# Patient Record
Sex: FEMALE | Race: WHITE | Employment: OTHER | URBAN - METROPOLITAN AREA
[De-identification: names, ages, dates, MRNs, and addresses within clinical notes are randomized per-mention and may not be internally consistent; named-entity substitution may affect disease eponyms.]

---

## 2017-03-06 RX ORDER — BUPROPION HYDROCHLORIDE 300 MG/1
TABLET ORAL
Qty: 30 TAB | Refills: 2 | Status: SHIPPED | OUTPATIENT
Start: 2017-03-06 | End: 2017-06-20 | Stop reason: SDUPTHER

## 2017-03-23 ENCOUNTER — TELEPHONE (OUTPATIENT)
Dept: INTERNAL MEDICINE CLINIC | Age: 67
End: 2017-03-23

## 2017-03-23 DIAGNOSIS — Z85.820 HISTORY OF MELANOMA: Primary | ICD-10-CM

## 2017-03-23 DIAGNOSIS — M54.2 NECK PAIN: ICD-10-CM

## 2017-03-24 NOTE — TELEPHONE ENCOUNTER
Returned call to patient. She is requesting referrals for dermatology for changing mole. Has increased in size above and below the surface of the skin. Has family history of of melanoma. 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 Related to cervical pain per patient. Would like a trigger point injection.

## 2017-04-26 ENCOUNTER — OFFICE VISIT (OUTPATIENT)
Dept: DERMATOLOGY | Facility: AMBULATORY SURGERY CENTER | Age: 67
End: 2017-04-26

## 2017-04-26 VITALS
SYSTOLIC BLOOD PRESSURE: 156 MMHG | HEART RATE: 79 BPM | DIASTOLIC BLOOD PRESSURE: 78 MMHG | HEIGHT: 60 IN | WEIGHT: 132 LBS | TEMPERATURE: 98.1 F | BODY MASS INDEX: 25.91 KG/M2 | OXYGEN SATURATION: 98 % | RESPIRATION RATE: 16 BRPM

## 2017-04-26 DIAGNOSIS — D48.5 NEOPLASM OF UNCERTAIN BEHAVIOR OF SKIN OF EAR: Primary | ICD-10-CM

## 2017-04-26 DIAGNOSIS — D48.5 NEOPLASM OF UNCERTAIN BEHAVIOR OF SKIN: ICD-10-CM

## 2017-04-26 DIAGNOSIS — D22.9 MULTIPLE BENIGN NEVI: ICD-10-CM

## 2017-04-26 DIAGNOSIS — L80 VITILIGO: ICD-10-CM

## 2017-04-26 DIAGNOSIS — Z80.8 FAMILY HISTORY OF MELANOMA: ICD-10-CM

## 2017-04-26 DIAGNOSIS — D18.01 CHERRY ANGIOMA: ICD-10-CM

## 2017-04-26 DIAGNOSIS — L82.1 SEBORRHEIC KERATOSES: ICD-10-CM

## 2017-04-26 NOTE — PROGRESS NOTES
Name: Kwesi Gillis       Age: 61 y.o. Date: 2017    Chief Complaint:   Chief Complaint   Patient presents with    Skin Exam       Subjective:    HPI  Ms. Kwesi Gillis is a 61 y.o. female who presents as a new patient to [place] [address] for a skin exam.  The patient was referred for this evaluation by Dr. Ernie Flores. The patient has had a skin exam in the past and the patient does have current complaints related to her skin. The patient reports a changing lesion on the right frontal scalp. She states this is been present for at least 2 years and had started as a tan flat freckle. She states over the last 2 years this is thickened and become more wrinkled. She states she had sent with the comb due to the raised nature. She also reports a lesion in the right ear that occasionally gets scratched and can bleed. She states this is of long duration. She is recently relocated to [facility] from [place] about 1.5 years ago. She is a retired nurse of critical care. Ms. Kwesi Gillis is feeling well and in her usual state of health today. The patient's pertinent skin history includes: No personal history of skin cancer but does have a history of having lesions removed she reports these were benign. She reports a family history of melanoma in her mother who is  from this. ROS: Constitutional: Negative. Dermatological : positive for - skin lesion changes      Social History     Social History    Marital status:      Spouse name: N/A    Number of children: N/A    Years of education: N/A     Occupational History    Not on file.      Social History Main Topics    Smoking status: Former Smoker     Quit date: 2010    Smokeless tobacco: Never Used    Alcohol use 8.4 oz/week     14 Shots of liquor per week    Drug use: No    Sexual activity: Yes     Partners: Male     Birth control/ protection: None     Other Topics Concern    Not on file Social History Narrative       Family History   Problem Relation Age of Onset    Cancer Mother        Past Medical History:   Diagnosis Date    Anemia     Anxiety     Breast cancer ([address] 2005    right lumpectomy    Chronic pain     neck pain    Cough     Depression     Hemorrhoid     Menopause 2005    Muscle pain     Neck problem     Pneumonia     sepis    S/P radiation therapy 2005    Sleep trouble     Thyroid disease     Urine troubles     unable to controll       Past Surgical History:   Procedure Laterality Date    HX BREAST LUMPECTOMY Right 2005    HX  SECTION      HX THYROIDECTOMY      IMPLANT BREAST SILICONE/EQ Bilateral 3335       Current Outpatient Prescriptions   Medication Sig Dispense Refill    LORazepam (ATIVAN) 1 mg tablet TAKE 1 TABLET BY MOUTH TWICE A DAY AS NEEDED FOR ANXIETY AND INSOMNIA 60 Tab 0    buPROPion XL (WELLBUTRIN XL) 300 mg XL tablet TAKE 1 TABLET BY MOUTH EVERY MORNING. 30 Tab 2    escitalopram oxalate (LEXAPRO) 20 mg tablet TAKE 1 TAB BY MOUTH DAILY. 90 Tab 3    levothyroxine (SYNTHROID) 112 mcg tablet Take 1 Tab by mouth daily. 90 Tab 3    melatonin tab tablet Take 5 mg by mouth nightly. No Known Allergies      Objective:    Visit Vitals    /78 (BP 1 Location: Left arm, BP Patient Position: Sitting)    Pulse 79    Temp 98.1 °F (36.7 °C) (Oral)    Resp 16    Ht 5' (1.524 m)    Wt 59.9 kg (132 lb)    SpO2 98%    BMI 25.78 kg/m2       Davey Pham is a 61 y.o. female who appears well and in no distress. She is awake, alert, and oriented. There is no preauricular, submandibular, or cervical lymphadenopathy. A skin examination was performed including her scalp, face (including eyelid), ears, neck, chest, back, abdomen, upper extremities (including digits/nails), lower extremities, breasts, buttocks; genital skin was not examined. She has lentigines on sun exposed areas.   There waxy macules and keratotic papules consistent with seborrheic keratoses. The lesion on the right frontal scalp appears to be a tan seborrheic keratosis that is raised and inflamed. This is 11 x 9 mm in size. There are scattered cherry angiomas. She has medium brown junctional nevi and a few intradermal nevi without concerning features for severe atypia. There is an intradermal nevus on the right anti-helix that is 5 x 5 mm in size. This is a lesion of concern in her ear. There is a traumatic scar on the left forehead. She has patches of hypopigmentation consistent with vitiligo. Assessment/Plan:  1. Normal nevi. The diagnosis of normal nevi was reviewed. I discussed sun protection, sunscreen use, the warning signs of skin cancer, the need for self-skin examinations, and the need for regular practitioner exams every 1 year. The patient should follow up sooner as needed if new, changing, or symptomatic skin lesions arise. 2. Neoplasm of Uncertain Behavior, right frontal scalp, favor SK. The differential diagnoses were discussed. A shave removal was advised to address this lesion. The procedure was reviewed and verbal and written consent were obtained. The risks of pain, bleeding, infection, recurrence and scar were discussed. I performed the procedure. The site was cleansed and anesthetized with 1% Lidocaine with Epinephrine 1:100,000. A shave removal was performed to remove the lesion in its clinical entirety. Drysol was used for hemostasis. The wound was bandaged and care reviewed. The specimen was sent to pathology. I will contact the patient with the results and any further treatment that may be necessary. 3. Neoplasm of Uncertain Behavior, right ear, favor benign nevus. The differential diagnoses were discussed. A shave removal was advised to address this lesion. The procedure was reviewed and verbal and written consent were obtained. The risks of pain, bleeding, infection, recurrence and scar were discussed.   I performed the procedure. The site was cleansed and anesthetized with 1% Lidocaine with Epinephrine 1:100,000. A shave removal was performed to remove the lesion in its clinical entirety. Drysol was used for hemostasis. The wound was bandaged and care reviewed. The specimen was sent to pathology. I will contact the patient with the results and any further treatment that may be necessary. 4. Family history of skin cancer. I discussed sun protection, sunscreen use, the warning signs of skin cancer, the need for self-skin examinations, and the need for regular practitioner exams every 1 year. The patient should follow up sooner as needed if new, changing, or symptomatic skin lesions arise. 5. Seborrheic keratoses. The diagnosis was reviewed and the patient was reassured that no treatment is needed for these benign lesions. 6. Cherry angiomas. The diagnosis was reviewed and the patient was reassured that no treatment is needed for these benign lesions. [facility]   OFFICE PROCEDURE PROGRESS NOTE   Chart reviewed for the following:   Jeremiah FLORENCE, have reviewed the History, Physical and updated the Allergic reactions for [employer]. TIME OUT performed immediately prior to start of procedure:   Lotus FLORENCE, have performed the following reviews on [employer]   prior to the start of the procedure:     * Patient was identified by name and date of birth   * Agreement on procedure being performed was verified   * Risks and Benefits explained to the patient   * Procedure site verified and marked as necessary   * Patient was positioned for comfort   * Consent was signed and verified     Time: 1150  Date of procedure: 4/26/2017  Procedure performed by: Nam Escobar.  Feng Moreno  Provider assisted by: lpn   Patient assisted by: self   How tolerated by patient: tolerated the procedure well with no complications   Comments: none

## 2017-04-26 NOTE — MR AVS SNAPSHOT
Visit Information Date & Time Provider Department Dept. Phone Encounter #  
 4/26/2017 11:30 AM JOSE ARMANDO Siddiqui 8057 [phone] [id number] Your Appointments 4/26/2017 11:30 AM  
Office Visit with JOSE ARMANDO Siddiqui [phone] [facility]) Appt Note: est pt; Skin Exam-Growth on scalp. [facility] [id number] [place] [id number] Upcoming Health Maintenance Date Due Hepatitis C Screening 1950 DTaP/Tdap/Td series (1 - Tdap) 9/24/1973 FOBT Q 1 YEAR AGE 50-75 9/24/2002 ZOSTER VACCINE AGE 60> 9/24/2012 INFLUENZA AGE 9 TO ADULT 8/1/2016 BREAST CANCER SCRN MAMMOGRAM 10/17/2018 PAP AKA CERVICAL CYTOLOGY 8/31/2019 Allergies as of 4/26/2017  Review Complete On: 4/26/2017 By: Breanna Avila LPN No Known Allergies Current Immunizations  Reviewed on 10/14/2015 Name Date Influenza Vaccine 10/7/2015 Not reviewed this visit Vitals BP Pulse Temp Resp Height(growth percentile) Weight(growth percentile) 156/78 (BP 1 Location: Left arm, BP Patient Position: Sitting) 79 98.1 °F (36.7 °C) (Oral) 16 5' (1.524 m) 132 lb (59.9 kg) SpO2 BMI OB Status Smoking Status 98% 25.78 kg/m2 Postmenopausal Former Smoker Vitals History BMI and BSA Data Body Mass Index Body Surface Area 25.78 kg/m 2 1.59 m 2 Preferred Pharmacy Pharmacy Name Phone [phone] Your Updated Medication List  
  
   
This list is accurate as of: 4/26/17 11:28 AM.  Always use your most recent med list.  
  
  
  
  
 buPROPion  mg XL tablet Commonly known as:  WELLBUTRIN XL  
TAKE 1 TABLET BY MOUTH EVERY MORNING. escitalopram oxalate 20 mg tablet Commonly known as:  Maudie Phoenix TAKE 1 TAB BY MOUTH DAILY. levothyroxine 112 mcg tablet Commonly known as:  SYNTHROID Take 1 Tab by mouth daily. LORazepam 1 mg tablet Commonly known as:  ATIVAN  
TAKE 1 TABLET BY MOUTH TWICE A DAY AS NEEDED FOR ANXIETY AND INSOMNIA  
  
 melatonin Tab tablet Take 5 mg by mouth nightly. Patient Instructions Self Skin Exam and Sunscreens Early detection and treatment is essential in the treatment of all forms of skin cancer. If caught early, all forms of skin cancer are curable. In addition to your regular visits, you should perform a monthly skin examination. Over time, you become familiar with what is normally found on your skin and can identify new or suspicious spots. One of the screening tools you can use to assess your skin is to follow the ABCDEs: 
 
A= Asymmetry (One half is unlike the other half) B= Border (An irregular, scalloped or poorly defined edge) C= Color (Is varied from one area to another, has shades of tan, brown/ black,       white, red or blue) D= Diameter (Spots larger than 6mm or a pencil eraser) E= Evolving (New spots or one that is changing in size, shape, or color) A follow- up interval will be customized based on your history of skin cancer or level of skin damage and risk factors. In any case, if you notice a suspicious or new spot, an appointment should be arranged between regular visits. Everyone should use sunscreen and sun-safe practices, which is especially important for those with a personal or family history of skin cancer. Suggestions for this include: 1. Use daily moisturizers containing SPF 30 or higher. 2. Wear long sleeve clothing with UPF ratings and a broad-brimmed hat. 3. Apply sunscreen with SPF 30 or higher to all sun exposed areas if you are going to be in the sun. A broad spectrum UVA/ UVB sunscreen is best.  Dont forget to REAPPLY every two hours or more often if swimming or sweating! 4. Avoid outside activities during peak sun hours, especially in the summer (10am- 2pm). 5. DO NOT use tanning beds. Using sunscreen and sun-safe practices can help reduce the likelihood of developing skin cancer or additional skin cancers in those previously diagnosed. Introducing [facility] & HEALTH SERVICES! Dear Keesha Constantino: 
Thank you for requesting a Aegis account. Our records indicate that you already have an active Aegis account. You can access your account anytime at [email or web address] Did you know that you can access your hospital and ER discharge instructions at any time in Aegis? You can also review all of your test results from your hospital stay or ER visit. Additional Information If you have questions, please visit the Frequently Asked Questions section of the Aegis website at [email or web address] Remember, Aegis is NOT to be used for urgent needs. For medical emergencies, dial 911. Now available from your iPhone and Android! Please provide this summary of care documentation to your next provider. Your primary care clinician is listed as Barrington Rossi. If you have any questions after today's visit, please call [phone].

## 2017-05-02 LAB
DX ICD CODE: NORMAL
DX ICD CODE: NORMAL
PATH REPORT.FINAL DX SPEC: NORMAL
PATH REPORT.GROSS SPEC: NORMAL
PATH REPORT.MICROSCOPIC SPEC OTHER STN: NORMAL
PATH REPORT.RELEVANT HX SPEC: NORMAL
PATH REPORT.SITE OF ORIGIN SPEC: NORMAL
PATHOLOGIST NAME: NORMAL
PAYMENT PROCEDURE: NORMAL

## 2017-05-02 NOTE — PROGRESS NOTES
Letter sent about benign path report and that no further tx is needed. I asked the patient to call with questions/ concerns.

## 2017-05-26 ENCOUNTER — OFFICE VISIT (OUTPATIENT)
Dept: INTERNAL MEDICINE CLINIC | Age: 67
End: 2017-05-26

## 2017-05-26 VITALS
OXYGEN SATURATION: 96 % | DIASTOLIC BLOOD PRESSURE: 77 MMHG | BODY MASS INDEX: 24.46 KG/M2 | TEMPERATURE: 98.2 F | RESPIRATION RATE: 16 BRPM | HEIGHT: 60 IN | WEIGHT: 124.6 LBS | SYSTOLIC BLOOD PRESSURE: 147 MMHG | HEART RATE: 82 BPM

## 2017-05-26 DIAGNOSIS — E03.9 ACQUIRED HYPOTHYROIDISM: ICD-10-CM

## 2017-05-26 DIAGNOSIS — F51.01 PRIMARY INSOMNIA: Primary | ICD-10-CM

## 2017-05-26 RX ORDER — TRAZODONE HYDROCHLORIDE 50 MG/1
50 TABLET ORAL
Qty: 30 TAB | Refills: 11 | Status: SHIPPED | OUTPATIENT
Start: 2017-05-26 | End: 2017-05-31 | Stop reason: SDUPTHER

## 2017-05-26 NOTE — PROGRESS NOTES
1. Have you been to the ER, urgent care clinic since your last visit? Hospitalized since your last visit?no    2. Have you seen or consulted any other health care providers outside of the [address] [place] [address] since your last visit? Include any pap smears or colon screening.  no

## 2017-05-26 NOTE — PATIENT INSTRUCTIONS

## 2017-05-26 NOTE — PROGRESS NOTES
SUBJECTIVE  Ms. Marlon Jones presents today acutely for     Chief Complaint   Patient presents with    Insomnia     pt states she only get 2 hours of sleep at night; pt has been getting headaches        Insomnia for the past 10 years. Dr. Hugo Cole has tried \"something which sometimes works and sometimes doesn't. \"   \"She didn't want to give me both a sleeping medicine and ativan, I had to choose so I chose ativan. But that's just not cutting it. \"  \"It's wearing me down, I can't take it anymore. \"     Current Outpatient Prescriptions on File Prior to Visit   Medication Sig Dispense Refill    LORazepam (ATIVAN) 1 mg tablet TAKE 1 TABLET TWICE A DAY AS NEEDED 60 Tab 0    buPROPion XL (WELLBUTRIN XL) 300 mg XL tablet TAKE 1 TABLET BY MOUTH EVERY MORNING. 30 Tab 2    levothyroxine (SYNTHROID) 112 mcg tablet Take 1 Tab by mouth daily. 90 Tab 3    melatonin tab tablet Take 5 mg by mouth nightly.  escitalopram oxalate (LEXAPRO) 20 mg tablet TAKE 1 TAB BY MOUTH DAILY. 90 Tab 3     No current facility-administered medications on file prior to visit. OBJECTIVE  Visit Vitals    /77 (BP 1 Location: Left arm, BP Patient Position: Sitting)    Pulse 82    Temp 98.2 °F (36.8 °C) (Oral)    Resp 16    Ht 5' (1.524 m)    Wt 124 lb 9.6 oz (56.5 kg)    SpO2 96%    BMI 24.33 kg/m2     Gen: Pleasant 61 y.o.  female in NAD.   HEENT: PERRLA. EOMI. OP moist and pink.  Neck: Supple.  No LAD.  HEART: RRR, No M/G/R.   LUNGS: CTAB No W/R.   ABDOMEN: S, NT, ND, BS+.   EXTREMITIES: Warm. No C/C/E. MUSCULOSKELETAL: Normal ROM, muscle strength 5/5 all groups. NEURO: Alert and oriented x 3.  Cranial nerves grossly intact.  No focal sensory or motor deficits noted. SKIN: Warm. Dry. No rashes or other lesions noted. ASSESSMENT / PLAN    ICD-10-CM ICD-9-CM    1.  Primary insomnia F51.01 307.42 traZODone (DESYREL) 50 mg tablet       I have reviewed with the patient details of the assessment and plan and all questions were answered. Relevant patient education was performed. Follow-up Disposition:  Return if symptoms worsen or fail to improve.

## 2017-05-26 NOTE — MR AVS SNAPSHOT
Visit Information Date & Time Provider Department Dept. Phone Encounter #  
 5/26/2017  2:45 PM Roxy Tsai, [address] [place] [address],[address] [phone] [id number] Follow-up Instructions Return in about 6 weeks (around 7/7/2017) for Insomnia; Dr. Mercy Longo. Upcoming Health Maintenance Date Due Hepatitis C Screening 1950 DTaP/Tdap/Td series (1 - Tdap) 9/24/1973 FOBT Q 1 YEAR AGE 50-75 9/24/2002 ZOSTER VACCINE AGE 60> 9/24/2012 INFLUENZA AGE 9 TO ADULT 8/1/2017 BREAST CANCER SCRN MAMMOGRAM 10/17/2018 PAP AKA CERVICAL CYTOLOGY 8/31/2019 Allergies as of 5/26/2017  Review Complete On: 5/26/2017 By: Roxy Tsai MD  
 No Known Allergies Current Immunizations  Reviewed on 10/14/2015 Name Date Influenza Vaccine 10/7/2015 Not reviewed this visit You Were Diagnosed With   
  
 Codes Comments Primary insomnia    -  Primary ICD-10-CM: F51.01 
ICD-9-CM: 307.42 Acquired hypothyroidism     ICD-10-CM: E03.9 ICD-9-CM: 644. 9 Vitals BP Pulse Temp Resp Height(growth percentile) Weight(growth percentile) 147/77 (BP 1 Location: Left arm, BP Patient Position: Sitting) 82 98.2 °F (36.8 °C) (Oral) 16 5' (1.524 m) 124 lb 9.6 oz (56.5 kg) SpO2 BMI OB Status Smoking Status 96% 24.33 kg/m2 Postmenopausal Former Smoker Vitals History BMI and BSA Data Body Mass Index Body Surface Area  
 24.33 kg/m 2 1.55 m 2 Preferred Pharmacy Pharmacy Name Phone [id number] [phone] Your Updated Medication List  
  
   
This list is accurate as of: 5/26/17  3:36 PM.  Always use your most recent med list.  
  
  
  
  
 buPROPion  mg XL tablet Commonly known as:  WELLBUTRIN XL  
TAKE 1 TABLET BY MOUTH EVERY MORNING. levothyroxine 112 mcg tablet Commonly known as:  SYNTHROID Take 1 Tab by mouth daily. LORazepam 1 mg tablet Commonly known as:  ATIVAN  
TAKE 1 TABLET TWICE A DAY AS NEEDED  
  
 melatonin Tab tablet Take 5 mg by mouth nightly. traZODone 50 mg tablet Commonly known as:  Arline Ricketts Take 1 Tab by mouth nightly. Prescriptions Sent to Pharmacy Refills  
 traZODone (DESYREL) 50 mg tablet 11 Sig: Take 1 Tab by mouth nightly. Class: Normal  
 [facility], [address] [place] [address] #: [phone] Route: Oral  
  
We Performed the Following METABOLIC PANEL, COMPREHENSIVE [72760 CPT(R)] T4, FREE [id number] CPT(R)] TSH 3RD GENERATION [77973 CPT(R)] Follow-up Instructions Return in about 6 weeks (around 7/7/2017) for Insomnia; Dr. Michael Wick. Patient Instructions Insomnia: Care Instructions Your Care Instructions Insomnia is the inability to sleep well. It is a common problem for most people at some time. Insomnia may make it hard for you to get to sleep, stay asleep, or sleep as long as you need to. This can make you tired and grouchy during the day. It can also make you forgetful, less effective at work, and unhappy. Insomnia can be caused by conditions such as depression or anxiety. Pain can also affect your ability to sleep. When these problems are solved, the insomnia usually clears up. But sometimes bad sleep habits can cause insomnia. If insomnia is affecting your work or your enjoyment of life, you can take steps to improve your sleep. Follow-up care is a key part of your treatment and safety. Be sure to make and go to all appointments, and call your doctor if you are having problems. It's also a good idea to know your test results and keep a list of the medicines you take. How can you care for yourself at home? What to avoid · Do not have drinks with caffeine, such as coffee or black tea, for 8 hours before bed. · Do not smoke or use other types of tobacco near bedtime.  Nicotine is a stimulant and can keep you awake. · Avoid drinking alcohol late in the evening, because it can cause you to wake in the middle of the night. · Do not eat a big meal close to bedtime. If you are hungry, eat a light snack. · Do not drink a lot of water close to bedtime, because the need to urinate may wake you up during the night. · Do not read or watch TV in bed. Use the bed only for sleeping and sexual activity. What to try · Go to bed at the same time every night, and wake up at the same time every morning. Do not take naps during the day. · Keep your bedroom quiet, dark, and cool. · Sleep on a comfortable pillow and mattress. · If watching the clock makes you anxious, turn it facing away from you so you cannot see the time. · If you worry when you lie down, start a worry book. Well before bedtime, write down your worries, and then set the book and your concerns aside. · Try meditation or other relaxation techniques before you go to bed. · If you cannot fall asleep, get up and go to another room until you feel sleepy. Do something relaxing. Repeat your bedtime routine before you go to bed again. · Make your house quiet and calm about an hour before bedtime. Turn down the lights, turn off the TV, log off the computer, and turn down the volume on music. This can help you relax after a busy day. When should you call for help? Watch closely for changes in your health, and be sure to contact your doctor if: 
· Your efforts to improve your sleep do not work. · Your insomnia gets worse. · You have been feeling down, depressed, or hopeless or have lost interest in things that you usually enjoy. Where can you learn more? Go to http://theresa-cathleen.info/. Enter P513 in the search box to learn more about \"Insomnia: Care Instructions. \" Current as of: July 26, 2016 Content Version: 11.2 © 2569-0553 [employer].  Care instructions adapted under license by [address] (which disclaims liability or warranty for this information). If you have questions about a medical condition or this instruction, always ask your healthcare professional. [address] any warranty or liability for your use of this information. Introducing [facility] & HEALTH SERVICES! Dear Keesha Constantino: 
Thank you for requesting a CampEasy account. Our records indicate that you already have an active CampEasy account. You can access your account anytime at [email or web address]. "[email or web address]/[email or web address] Studio Did you know that you can access your hospital and ER discharge instructions at any time in CampEasy? You can also review all of your test results from your hospital stay or ER visit. Additional Information If you have questions, please visit the Frequently Asked Questions section of the CampEasy website at [email or web address] Remember, CampEasy is NOT to be used for urgent needs. For medical emergencies, dial 911. Now available from your iPhone and Android! Please provide this summary of care documentation to your next provider. Your primary care clinician is listed as Barrington Rossi. If you have any questions after today's visit, please call [phone].

## 2017-05-27 LAB
ALBUMIN SERPL-MCNC: 4.2 G/DL (ref 3.6–4.8)
ALBUMIN/GLOB SERPL: 2.1 {RATIO} (ref 1.2–2.2)
ALP SERPL-CCNC: 41 IU/L (ref 39–117)
ALT SERPL-CCNC: 9 IU/L (ref 0–32)
AST SERPL-CCNC: 12 IU/L (ref 0–40)
BILIRUB SERPL-MCNC: 0.3 MG/DL (ref 0–1.2)
BUN SERPL-MCNC: 21 MG/DL (ref 8–27)
BUN/CREAT SERPL: 25 (ref 12–28)
CALCIUM SERPL-MCNC: 9.6 MG/DL (ref 8.7–10.3)
CHLORIDE SERPL-SCNC: 104 MMOL/L (ref 96–106)
CO2 SERPL-SCNC: 27 MMOL/L (ref 18–29)
CREAT SERPL-MCNC: 0.85 MG/DL (ref 0.57–1)
GLOBULIN SER CALC-MCNC: 2 G/DL (ref 1.5–4.5)
GLUCOSE SERPL-MCNC: 105 MG/DL (ref 65–99)
POTASSIUM SERPL-SCNC: 4.7 MMOL/L (ref 3.5–5.2)
PROT SERPL-MCNC: 6.2 G/DL (ref 6–8.5)
SODIUM SERPL-SCNC: 145 MMOL/L (ref 134–144)
T4 FREE SERPL-MCNC: 1.38 NG/DL (ref 0.82–1.77)
TSH SERPL DL<=0.005 MIU/L-ACNC: 0.32 UIU/ML (ref 0.45–4.5)

## 2017-05-30 ENCOUNTER — TELEPHONE (OUTPATIENT)
Dept: INTERNAL MEDICINE CLINIC | Age: 67
End: 2017-05-30

## 2017-05-30 DIAGNOSIS — F51.01 PRIMARY INSOMNIA: ICD-10-CM

## 2017-05-30 DIAGNOSIS — F41.9 ANXIETY AND DEPRESSION: Primary | ICD-10-CM

## 2017-05-30 DIAGNOSIS — F32.A ANXIETY AND DEPRESSION: Primary | ICD-10-CM

## 2017-05-30 RX ORDER — TRAZODONE HYDROCHLORIDE 50 MG/1
50 TABLET ORAL
Qty: 30 TAB | Refills: 11 | Status: CANCELLED | OUTPATIENT
Start: 2017-05-30

## 2017-05-30 NOTE — PROGRESS NOTES
She is a tiny bit hyperthyroid. If okay with Dr. Jesica Bain, would recommend we cut back the levothyroxine to a new dose of 100 mcg.

## 2017-05-31 RX ORDER — TRAZODONE HYDROCHLORIDE 50 MG/1
50 TABLET ORAL
Qty: 30 TAB | Refills: 11 | Status: SHIPPED | OUTPATIENT
Start: 2017-05-31 | End: 2017-09-28

## 2017-05-31 RX ORDER — LORAZEPAM 1 MG/1
1 TABLET ORAL
Qty: 60 TAB | Refills: 0 | OUTPATIENT
Start: 2017-05-31 | End: 2017-08-08 | Stop reason: SDUPTHER

## 2017-05-31 NOTE — TELEPHONE ENCOUNTER
Message was left for patient regarding lab and recommend dose change for Levothyroxine.  Will forwarde to Dr. Stephon Jj for review

## 2017-05-31 NOTE — TELEPHONE ENCOUNTER
From: Davina Ku  To: Tobias Cornejo MD  Sent: 5/30/2017 2:29 PM EDT  Subject: Medication Renewal Request    Original authorizing provider: MD Davina Goldman would like a refill of the following medications:  LORazepam (ATIVAN) 1 mg tablet Tobias Cornejo MD]    [facility] pharmacy: [id number] - [place] [address] [facility]    Comment:  Saw Dr. Merced López on 5/26 your schedule was filled. Insomnia over the past two weeks left me debilitated, averaging 2-3 hours/24. Wrote RX for Trazodone, start with 50mg, move up to max dose 200mg. It worked somewhat at the 150 mg level past 2 nights, said to tell you and write new RX for 150. Leaving tomorrow (Wednesday) for [employer], engagement party for my son. Return Sunday, leave on Fri.,10 day [facility]. Referral to sleep clinic when I return. Ativan gone, used for sleep.     Medication renewals requested in this message routed to other providers:  traZODone (DESYREL) 50 mg tablet Dawit Rodriguez MD]

## 2017-05-31 NOTE — TELEPHONE ENCOUNTER
----- Message from Rere Nolasco MD sent at 5/30/2017  4:28 PM EDT -----  She is a tiny bit hyperthyroid. If okay with Dr. Chan Feeling, would recommend we cut back the levothyroxine to a new dose of 100 mcg.

## 2017-05-31 NOTE — TELEPHONE ENCOUNTER
Addon[employer] message sent to patient per Dr. Demetrio Schaffer advising continue levothyroxine 112mcg and recheck in 3 months.

## 2017-06-05 ENCOUNTER — TELEPHONE (OUTPATIENT)
Dept: INTERNAL MEDICINE CLINIC | Age: 67
End: 2017-06-05

## 2017-06-05 NOTE — TELEPHONE ENCOUNTER
Patient states she needs a call back in reference to requesting a Dosage increase on her Trazadone. Patient states she needs this done prior to this Friday when she states she leaves to go out of the country. Please call to discuss.  Thank you

## 2017-06-21 RX ORDER — BUPROPION HYDROCHLORIDE 300 MG/1
TABLET ORAL
Qty: 30 TAB | Refills: 2 | Status: SHIPPED | OUTPATIENT
Start: 2017-06-21 | End: 2017-09-19 | Stop reason: SDUPTHER

## 2017-08-08 DIAGNOSIS — F41.9 ANXIETY AND DEPRESSION: ICD-10-CM

## 2017-08-08 DIAGNOSIS — F32.A ANXIETY AND DEPRESSION: ICD-10-CM

## 2017-08-08 RX ORDER — LORAZEPAM 1 MG/1
TABLET ORAL
Qty: 60 TAB | Refills: 0 | OUTPATIENT
Start: 2017-08-08 | End: 2017-10-10 | Stop reason: SDUPTHER

## 2017-08-09 NOTE — TELEPHONE ENCOUNTER
Approved Medications  LORazepam (ATIVAN) 1 mg tablet  TAKE 1 TABLET TWICE A DAY AS NEEDED       Disp: 60 Tab Refills: 0    Class: Phone In Start: 8/8/2017   For: Anxiety and depression  Approved by: Saran Colon MD  To pharmacy: Not to exceed 5 additional fills before 11/11/2017  Phoned in Ativan 1 mg to [facility] per  request. Marily Velasquez with Giovanni Jeter who gave Saulo Jones.

## 2017-08-24 DIAGNOSIS — E03.9 ACQUIRED HYPOTHYROIDISM: ICD-10-CM

## 2017-08-24 RX ORDER — LEVOTHYROXINE SODIUM 112 UG/1
TABLET ORAL
Qty: 90 TAB | Refills: 3 | Status: SHIPPED | OUTPATIENT
Start: 2017-08-24 | End: 2017-09-22 | Stop reason: SDUPTHER

## 2017-09-19 RX ORDER — BUPROPION HYDROCHLORIDE 300 MG/1
TABLET ORAL
Qty: 30 TAB | Refills: 2 | Status: SHIPPED | OUTPATIENT
Start: 2017-09-19 | End: 2017-10-06 | Stop reason: SDUPTHER

## 2017-09-22 DIAGNOSIS — E03.9 ACQUIRED HYPOTHYROIDISM: ICD-10-CM

## 2017-09-22 DIAGNOSIS — F41.9 ANXIETY AND DEPRESSION: ICD-10-CM

## 2017-09-22 DIAGNOSIS — F32.A ANXIETY AND DEPRESSION: ICD-10-CM

## 2017-09-22 RX ORDER — LORAZEPAM 1 MG/1
TABLET ORAL
Qty: 60 TAB | Refills: 0 | OUTPATIENT
Start: 2017-09-22

## 2017-09-22 RX ORDER — LEVOTHYROXINE SODIUM 112 UG/1
TABLET ORAL
Qty: 90 TAB | Refills: 3 | Status: SHIPPED | OUTPATIENT
Start: 2017-09-22 | End: 2018-05-09 | Stop reason: DRUGHIGH

## 2017-09-22 RX ORDER — TRAZODONE HYDROCHLORIDE 150 MG/1
150 TABLET ORAL
Qty: 90 TAB | Refills: 3 | Status: SHIPPED | OUTPATIENT
Start: 2017-09-22 | End: 2017-09-28

## 2017-09-22 NOTE — TELEPHONE ENCOUNTER
Advise patient that lorazepam is needs to be filled locally, which pharmacy? Others sent to [facility] mail order.

## 2017-09-28 ENCOUNTER — HOSPITAL ENCOUNTER (OUTPATIENT)
Dept: LAB | Age: 67
Discharge: HOME | End: 2017-09-28
Payer: MEDICARE

## 2017-09-28 ENCOUNTER — OFFICE VISIT (OUTPATIENT)
Dept: INTERNAL MEDICINE CLINIC | Age: 67
End: 2017-09-28

## 2017-09-28 VITALS
HEIGHT: 60 IN | DIASTOLIC BLOOD PRESSURE: 69 MMHG | OXYGEN SATURATION: 98 % | WEIGHT: 121.8 LBS | TEMPERATURE: 98 F | RESPIRATION RATE: 16 BRPM | HEART RATE: 82 BPM | BODY MASS INDEX: 23.91 KG/M2 | SYSTOLIC BLOOD PRESSURE: 129 MMHG

## 2017-09-28 DIAGNOSIS — R05.9 COUGH: ICD-10-CM

## 2017-09-28 DIAGNOSIS — R19.4 CHANGE IN BOWEL HABITS: ICD-10-CM

## 2017-09-28 DIAGNOSIS — R31.9 HEMATURIA: ICD-10-CM

## 2017-09-28 DIAGNOSIS — Z12.31 ENCOUNTER FOR SCREENING MAMMOGRAM FOR BREAST CANCER: ICD-10-CM

## 2017-09-28 DIAGNOSIS — E03.9 ACQUIRED HYPOTHYROIDISM: Primary | ICD-10-CM

## 2017-09-28 DIAGNOSIS — F41.9 ANXIETY AND DEPRESSION: ICD-10-CM

## 2017-09-28 DIAGNOSIS — F32.A ANXIETY AND DEPRESSION: ICD-10-CM

## 2017-09-28 DIAGNOSIS — Z86.010 HISTORY OF COLON POLYPS: ICD-10-CM

## 2017-09-28 DIAGNOSIS — F51.01 PRIMARY INSOMNIA: ICD-10-CM

## 2017-09-28 PROCEDURE — 81001 URINALYSIS AUTO W/SCOPE: CPT

## 2017-09-28 PROCEDURE — 87086 URINE CULTURE/COLONY COUNT: CPT

## 2017-09-28 RX ORDER — TRAZODONE HYDROCHLORIDE 150 MG/1
TABLET ORAL
Qty: 30 TAB | Refills: 3 | Status: SHIPPED | OUTPATIENT
Start: 2017-09-28 | End: 2019-07-01 | Stop reason: SDUPTHER

## 2017-09-28 NOTE — MR AVS SNAPSHOT
Visit Information Date & Time Provider Department Dept. Phone Encounter #  
 9/28/2017 11:30 AM Raya Richmond, [address] [id number] Follow-up Instructions Return for follow up pending tests and as scheduled. .  
  
Your Appointments 12/15/2017  9:30 AM  
PHYSICAL PRE OP with Raya Richmond MD  
[facility] [address]) Appt Note: CPE//Yearly Physical w/fasting labs [address] [id number]  
[address] [facility] [address] Upcoming Health Maintenance Date Due Hepatitis C Screening 1950 DTaP/Tdap/Td series (1 - Tdap) 9/24/1973 FOBT Q 1 YEAR AGE 50-75 9/24/2002 ZOSTER VACCINE AGE 60> 7/24/2012 INFLUENZA AGE 9 TO ADULT 8/1/2017 GLAUCOMA SCREENING Q2Y 9/24/2017 Pneumococcal 65+ Low/Medium Risk (1 of 2 - PCV13) 9/24/2017 MEDICARE YEARLY EXAM 9/24/2017 BREAST CANCER SCRN MAMMOGRAM 10/17/2018 PAP AKA CERVICAL CYTOLOGY 8/31/2019 Allergies as of 9/28/2017  Review Complete On: 9/28/2017 By: Harrison Calderon LPN No Known Allergies Current Immunizations  Reviewed on 10/14/2015 Name Date Influenza Vaccine 10/7/2015 Not reviewed this visit You Were Diagnosed With   
  
 Codes Comments Acquired hypothyroidism    -  Primary ICD-10-CM: E03.9 ICD-9-CM: 244.9 Primary insomnia     ICD-10-CM: F51.01 
ICD-9-CM: 307.42 Anxiety and depression     ICD-10-CM: F41.9, F32.9 ICD-9-CM: 300.00, 311 Hematuria     ICD-10-CM: R31.9 ICD-9-CM: 599.70 Change in bowel habits     ICD-10-CM: R19.4 ICD-9-CM: 787.99 History of colon polyps     ICD-10-CM: Z86.010 
ICD-9-CM: V12.72 Encounter for screening mammogram for breast cancer     ICD-10-CM: Z12.31 
ICD-9-CM: V76.12 Vitals BP Pulse Temp Resp Height(growth percentile) Weight(growth percentile) 129/69 (BP 1 Location: Left arm, BP Patient Position: Sitting) 82 98 °F (36.7 °C) (Oral) 16 5' (1.524 m) 121 lb 12.8 oz (55.2 kg) SpO2 BMI OB Status Smoking Status 98% 23.79 kg/m2 Postmenopausal Former Smoker Vitals History BMI and BSA Data Body Mass Index Body Surface Area  
 23.79 kg/m 2 1.53 m 2 Preferred Pharmacy Pharmacy Name Phone Dolores Tai [address] [place] [address] 2[address] [facility] [address] [place] [address] [phone] Your Updated Medication List  
  
   
This list is accurate as of: 9/28/17 12:36 PM.  Always use your most recent med list.  
  
  
  
  
 buPROPion  mg XL tablet Commonly known as:  WELLBUTRIN XL  
TAKE 1 TABLET BY MOUTH EVERY MORNING. levothyroxine 112 mcg tablet Commonly known as:  SYNTHROID  
TAKE 1 TAB BY MOUTH DAILY. LORazepam 1 mg tablet Commonly known as:  ATIVAN  
TAKE 1 TABLET TWICE A DAY AS NEEDED  
  
 melatonin Tab tablet Take 5 mg by mouth nightly. traZODone 150 mg tablet Commonly known as:  Louie Buck Take 1 Tab by mouth nightly. We Performed the Following CULTURE, URINE [id number] CPT(R)] REFERRAL TO GASTROENTEROLOGY [IRA25 Custom] Comments:  
 Colon screening, history of polyp and change in bowel habits. THYROID PANEL W/TSH [78468 CPT(R)] URINALYSIS W/MICROSCOPIC [19172 CPT(R)] Follow-up Instructions Return for follow up pending tests and as scheduled. Dony Dixon To-Do List   
 09/28/2017 Imaging:  [facility] MAMMO BI SCREENING INCL CAD Referral Information Referral ID Referred By Referred To  
  
 [id number] MARKY, [address] [place] [address]   
   [facility] [address] [place], [address] [facility] Visits Status Start Date End Date 1 New Request 9/28/17 9/28/18 If your referral has a status of pending review or denied, additional information will be sent to support the outcome of this decision. Patient Instructions Cough: Try claritin, allegra or zyrtec to treat possible postnasal drip. If not response, try to treat reflux with omeprazole 20mg once a day. Introducing [facility] SERVICES! Dear Juanpablo Parrish: 
Thank you for requesting a [employer] account. Our records indicate that you already have an active [employer] account. You can access your account anytime at [email or web address] Did you know that you can access your hospital and ER discharge instructions at any time in [employer]? You can also review all of your test results from your hospital stay or ER visit. Additional Information If you have questions, please visit the Frequently Asked Questions section of the [employer] website at [email or web address] Remember, [employer] is NOT to be used for urgent needs. For medical emergencies, dial 911. Now available from your iPhone and Android! Please provide this summary of care documentation to your next provider. Your primary care clinician is listed as Stanislaw Ayala. If you have any questions after today's visit, please call [phone].

## 2017-09-28 NOTE — PATIENT INSTRUCTIONS
Cough: Try claritin, allegra or zyrtec to treat possible postnasal drip. If not response, try to treat reflux with omeprazole 20mg once a day.

## 2017-09-28 NOTE — PROGRESS NOTES
Reviewed record in preparation for visit and have obtained necessary documentation. Identified pt with two pt identifiers(name and ). Chief Complaint   Patient presents with    Cough     x 2 months    Diarrhea     Dark urine and diarrhea x 2 month       Vitals:    17 1151   BP: 129/69   Pulse: 82   Resp: 16   Temp: 98 °F (36.7 °C)   TempSrc: Oral   SpO2: 98%   Weight: 121 lb 12.8 oz (55.2 kg)   Height: 5' (1.524 m)   PainSc:   0 - No pain       Coordination of Care Questionnaire:  :     1) Have you been to an emergency room, urgent care clinic since your last visit? no   Hospitalized since your last visit? no             2) Have you seen or consulted any other health care providers outside of [address] [place] [address] since your last visit? no  (Include any pap smears or colon screenings in this section.)    3) In the event something were to happen to you and you were unable to speak on your behalf, do you have an Advance Directive/ Living Will in place stating your wishes? NO    Do you have an Advance Directive on file? no    4) Are you interested in receiving information on Advance Directives?  NO

## 2017-09-28 NOTE — PROGRESS NOTES
Tayo Gunn is a 74 y.o. female who presents with concerns of dark urine. She states that the color of urine will vary. Reports the episodes of dark urine started 3 weeks ago. Prior urologist evaluation about 10 years ago for blood in urine. Today, no dysuria, no frequency, no urgency. No abdominal pain. No DUB. No BRBPR or melena. Reports BM vary. Usually very soft stool in morning for 2 1/2 months. Reports that she is feeling more anxiety lately. Colonoscopy age 63. No family history of colon cancer. She had one polyp. No diet changes. Follows low fat diet. Taking wellbutrin XL 300mg once a day, still depressed. Taking lorazepam at bedtime at times. Back on trazodone 150mg once a day. Wants to see psych. Was on lexapro 20mg in combination with wellbutrin, not effective. Prior effexor, prior prozac. Reports an ongoing cough, mostly at night. Denies PND. The cough is intermittent. Some runny nose. Due for mammogram.          Past Medical History:   Diagnosis Date    Anemia     Anxiety     Breast cancer ([facility] [address] 2005    right lumpectomy    Chronic pain     neck pain    Cough     Depression     Hemorrhoid     Menopause 2005    Muscle pain     Neck problem     Pneumonia     sepis    S/P radiation therapy 2005    Sleep trouble     Thyroid disease     Urine troubles     unable to controll       Family History   Problem Relation Age of Onset    Cancer Mother        Social History     Social History    Marital status:      Spouse name: N/A    Number of children: N/A    Years of education: N/A     Occupational History    Not on file.      Social History Main Topics    Smoking status: Former Smoker     Quit date: 1/1/2010    Smokeless tobacco: Never Used    Alcohol use 8.4 oz/week     14 Shots of liquor per week    Drug use: No    Sexual activity: Yes     Partners: Male     Birth control/ protection: None     Other Topics Concern    Not on file     Social History Narrative       Current Outpatient Prescriptions on File Prior to Visit   Medication Sig Dispense Refill    levothyroxine (SYNTHROID) 112 mcg tablet TAKE 1 TAB BY MOUTH DAILY. 90 Tab 3    buPROPion XL (WELLBUTRIN XL) 300 mg XL tablet TAKE 1 TABLET BY MOUTH EVERY MORNING. 30 Tab 2    LORazepam (ATIVAN) 1 mg tablet TAKE 1 TABLET TWICE A DAY AS NEEDED 60 Tab 0    melatonin tab tablet Take 5 mg by mouth nightly. No current facility-administered medications on file prior to visit. Review of Systems  Pertinent items are noted in HPI. Objective:     Visit Vitals    /69 (BP 1 Location: Left arm, BP Patient Position: Sitting)    Pulse 82    Temp 98 °F (36.7 °C) (Oral)    Resp 16    Ht 5' (1.524 m)    Wt 121 lb 12.8 oz (55.2 kg)    SpO2 98%    BMI 23.79 kg/m2     Gen: well appearing female  HEENT:   PERRL,normal conjunctiva. External ear and canals normal, TMs no opacification or erythema,  OP no erythema, no exudates, MMM  Neck:  Supple. Thyroid normal size, nontender, without nodules. No masses or LAD  Resp:  No wheezing, no rhonchi, no rales. CV:  RRR, normal S1S2, no murmur. GI: soft, nontender, without masses. No hepatosplenomegaly. Extrem:  +2 pulses, no edema, warm distally      Assessment/Plan:     1. Acquired hypothyroidism- Recommend taking thyroid medication daily on empty stomach and regular follow up. -THYROID PANEL W/TSH    2. Primary insomnia- Discussed sleep hygiene. Avoid caffeine. Use relaxation techniques. Medication as directed. 3. Anxiety and depression- continue wellbutrin. 4. Hematuria    - URINALYSIS W/MICROSCOPIC  - CULTURE, URINE    5. Change in bowel habits    - REFERRAL TO GASTROENTEROLOGY    6. History of colon polyps    - REFERRAL TO GASTROENTEROLOGY    7. Encounter for screening mammogram for breast cancer    - [facility] MAMMO BI SCREENING INCL CAD; Future    8.  Cough- recommend antihistamine to treat postnasal drip, if not effective, recommend PPI.     Follow-up Disposition:  Return for follow up pending tests and as scheduled.  Gilbert Goss MD

## 2017-09-29 LAB
APPEARANCE UR: ABNORMAL
BACTERIA #/AREA URNS HPF: ABNORMAL /[HPF]
BACTERIA UR CULT: NORMAL
BILIRUB UR QL STRIP: NEGATIVE
CASTS URNS MICRO: ABNORMAL
CASTS URNS QL MICRO: PRESENT /LPF
COLOR UR: YELLOW
EPI CELLS #/AREA URNS HPF: ABNORMAL /HPF
GLUCOSE UR QL: NEGATIVE
HGB UR QL STRIP: ABNORMAL
KETONES UR QL STRIP: NEGATIVE
LEUKOCYTE ESTERASE UR QL STRIP: NEGATIVE
MICRO URNS: ABNORMAL
MUCOUS THREADS URNS QL MICRO: PRESENT
NITRITE UR QL STRIP: NEGATIVE
PH UR STRIP: 6.5 [PH] (ref 5–7.5)
PROT UR QL STRIP: ABNORMAL
RBC #/AREA URNS HPF: ABNORMAL /HPF
SP GR UR: 1.02 (ref 1–1.03)
UROBILINOGEN UR STRIP-MCNC: 0.2 MG/DL (ref 0.2–1)
WBC #/AREA URNS HPF: ABNORMAL /HPF

## 2017-10-01 NOTE — PROGRESS NOTES
Urine shows red blood cells. No infection, culture negative. Needs to see urologist to evaluate blood in urine. Refer to Dr. Scott Hansen, referral in system.

## 2017-10-04 NOTE — PROGRESS NOTES
Called and spoke to patient , Let patient know that Urine shows red blood cells. No infection, culture negative. Needs to see urologist to evaluate blood in urine. Refer to Dr. Minerva Kehr, referral in system. Gave phone number to patient to call and make appt.

## 2017-10-06 RX ORDER — BUPROPION HYDROCHLORIDE 300 MG/1
TABLET ORAL
Qty: 30 TAB | Refills: 2 | Status: SHIPPED | OUTPATIENT
Start: 2017-10-06 | End: 2017-10-23 | Stop reason: SDUPTHER

## 2017-10-10 DIAGNOSIS — F41.9 ANXIETY AND DEPRESSION: ICD-10-CM

## 2017-10-10 DIAGNOSIS — F32.A ANXIETY AND DEPRESSION: ICD-10-CM

## 2017-10-11 RX ORDER — LORAZEPAM 1 MG/1
TABLET ORAL
Qty: 60 TAB | Refills: 0 | OUTPATIENT
Start: 2017-10-11 | End: 2017-10-13 | Stop reason: SDUPTHER

## 2017-10-13 RX ORDER — LORAZEPAM 1 MG/1
TABLET ORAL
Qty: 60 TAB | Refills: 0 | OUTPATIENT
Start: 2017-10-13 | End: 2017-12-29 | Stop reason: SDUPTHER

## 2017-10-16 NOTE — TELEPHONE ENCOUNTER
Approved Medications  LORazepam (ATIVAN) 1 mg tablet  TAKE 1 TABLET TWICE A DAY AS NEEDED       Disp: 60 Tab Refills: 0    Class: Phone In Start: 10/11/2017 - 10/13/2017   For: Anxiety and depression  Approved by: Conor Escobar MD  To pharmacy: Not to exceed 5 additional fills before 11/11/2017  Phoned in Ativan to [facility] pharmacy per  request. [facility]

## 2017-10-18 ENCOUNTER — HOSPITAL ENCOUNTER (OUTPATIENT)
Dept: MAMMOGRAPHY | Age: 67
Discharge: HOME | End: 2017-10-18
Payer: MEDICARE

## 2017-10-18 DIAGNOSIS — Z12.31 ENCOUNTER FOR SCREENING MAMMOGRAM FOR BREAST CANCER: ICD-10-CM

## 2017-10-18 PROCEDURE — G0202 SCR MAMMO BI INCL CAD: HCPCS

## 2017-10-23 RX ORDER — BUPROPION HYDROCHLORIDE 300 MG/1
TABLET ORAL
Qty: 30 TAB | Refills: 2 | Status: SHIPPED | OUTPATIENT
Start: 2017-10-23 | End: 2018-02-19 | Stop reason: SDUPTHER

## 2017-11-08 ENCOUNTER — HOSPITAL ENCOUNTER (OUTPATIENT)
Dept: CT IMAGING | Age: 67
Discharge: HOME | End: 2017-11-08
Attending: UROLOGY
Payer: MEDICARE

## 2017-11-08 DIAGNOSIS — R31.29 MICROHEMATURIA: ICD-10-CM

## 2017-11-08 LAB — CREAT BLD-MCNC: 0.9 MG/DL (ref 0.6–1.3)

## 2017-11-08 PROCEDURE — 74011250636 HC RX REV CODE- 250/636: Performed by: UROLOGY

## 2017-11-08 PROCEDURE — 74011636320 HC RX REV CODE- 636/320: Performed by: UROLOGY

## 2017-11-08 PROCEDURE — 74178 CT ABD&PLV WO CNTR FLWD CNTR: CPT

## 2017-11-08 PROCEDURE — 82565 ASSAY OF CREATININE: CPT

## 2017-11-08 RX ORDER — SODIUM CHLORIDE 9 MG/ML
50 INJECTION, SOLUTION INTRAVENOUS
Status: COMPLETED | OUTPATIENT
Start: 2017-11-08 | End: 2017-11-08

## 2017-11-08 RX ORDER — SODIUM CHLORIDE 0.9 % (FLUSH) 0.9 %
10 SYRINGE (ML) INJECTION
Status: COMPLETED | OUTPATIENT
Start: 2017-11-08 | End: 2017-11-08

## 2017-11-08 RX ADMIN — SODIUM CHLORIDE 50 ML/HR: 900 INJECTION, SOLUTION INTRAVENOUS at 10:02

## 2017-11-08 RX ADMIN — IOPAMIDOL 100 ML: 612 INJECTION, SOLUTION INTRAVENOUS at 10:02

## 2017-11-08 RX ADMIN — Medication 10 ML: at 10:02

## 2017-12-14 ENCOUNTER — TELEPHONE (OUTPATIENT)
Dept: INTERNAL MEDICINE CLINIC | Age: 67
End: 2017-12-14

## 2017-12-14 NOTE — TELEPHONE ENCOUNTER
Patient is requesting a call back because she needs to have her physical done this year. We are having to r/s her appt due to Dr Basilio Fall being out of the office. Thanks.  Ariaen Parnell

## 2017-12-18 NOTE — TELEPHONE ENCOUNTER
Patient states she needs a call back in reference to getting an appt to re-schedule her CPE appt that was Provider Cancelled. Patient states she can't wait another couple of month to get this appt. Please call to discuss.  Thank you

## 2017-12-20 ENCOUNTER — TELEPHONE (OUTPATIENT)
Dept: INTERNAL MEDICINE CLINIC | Age: 67
End: 2017-12-20

## 2017-12-20 NOTE — TELEPHONE ENCOUNTER
Pt returning call.  Needs appt, hers was cancelled last week and she cannot wait till March.  She is in tears.  Best number [phone].        Message received & copied from [facility]

## 2017-12-20 NOTE — TELEPHONE ENCOUNTER
Two patient identifiers confirmed. Name and    Spoke with Wilmar Gaona  Pt offered and accepted appointment 2018 at 9:00am with Dr. Evelyn Lorenzana. Pt verbalized understanding of information discussed w/ no further questions at this time.

## 2017-12-29 DIAGNOSIS — F32.A ANXIETY AND DEPRESSION: ICD-10-CM

## 2017-12-29 DIAGNOSIS — F41.9 ANXIETY AND DEPRESSION: ICD-10-CM

## 2017-12-29 RX ORDER — LORAZEPAM 1 MG/1
TABLET ORAL
Qty: 60 TAB | Refills: 0 | OUTPATIENT
Start: 2017-12-29 | End: 2018-02-02 | Stop reason: SDUPTHER

## 2017-12-29 NOTE — TELEPHONE ENCOUNTER
Approved Medications  LORazepam (ATIVAN) 1 mg tablet  TAKE 1 TABLET TWICE A DAY AS NEEDED       Disp: 60 Tab Refills: 0    Class: Phone In Start: 12/29/2017   For: Anxiety and depression  Approved by: Ayden Mazariegos MD  To pharmacy: Not to exceed 5 additional fills before 04/14/2018  Phoned in Ativan to [address] per  request. [facility]

## 2018-01-31 ENCOUNTER — OFFICE VISIT (OUTPATIENT)
Dept: INTERNAL MEDICINE CLINIC | Age: 68
End: 2018-01-31

## 2018-01-31 VITALS
WEIGHT: 129 LBS | TEMPERATURE: 98.7 F | OXYGEN SATURATION: 96 % | HEART RATE: 76 BPM | DIASTOLIC BLOOD PRESSURE: 56 MMHG | SYSTOLIC BLOOD PRESSURE: 126 MMHG | BODY MASS INDEX: 25.32 KG/M2 | RESPIRATION RATE: 18 BRPM | HEIGHT: 60 IN

## 2018-01-31 DIAGNOSIS — R05.9 COUGH: Primary | ICD-10-CM

## 2018-01-31 DIAGNOSIS — J01.00 SUBACUTE MAXILLARY SINUSITIS: ICD-10-CM

## 2018-01-31 RX ORDER — LEVOFLOXACIN 500 MG/1
500 TABLET, FILM COATED ORAL DAILY
Qty: 10 TAB | Refills: 0 | Status: SHIPPED | OUTPATIENT
Start: 2018-01-31 | End: 2018-05-01

## 2018-01-31 RX ORDER — PNEUMOCOCCAL 13-VALENT CONJUGATE VACCINE 2.2; 2.2; 2.2; 2.2; 2.2; 4.4; 2.2; 2.2; 2.2; 2.2; 2.2; 2.2; 2.2 UG/.5ML; UG/.5ML; UG/.5ML; UG/.5ML; UG/.5ML; UG/.5ML; UG/.5ML; UG/.5ML; UG/.5ML; UG/.5ML; UG/.5ML; UG/.5ML; UG/.5ML
INJECTION, SUSPENSION INTRAMUSCULAR
Refills: 0 | COMMUNITY
Start: 2017-11-16 | End: 2019-10-22 | Stop reason: ALTCHOICE

## 2018-01-31 RX ORDER — CHOLECALCIFEROL (VITAMIN D3) 125 MCG
220 CAPSULE ORAL
COMMUNITY
End: 2021-02-03 | Stop reason: ALTCHOICE

## 2018-01-31 NOTE — PROGRESS NOTES
Chief Complaint   Patient presents with    Chest Congestion     coughing up yellowish mucus x 1 month     Visit Vitals    /56 (BP 1 Location: Left arm, BP Patient Position: Sitting)    Pulse 76    Temp 98.7 °F (37.1 °C) (Oral)    Resp 18    Ht 5' (1.524 m)    Wt 129 lb (58.5 kg)    SpO2 96%    BMI 25.19 kg/m2     Reviewed record In preparation for visit and have obtained necessary documentation    1. Have you been to the ER, urgent care clinic since your last visit? Hospitalized since your last visit? NO    2. Have you seen or consulted any other health care providers outside of the [address] [place] [address] since your last visit? Include any pap smears or colon screening. NO    Patient does not have advance directive on file.

## 2018-01-31 NOTE — MR AVS SNAPSHOT
[address] 
[phone] Patient: Otonile Jennings MRN: [id number] FLT:[id number]/46/[id number] Visit Information Date & Time Provider Department Dept. Phone Encounter #  
 1/31/2018  3:15 PM Fuentes Novak, [address] [place] [address],[address] [phone] [id number] Follow-up Instructions Return if symptoms worsen or fail to improve. Upcoming Health Maintenance Date Due Hepatitis C Screening 1950 DTaP/Tdap/Td series (1 - Tdap) 9/24/1973 FOBT Q 1 YEAR AGE 50-75 9/24/2002 ZOSTER VACCINE AGE 60> 7/24/2012 Influenza Age 5 to Adult 8/1/2017 GLAUCOMA SCREENING Q2Y 9/24/2017 Pneumococcal 65+ Low/Medium Risk (1 of 2 - PCV13) 9/24/2017 MEDICARE YEARLY EXAM 9/24/2017 BREAST CANCER SCRN MAMMOGRAM 10/18/2019 Allergies as of 1/31/2018  Review Complete On: 1/31/2018 By: Fuentes Novak MD  
 No Known Allergies Current Immunizations  Reviewed on 1/31/2018 Name Date Influenza Vaccine 10/7/2015 Pneumococcal Conjugate (PCV-13) 10/1/2017 Reviewed by Fuentes Novak MD on 1/31/2018 at  3:55 PM  
You Were Diagnosed With   
  
 Codes Comments Cough    -  Primary ICD-10-CM: Z78 ICD-9-CM: 786.2 Subacute maxillary sinusitis     ICD-10-CM: J01.00 ICD-9-CM: 461.0 Vitals BP Pulse Temp Resp Height(growth percentile) Weight(growth percentile) 126/56 (BP 1 Location: Left arm, BP Patient Position: Sitting) 76 98.7 °F (37.1 °C) (Oral) 18 5' (1.524 m) 129 lb (58.5 kg) SpO2 BMI OB Status Smoking Status 96% 25.19 kg/m2 Postmenopausal Former Smoker BMI and BSA Data Body Mass Index Body Surface Area  
 25.19 kg/m 2 1.57 m 2 Preferred Pharmacy Pharmacy Name Phone [id number] [phone] Your Updated Medication List  
  
   
 This list is accurate as of: 1/31/18  4:00 PM.  Always use your most recent med list.  
  
  
  
  
 ALEVE 220 mg Cap Generic drug:  naproxen sodium Take  by mouth. buPROPion  mg XL tablet Commonly known as:  WELLBUTRIN XL  
TAKE 1 TABLET BY MOUTH EVERY MORNING. FLUZONE HIGH-DOSE 2017-18 (PF) Syrg injection Generic drug:  influenza vaccine 2017-18 (65 yrs+)(PF)  
FLU  
  
 levoFLOXacin 500 mg tablet Commonly known as:  Getachew Crater Take 1 Tab by mouth daily. levothyroxine 112 mcg tablet Commonly known as:  SYNTHROID  
TAKE 1 TAB BY MOUTH DAILY. LORazepam 1 mg tablet Commonly known as:  ATIVAN  
TAKE 1 TABLET TWICE A DAY AS NEEDED  
  
 melatonin Tab tablet Take 5 mg by mouth nightly. PREVNAR 13 (PF) 0.5 mL Syrg injection Generic drug:  pneumococcal 13 fco conj dip TO BE ADMINISTERED BY THE PHARMACIST  
  
 traZODone 150 mg tablet Commonly known as:  DESYREL  
TAKE 1 TABLET BY MOUTH NIGHTLY. Prescriptions Sent to Pharmacy Refills  
 levoFLOXacin (LEVAQUIN) 500 mg tablet 0 Sig: Take 1 Tab by mouth daily. Class: Normal  
 [facility] [address] Ph #: [phone] Route: Oral  
  
Follow-up Instructions Return if symptoms worsen or fail to improve. Introducing [facility] HE[facility] SERVICES! Dear Clary Speak: 
Thank you for requesting a [employer] account. Our records indicate that you already have an active [employer] account. You can access your account anytime at [email or web address] Did you know that you can access your hospital and ER discharge instructions at any time in [employer]? You can also review all of your test results from your hospital stay or ER visit. Additional Information If you have questions, please visit the Frequently Asked Questions section of the [employer] website at [email or web address] Remember, MyChart is NOT to be used for urgent needs. For medical emergencies, dial 911. Now available from your iPhone and Android! Please provide this summary of care documentation to your next provider. Your primary care clinician is listed as Jeremy Mcmanus. If you have any questions after today's visit, please call [phone].

## 2018-01-31 NOTE — PROGRESS NOTES
Ashlie Deng is a 74 y.o. female who complains of URI symptoms for one month. Started like a \"typical cold\". Still has a cough, yellow mucous, fatigue. Feeling chest congestion. No fever. No sinus or ear pain. Prior pneumonia. Prior prenvar 13. Past Medical History:   Diagnosis Date    Anemia     Anxiety     Breast cancer ([address] 2005    right lumpectomy    Chronic pain     neck pain    Cough     Depression     Hemorrhoid     Menopause 2005    Muscle pain     Neck problem     Pneumonia     sepis    S/P radiation therapy 2005    Sleep trouble     Thyroid disease     Urine troubles     unable to controll           Review of Systems  Pertinent items are noted in HPI. Objective:     Visit Vitals    /56 (BP 1 Location: Left arm, BP Patient Position: Sitting)    Pulse 76    Temp 98.7 °F (37.1 °C) (Oral)    Resp 18    Ht 5' (1.524 m)    Wt 129 lb (58.5 kg)    SpO2 96%    BMI 25.19 kg/m2     Gen: Mildly ill appearing female  HEENT:   PERRL,normal conjunctiva. External ear and canals normal, TMs no opacification or erythema,  Swollen nasal turbinates, no sinus pain on palpation,  OP no erythema, no exudates, MMM  Neck:   No masses or LAD  Resp:  No wheezing, no rhonchi, no rales. CV:  RRR, normal S1S2, no murmur. Assessment/Plan:       ICD-10-CM ICD-9-CM    1. Cough R05 786.2    2. Subacute maxillary sinusitis J01.00 461.0 levoFLOXacin (LEVAQUIN) 500 mg tablet       Follow-up Disposition:  Return if symptoms worsen or fail to improve.     Beatriz Corbin MD

## 2018-02-02 DIAGNOSIS — F41.9 ANXIETY AND DEPRESSION: ICD-10-CM

## 2018-02-02 DIAGNOSIS — F32.A ANXIETY AND DEPRESSION: ICD-10-CM

## 2018-02-02 RX ORDER — LORAZEPAM 1 MG/1
TABLET ORAL
Qty: 60 TAB | Refills: 0 | OUTPATIENT
Start: 2018-02-02 | End: 2018-03-05 | Stop reason: SDUPTHER

## 2018-02-02 NOTE — TELEPHONE ENCOUNTER
Approved Medications  LORazepam (ATIVAN) 1 mg tablet  TAKE 1 TABLET BY MOUTH TWICE A DAY AS NEEDED FOR ANXIETY       Disp: 60 Tab Refills: 0    Class: Phone In Start: 2/2/2018   For: Anxiety and depression  Approved by: Zander Tobin MD  To pharmacy: Not to exceed 5 additional fills before 06/27/2018  Phoned in Ativan to [phone] per  request. [facility]

## 2018-02-19 RX ORDER — BUPROPION HYDROCHLORIDE 300 MG/1
TABLET ORAL
Qty: 90 TAB | Refills: 2 | Status: SHIPPED | OUTPATIENT
Start: 2018-02-19 | End: 2018-06-14

## 2018-03-05 DIAGNOSIS — F41.9 ANXIETY AND DEPRESSION: ICD-10-CM

## 2018-03-05 DIAGNOSIS — F32.A ANXIETY AND DEPRESSION: ICD-10-CM

## 2018-03-05 RX ORDER — LORAZEPAM 1 MG/1
TABLET ORAL
Qty: 60 TAB | Refills: 0 | OUTPATIENT
Start: 2018-03-05 | End: 2018-04-03 | Stop reason: SDUPTHER

## 2018-03-06 NOTE — TELEPHONE ENCOUNTER
Approved Medications  LORazepam (ATIVAN) 1 mg tablet  TAKE 1 TABLET BY MOUTH TWICE A DAY AS NEEDED ANXIETY       Disp: 60 Tab Refills: 0    Class: Phone In Start: 3/5/2018   For: Anxiety and depression  Approved by: Melecio Salas MD  To pharmacy: Not to exceed 5 additional fills before 08/01/2018  Phoned in Ativan to [facility] per  request. [facility]

## 2018-04-03 DIAGNOSIS — F41.9 ANXIETY AND DEPRESSION: ICD-10-CM

## 2018-04-03 DIAGNOSIS — F32.A ANXIETY AND DEPRESSION: ICD-10-CM

## 2018-04-03 RX ORDER — LORAZEPAM 1 MG/1
TABLET ORAL
Qty: 60 TAB | Refills: 0 | OUTPATIENT
Start: 2018-04-03 | End: 2018-05-05 | Stop reason: SDUPTHER

## 2018-04-27 ENCOUNTER — TELEPHONE (OUTPATIENT)
Dept: INTERNAL MEDICINE CLINIC | Age: 68
End: 2018-04-27

## 2018-04-27 NOTE — TELEPHONE ENCOUNTER
We received a note from Dr. Savanna Mackey, he did labs and her thyroid was off. Please call Dr. Savanna Mackey for labs,  Recent TSH. Also, confirm with patient that she is taking 112 mcg levothyroxine daily. We may need to change dose.

## 2018-04-30 NOTE — TELEPHONE ENCOUNTER
Faxed request for most recent labs to [address] [place] [address] at [phone]. Fax confirmation received. Called patient. Two patient identifiers verified. Advised of request for labs sent to Dr. Nikko Parekh office. Patient confirms she is taking levothyroxine 112 mcg daily in the morning. Patient also states she is not feeling well. States her nausea has increased. Also having diarrhea. This has been going on for 8 months but getting worse. Offered appointment to see Dr. Marlon Simmons at 0900 AM tomorrow. Patient accepted appointment and scheduled in [place].

## 2018-05-01 ENCOUNTER — OFFICE VISIT (OUTPATIENT)
Dept: INTERNAL MEDICINE CLINIC | Age: 68
End: 2018-05-01

## 2018-05-01 VITALS
RESPIRATION RATE: 18 BRPM | BODY MASS INDEX: 24.03 KG/M2 | DIASTOLIC BLOOD PRESSURE: 77 MMHG | OXYGEN SATURATION: 99 % | SYSTOLIC BLOOD PRESSURE: 131 MMHG | HEIGHT: 60 IN | HEART RATE: 79 BPM | TEMPERATURE: 98.2 F | WEIGHT: 122.4 LBS

## 2018-05-01 DIAGNOSIS — F51.01 PRIMARY INSOMNIA: ICD-10-CM

## 2018-05-01 DIAGNOSIS — F32.A ANXIETY AND DEPRESSION: ICD-10-CM

## 2018-05-01 DIAGNOSIS — E03.9 ACQUIRED HYPOTHYROIDISM: ICD-10-CM

## 2018-05-01 DIAGNOSIS — F41.9 ANXIETY AND DEPRESSION: ICD-10-CM

## 2018-05-01 DIAGNOSIS — R11.0 NAUSEA: Primary | ICD-10-CM

## 2018-05-01 DIAGNOSIS — Z12.31 ENCOUNTER FOR SCREENING MAMMOGRAM FOR BREAST CANCER: ICD-10-CM

## 2018-05-01 DIAGNOSIS — R19.7 DIARRHEA, UNSPECIFIED TYPE: ICD-10-CM

## 2018-05-01 RX ORDER — ONDANSETRON 4 MG/1
4 TABLET, ORALLY DISINTEGRATING ORAL
Qty: 30 TAB | Refills: 0 | Status: SHIPPED | OUTPATIENT
Start: 2018-05-01 | End: 2019-03-13 | Stop reason: SDUPTHER

## 2018-05-01 NOTE — MR AVS SNAPSHOT
[address] [facility] 
[phone] Patient: Shukri Lopez MRN: [id number] SBX:3/29/3762 Visit Information Date & Time Provider Department Dept. Phone Encounter #  
 5/1/2018  9:00 AM Artur Vivas, [address] [facility] [id number] Follow-up Instructions Return for FOLLOW UP AS SCHEDULED IN JUNE FOR PHYSICAL/PAP/FASTING LABS. Your Appointments 6/14/2018  1:30 PM  
PHYSICAL PRE OP with Artur Vivas MD  
[facility]) Appt Note: WELCOME TO MEDICARE CPE  PLEASE CODE THIS WAY  
 [phone] [id number]  
[address] [facility] Upcoming Health Maintenance Date Due Hepatitis C Screening 1950 DTaP/Tdap/Td series (1 - Tdap) 9/24/1973 FOBT Q 1 YEAR AGE 50-75 9/24/2002 ZOSTER VACCINE AGE 60> 7/24/2012 GLAUCOMA SCREENING Q2Y 9/24/2017 MEDICARE YEARLY EXAM 3/14/2018 Influenza Age 5 to Adult 8/1/2018 Pneumococcal 65+ Low/Medium Risk (2 of 2 - PPSV23) 10/1/2018 BREAST CANCER SCRN MAMMOGRAM 10/18/2019 Allergies as of 5/1/2018  Review Complete On: 5/1/2018 By: Rony Pfeiffer LPN No Known Allergies Current Immunizations  Reviewed on 1/31/2018 Name Date Influenza Vaccine 10/7/2015 Pneumococcal Conjugate (PCV-13) 10/1/2017 Not reviewed this visit You Were Diagnosed With   
  
 Codes Comments Nausea    -  Primary ICD-10-CM: R11.0 ICD-9-CM: 787.02 Diarrhea, unspecified type     ICD-10-CM: R19.7 ICD-9-CM: 787.91 Acquired hypothyroidism     ICD-10-CM: E03.9 ICD-9-CM: 244.9 Primary insomnia     ICD-10-CM: F51.01 
ICD-9-CM: 307.42 Anxiety and depression     ICD-10-CM: F41.9, F32.9 ICD-9-CM: 300.00, 311  Encounter for screening mammogram for breast cancer     ICD-10-CM: Z12.31 
 ICD-9-CM: V33.17 Vitals BP Pulse Temp Resp Height(growth percentile) Weight(growth percentile) 131/77 (BP 1 Location: Left arm, BP Patient Position: Sitting) 79 98.2 °F (36.8 °C) (Oral) 18 5' (1.524 m) 122 lb 6.4 oz (55.5 kg) SpO2 BMI OB Status Smoking Status 99% 23.9 kg/m2 Postmenopausal Former Smoker BMI and BSA Data Body Mass Index Body Surface Area  
 23.9 kg/m 2 1.53 m 2 Preferred Pharmacy Pharmacy Name Phone [id number] [phone] Your Updated Medication List  
  
   
This list is accurate as of 5/1/18  9:47 AM.  Always use your most recent med list.  
  
  
  
  
 ALEVE 220 mg Cap Generic drug:  naproxen sodium Take  by mouth. buPROPion  mg XL tablet Commonly known as:  WELLBUTRIN XL  
TAKE 1 TABLET EVERY MORNING  
  
 FLUZONE HIGH-DOSE 2017-18 (PF) Syrg injection Generic drug:  influenza vaccine 2017-18 (65 yrs+)(PF)  
FLU  
  
 levothyroxine 112 mcg tablet Commonly known as:  SYNTHROID  
TAKE 1 TAB BY MOUTH DAILY. LORazepam 1 mg tablet Commonly known as:  ATIVAN  
TAKE 1 TABLET BY MOUTH TWICE A DAY  
  
 melatonin Tab tablet Take 5 mg by mouth nightly. ondansetron 4 mg disintegrating tablet Commonly known as:  ZOFRAN ODT Take 1 Tab by mouth every eight (8) hours as needed for Nausea. PREVNAR 13 (PF) 0.5 mL Syrg injection Generic drug:  pneumococcal 13 fco conj dip TO BE ADMINISTERED BY THE PHARMACIST  
  
 PROBIOTIC (B. COAGULANS) PO Take  by mouth. traZODone 150 mg tablet Commonly known as:  DESYREL  
TAKE 1 TABLET BY MOUTH NIGHTLY. Prescriptions Sent to Pharmacy Refills  
 ondansetron (ZOFRAN ODT) 4 mg disintegrating tablet 0 Sig: Take 1 Tab by mouth every eight (8) hours as needed for Nausea. Class: Normal  
 Pharmacy: [facility], [address] [place] [address] #: [phone]  Route: Oral  
 Follow-up Instructions Return for FOLLOW UP AS SCHEDULED IN JUNE FOR PHYSICAL/PAP/FASTING LABS. To-Do List   
 05/01/2018 Imaging:  [facility] MAMMO BI SCREENING INCL CAD Patient Instructions FODMAPS DIET. AVOID FATS, DAIRY, UNCOOKED VEGGIES. KEEP A FOOD DIARY. Introducing [facility] & HEALTH SERVICES! Dear Mario Night: 
Thank you for requesting a Drivewyze account. Our records indicate that you already have an active Drivewyze account. You can access your account anytime at [email or web address] Did you know that you can access your hospital and ER discharge instructions at any time in Drivewyze? You can also review all of your test results from your hospital stay or ER visit. Additional Information If you have questions, please visit the Frequently Asked Questions section of the Drivewyze website at [email or web address] Remember, Drivewyze is NOT to be used for urgent needs. For medical emergencies, dial 911. Now available from your iPhone and Android! Please provide this summary of care documentation to your next provider. Your primary care clinician is listed as Rashid Nair. If you have any questions after today's visit, please call [phone].

## 2018-05-01 NOTE — PROGRESS NOTES
Mario Aviles is a 74 y.o. female who presents with 8 months of diarrhea and 2-3 weeks of nausea, no emesis. No GERD. Taking peptobismol, imodium, and zofran. Saw Dr. Karoline Estrellaly, GI, and was not happy with visit. Had labs. Is a little hyperthryoid, on 0.112mg daily. No further testing done. He recommended dietary changes. She drinks one cup of coffee a day. Started probiotics 6 weeks ago, no change in symptoms. She does notice fatty foods cause symptoms. Will take imodium prn. Daughter in law is NP for GI in [place]. Is not sleeping well 3-4 days a week. Having to nap in afternoon. Is on wellbutrin XL 300mg once in am.  Not helpful for depression. To see psych NP. Using ativan 1mg for panic attacks and for sleep at times. 1-2 times a week. On trazodone 150mg nightly. Having headaches 2-3 per week. Was letting horse in a barn and was hit by gate in left eye one week ago. Getting better. Taking tylenol for headaches. Past Medical History:   Diagnosis Date    Anemia     Anxiety     Breast cancer ([facility] [address] 2005    right lumpectomy    Chronic pain     neck pain    Cough     Depression     Hemorrhoid     Menopause 2005    Muscle pain     Neck problem     Pneumonia     sepis    S/P radiation therapy 2005    Sleep trouble     Thyroid disease     Urine troubles     unable to controll       Family History   Problem Relation Age of Onset    Cancer Mother        Social History     Social History    Marital status:      Spouse name: N/A    Number of children: N/A    Years of education: N/A     Occupational History    Not on file.      Social History Main Topics    Smoking status: Former Smoker     Quit date: 1/1/2010    Smokeless tobacco: Never Used    Alcohol use 8.4 oz/week     14 Shots of liquor per week    Drug use: No    Sexual activity: Yes     Partners: Male     Birth control/ protection: None     Other Topics Concern    Not on file     Social History Narrative Current Outpatient Prescriptions on File Prior to Visit   Medication Sig Dispense Refill    LORazepam (ATIVAN) 1 mg tablet TAKE 1 TABLET BY MOUTH TWICE A DAY 60 Tab 0    buPROPion XL (WELLBUTRIN XL) 300 mg XL tablet TAKE 1 TABLET EVERY MORNING 90 Tab 2    FLUZONE HIGH-DOSE 2017-18, PF, syrg injection FLU  0    PREVNAR 13, PF, 0.5 mL syrg injection TO BE ADMINISTERED BY THE PHARMACIST  0    naproxen sodium (ALEVE) 220 mg cap Take  by mouth.  traZODone (DESYREL) 150 mg tablet TAKE 1 TABLET BY MOUTH NIGHTLY. 30 Tab 3    levothyroxine (SYNTHROID) 112 mcg tablet TAKE 1 TAB BY MOUTH DAILY. 90 Tab 3    melatonin tab tablet Take 5 mg by mouth nightly. No current facility-administered medications on file prior to visit. Review of Systems  Pertinent items are noted in HPI. Objective:     Visit Vitals    /77 (BP 1 Location: Left arm, BP Patient Position: Sitting)    Pulse 79    Temp 98.2 °F (36.8 °C) (Oral)    Resp 18    Ht 5' (1.524 m)    Wt 122 lb 6.4 oz (55.5 kg)    SpO2 99%    BMI 23.9 kg/m2     Gen: well appearing female  HEENT:   PERRL,normal conjunctiva. External ear and canals normal, TMs no opacification or erythema,  OP no erythema, no exudates, MMM  Neck:  Supple. Thyroid normal size, nontender, without nodules. No masses or LAD  Resp:  No wheezing, no rhonchi, no rales. CV:  RRR, normal S1S2, no murmur. GI: soft, nontender, without masses. No hepatosplenomegaly. Extrem:  +2 pulses, no edema, warm distally      Assessment/Plan:       ICD-10-CM ICD-9-CM    1. Nausea R11.0 787.02 ondansetron (ZOFRAN ODT) 4 mg disintegrating tablet   2. Diarrhea, unspecified type R19.7 787.91    3. Acquired hypothyroidism E03.9 244.9    4. Primary insomnia F51.01 307.42    5. Anxiety and depression F41.9 300.00     F32.9 311    6. Encounter for screening mammogram for breast cancer Z12.31 V76.12 [facility] MAMMO BI SCREENING INCL CAD   FODMAPS DIET. AVOID FATS, DAIRY, UNCOOKED VEGGIES. KEEP A FOOD DIARY. Follow-up Disposition:  Return for FOLLOW UP AS SCHEDULED IN JUNE FOR PHYSICAL/PAP/FASTING LABS.     Bernabe Gerber MD

## 2018-05-05 DIAGNOSIS — F41.9 ANXIETY AND DEPRESSION: ICD-10-CM

## 2018-05-05 DIAGNOSIS — F32.A ANXIETY AND DEPRESSION: ICD-10-CM

## 2018-05-07 RX ORDER — LORAZEPAM 1 MG/1
TABLET ORAL
Qty: 60 TAB | Refills: 0 | OUTPATIENT
Start: 2018-05-07 | End: 2018-07-27 | Stop reason: SDUPTHER

## 2018-05-07 NOTE — TELEPHONE ENCOUNTER
Approved Medications  LORazepam (ATIVAN) 1 mg tablet  TAKE 1 TABLET BY MOUTH TWICE A DAY AS NEEDED       Disp: 60 Tab Refills: 0    Class: Phone In Start: 5/7/2018   For: Anxiety and depression  Approved by: Royal Yanely MD  To pharmacy: Not to exceed 5 additional fills before 10/01/2018   Phoned in Ativan to [phone] per  request. Lvm    Fax confirmation received from Dr. Vincent Ormond office.

## 2018-05-07 NOTE — TELEPHONE ENCOUNTER
Please locate thyroid labs from DR. Whitmore's office. They were supposed to send them over. Maybe they can refax them.

## 2018-05-09 ENCOUNTER — TELEPHONE (OUTPATIENT)
Dept: INTERNAL MEDICINE CLINIC | Age: 68
End: 2018-05-09

## 2018-05-09 DIAGNOSIS — E03.9 ACQUIRED HYPOTHYROIDISM: Primary | ICD-10-CM

## 2018-05-09 RX ORDER — LEVOTHYROXINE SODIUM 100 UG/1
100 TABLET ORAL
Qty: 90 TAB | Refills: 3 | Status: SHIPPED | OUTPATIENT
Start: 2018-05-09 | End: 2018-08-09 | Stop reason: SDUPTHER

## 2018-05-09 NOTE — TELEPHONE ENCOUNTER
Advise patient we received labs from Dr. Marilou Pope. Her TSH is  slightly suppressed. We can reduce levothyroxine to 100 mcg once a day. Follow up as scheduled June 14. We will need to defer the thyroid labs until on new dose at least 8 weeks.

## 2018-05-09 NOTE — TELEPHONE ENCOUNTER
Called patient. Two patient identifiers verified. Advised of lab results and recommendations per Dr. Carlita Zepeda. Patient verbalized understanding and states no questions at this time.

## 2018-06-08 ENCOUNTER — TELEPHONE (OUTPATIENT)
Dept: INTERNAL MEDICINE CLINIC | Age: 68
End: 2018-06-08

## 2018-06-08 DIAGNOSIS — E03.9 ACQUIRED HYPOTHYROIDISM: Primary | ICD-10-CM

## 2018-06-08 DIAGNOSIS — Z11.59 NEED FOR HEPATITIS C SCREENING TEST: ICD-10-CM

## 2018-06-08 DIAGNOSIS — Z13.220 SCREENING FOR HYPERLIPIDEMIA: ICD-10-CM

## 2018-06-08 NOTE — TELEPHONE ENCOUNTER
Pt is requesting an order for labs prior to her appt on 06/14/2018 at 1:30pm . P [phone]       Message received & copied from [facility]

## 2018-06-11 ENCOUNTER — APPOINTMENT (OUTPATIENT)
Dept: INTERNAL MEDICINE CLINIC | Age: 68
End: 2018-06-11

## 2018-06-11 ENCOUNTER — HOSPITAL ENCOUNTER (OUTPATIENT)
Dept: LAB | Age: 68
Discharge: HOME | End: 2018-06-11
Payer: MEDICARE

## 2018-06-11 DIAGNOSIS — Z11.59 NEED FOR HEPATITIS C SCREENING TEST: ICD-10-CM

## 2018-06-11 DIAGNOSIS — Z13.220 SCREENING FOR HYPERLIPIDEMIA: ICD-10-CM

## 2018-06-11 DIAGNOSIS — E03.9 ACQUIRED HYPOTHYROIDISM: ICD-10-CM

## 2018-06-11 PROCEDURE — 85027 COMPLETE CBC AUTOMATED: CPT

## 2018-06-11 PROCEDURE — 80053 COMPREHEN METABOLIC PANEL: CPT

## 2018-06-11 PROCEDURE — 36415 COLL VENOUS BLD VENIPUNCTURE: CPT

## 2018-06-11 PROCEDURE — 80061 LIPID PANEL: CPT

## 2018-06-11 PROCEDURE — 86803 HEPATITIS C AB TEST: CPT

## 2018-06-11 PROCEDURE — 84443 ASSAY THYROID STIM HORMONE: CPT

## 2018-06-11 NOTE — TELEPHONE ENCOUNTER
MD Jackson Brown LPN        Caller: Unspecified (3 days ago,  1:00 PM)                     Labs in system. Left message for patient that labs have been ordered and she may stop by at her convenience to have them drawn.

## 2018-06-13 LAB
ALBUMIN SERPL-MCNC: 4.2 G/DL (ref 3.6–4.8)
ALBUMIN/GLOB SERPL: 1.6 {RATIO} (ref 1.2–2.2)
ALP SERPL-CCNC: 46 IU/L (ref 39–117)
ALT SERPL-CCNC: 13 IU/L (ref 0–32)
AST SERPL-CCNC: 19 IU/L (ref 0–40)
BILIRUB SERPL-MCNC: 0.4 MG/DL (ref 0–1.2)
BUN SERPL-MCNC: 12 MG/DL (ref 8–27)
BUN/CREAT SERPL: 14 (ref 12–28)
CALCIUM SERPL-MCNC: 9 MG/DL (ref 8.7–10.3)
CHLORIDE SERPL-SCNC: 100 MMOL/L (ref 96–106)
CHOLEST SERPL-MCNC: 209 MG/DL (ref 100–199)
CO2 SERPL-SCNC: 27 MMOL/L (ref 20–29)
CREAT SERPL-MCNC: 0.83 MG/DL (ref 0.57–1)
ERYTHROCYTE [DISTWIDTH] IN BLOOD BY AUTOMATED COUNT: 12.9 % (ref 12.3–15.4)
GFR SERPLBLD CREATININE-BSD FMLA CKD-EPI: 74 ML/MIN/1.73
GFR SERPLBLD CREATININE-BSD FMLA CKD-EPI: 86 ML/MIN/1.73
GLOBULIN SER CALC-MCNC: 2.6 G/DL (ref 1.5–4.5)
GLUCOSE SERPL-MCNC: 90 MG/DL (ref 65–99)
HCT VFR BLD AUTO: 36.8 % (ref 34–46.6)
HCV AB S/CO SERPL IA: 0.1 S/CO RATIO (ref 0–0.9)
HCV AB SERPL QL IA: NORMAL
HDLC SERPL-MCNC: 80 MG/DL
HGB BLD-MCNC: 12.2 G/DL (ref 11.1–15.9)
LDLC SERPL CALC-MCNC: 112 MG/DL (ref 0–99)
MCH RBC QN AUTO: 33.4 PG (ref 26.6–33)
MCHC RBC AUTO-ENTMCNC: 33.2 G/DL (ref 31.5–35.7)
MCV RBC AUTO: 101 FL (ref 79–97)
PLATELET # BLD AUTO: 373 X10E3/UL (ref 150–379)
POTASSIUM SERPL-SCNC: 4.2 MMOL/L (ref 3.5–5.2)
PROT SERPL-MCNC: 6.8 G/DL (ref 6–8.5)
RBC # BLD AUTO: 3.65 X10E6/UL (ref 3.77–5.28)
SODIUM SERPL-SCNC: 142 MMOL/L (ref 134–144)
TRIGL SERPL-MCNC: 84 MG/DL (ref 0–149)
TSH SERPL DL<=0.005 MIU/L-ACNC: 38.76 UIU/ML (ref 0.45–4.5)
VLDLC SERPL CALC-MCNC: 17 MG/DL (ref 5–40)
WBC # BLD AUTO: 7.7 X10E3/UL (ref 3.4–10.8)

## 2018-06-14 ENCOUNTER — OFFICE VISIT (OUTPATIENT)
Dept: INTERNAL MEDICINE CLINIC | Age: 68
End: 2018-06-14

## 2018-06-14 VITALS
RESPIRATION RATE: 16 BRPM | OXYGEN SATURATION: 99 % | BODY MASS INDEX: 24.03 KG/M2 | DIASTOLIC BLOOD PRESSURE: 68 MMHG | HEART RATE: 80 BPM | HEIGHT: 60 IN | WEIGHT: 122.4 LBS | SYSTOLIC BLOOD PRESSURE: 147 MMHG | TEMPERATURE: 99.1 F

## 2018-06-14 DIAGNOSIS — Z00.00 WELCOME TO MEDICARE PREVENTIVE VISIT: Primary | ICD-10-CM

## 2018-06-14 DIAGNOSIS — F32.A ANXIETY AND DEPRESSION: ICD-10-CM

## 2018-06-14 DIAGNOSIS — E03.9 ACQUIRED HYPOTHYROIDISM: ICD-10-CM

## 2018-06-14 DIAGNOSIS — F51.01 PRIMARY INSOMNIA: ICD-10-CM

## 2018-06-14 DIAGNOSIS — F41.9 ANXIETY AND DEPRESSION: ICD-10-CM

## 2018-06-14 DIAGNOSIS — Z23 ENCOUNTER FOR IMMUNIZATION: ICD-10-CM

## 2018-06-14 RX ORDER — MIRTAZAPINE 15 MG/1
TABLET, FILM COATED ORAL
Refills: 0 | COMMUNITY
Start: 2018-05-12 | End: 2019-03-13

## 2018-06-14 NOTE — MR AVS SNAPSHOT
[address] [facility] 
[phone] Patient: Nisha Timmons MRN: [id number] PRACHI:[birth year] Visit Information Date & Time Provider Department Dept. Phone Encounter #  
 6/14/2018  1:30 PM Dilip Cohn, [address] [place] [address],[address] [phone] [id number] Follow-up Instructions Return for follow up annual and as needed. [employer] Upcoming Health Maintenance Date Due DTaP/Tdap/Td series (1 - Tdap) 9/24/1973 ZOSTER VACCINE AGE 60> 7/24/2012 GLAUCOMA SCREENING Q2Y 9/24/2017 Influenza Age 5 to Adult 8/1/2018 Pneumococcal 65+ Low/Medium Risk (2 of 2 - PPSV23) 10/1/2018 MEDICARE YEARLY EXAM 6/15/2019 BREAST CANCER SCRN MAMMOGRAM 10/18/2019 COLONOSCOPY 6/5/2023 Allergies as of 6/14/2018  Review Complete On: 6/14/2018 By: Sp Dumont LPN No Known Allergies Current Immunizations  Reviewed on 6/14/2018 Name Date Influenza Vaccine 10/7/2015 Pneumococcal Conjugate (PCV-13) 10/1/2017 Reviewed by Dilip Cohn MD on 6/14/2018 at  2:00 PM  
You Were Diagnosed With   
  
 Codes Comments Welcome to Medicare preventive visit    -  Primary ICD-10-CM: Z00.00 ICD-9-CM: V70.0 Acquired hypothyroidism     ICD-10-CM: E03.9 ICD-9-CM: 244.9 Anxiety and depression     ICD-10-CM: F41.9, F32.9 ICD-9-CM: 300.00, 311 Primary insomnia     ICD-10-CM: F51.01 
ICD-9-CM: 307.42 Encounter for immunization     ICD-10-CM: Q41 ICD-9-CM: V03.89 Vitals BP Pulse Temp Resp Height(growth percentile) Weight(growth percentile) 147/68 (BP 1 Location: Left arm, BP Patient Position: Sitting) 80 99.1 °F (37.3 °C) (Oral) 16 5' (1.524 m) 122 lb 6.4 oz (55.5 kg) SpO2 BMI OB Status Smoking Status 99% 23.9 kg/m2 Postmenopausal Former Smoker BMI and BSA Data Body Mass Index Body Surface Area  
 23.9 kg/m 2 1.53 m 2 Preferred Pharmacy Pharmacy Name Phone [id number] [phone] Your Updated Medication List  
  
   
This list is accurate as of 18  2:13 PM.  Always use your most recent med list.  
  
  
  
  
 ALEVE 220 mg Cap Generic drug:  naproxen sodium Take  by mouth. FLUZONE HIGH-DOSE  (PF) Syrg injection Generic drug:  influenza vaccine  (65 yrs+)(PF)  
FLU  
  
 levothyroxine 100 mcg tablet Commonly known as:  SYNTHROID Take 1 Tab by mouth Daily (before breakfast). LORazepam 1 mg tablet Commonly known as:  ATIVAN  
TAKE 1 TABLET BY MOUTH TWICE A DAY AS NEEDED  
  
 mirtazapine 15 mg tablet Commonly known as:  REMERON  
TAKE 1 TABLET BY MOUTH AT BEDTIME  
  
 ondansetron 4 mg disintegrating tablet Commonly known as:  ZOFRAN ODT Take 1 Tab by mouth every eight (8) hours as needed for Nausea. PREVNAR 13 (PF) 0.5 mL Syrg injection Generic drug:  pneumococcal 13 fco conj dip TO BE ADMINISTERED BY THE PHARMACIST  
  
 PROBIOTIC (B. COAGULANS) PO Take  by mouth. traZODone 150 mg tablet Commonly known as:  DESYREL  
TAKE 1 TABLET BY MOUTH NIGHTLY.  
  
 varicella-zoster recombinant (PF) 50 mcg/0.5 mL Susr injection Commonly known as:  SHINGRIX  
0.5 mL by IntraMUSCular route once for 1 dose. Prescriptions Printed Refills  
 varicella-zoster recombinant, PF, (SHINGRIX) 50 mcg/0.5 mL susr injection 1 Si.5 mL by IntraMUSCular route once for 1 dose. Class: Print Route: IntraMUSCular Follow-up Instructions Return for follow up annual and as needed. [place] To-Do List   
 2018 Lab:  TSH 3RD GENERATION Patient Instructions Medicare Wellness Visit, Female The best way to live healthy is to have a lifestyle where you eat a well-balanced diet, exercise regularly, limit alcohol use, and quit all forms of tobacco/nicotine, if applicable. Regular preventive services are another way to keep healthy. Preventive services (vaccines, screening tests, monitoring & exams) can help personalize your care plan, which helps you manage your own care. Screening tests can find health problems at the earliest stages, when they are easiest to treat. [facility] follows the current, evidence-based guidelines published by the [facility] Bebeto Hobbs (USPSTF) when recommending preventive services for our patients. Because we follow these guidelines, sometimes recommendations change over time as research supports it. (For example, mammograms used to be recommended annually. Even though Medicare will still pay for an annual mammogram, the newer guidelines recommend a mammogram every two years for women of average risk.) Of course, you and your provider may decide to screen more often for some diseases, based on your risk and co-morbidities (chronic disease you are already diagnosed with). Preventive services for you include: - Medicare offers their members a free annual wellness visit, which is time for you and your primary care provider to discuss and plan for your preventive service needs. Take advantage of this benefit every year! 
 
-All people over age 72 should receive the recommended pneumonia vaccines. Current USPSTF guidelines recommend a series of two vaccines for the best pneumonia protection.  
 
-All adults should have a yearly flu vaccine and a tetanus vaccine every 10 years. All adults age 61 years should receive a shingles vaccine once in their lifetime.   
 
-A bone mass density test is recommended when a woman turns 65 to screen for osteoporosis. This test is only recommended once as a screening. Some providers will use this same test as a disease monitoring tool if you already have osteoporosis.  
 
-All adults age 38-68 years who are overweight should have a diabetes screening test once every three years.  
 
-Other screening tests & preventive services for persons with diabetes include: an eye exam to screen for diabetic retinopathy, a kidney function test, a foot exam, and stricter control over your cholesterol.  
 
-Cardiovascular screening for adults with routine risk involves an electrocardiogram (ECG) at intervals determined by the provider.  
 
-Colorectal cancer screenings should be done for adults age 54-65 years with normal risk. There are a number of acceptable methods of screening for this type of cancer. Each test has its own benefits and drawbacks. Discuss with your provider what is most appropriate for you during your annual wellness visit. The different tests include: colonoscopy (considered the best screening method), a fecal occult blood test, a fecal DNA test, and sigmoidoscopy. -Breast cancer screenings are recommended every other year for women of normal risk age 54-69 years.  
 
-Cervical cancer screenings for women over age 72 are only recommended with certain risk factors.  
 
-All adults born between [facility] should be screened once for Hepatitis C. Here is a list of your current Health Maintenance items (your personalized list of preventive services) with a due date: 
Health Maintenance Due Topic Date Due  
 DTaP/Tdap/Td  (1 - Tdap) 09/24/1973  Shingles Vaccine  07/24/2012  Glaucoma Screening   09/24/2017 Blood pressure upper number always under 150. Recheck thyroid 8 weeks. Introducing [facility] & HEALTH SERVICES! Dear Bronson Mccray: 
Thank you for requesting a [employer] account. Our records indicate that you already have an active [employer] account. You can access your account anytime at [email or web address] Did you know that you can access your hospital and ER discharge instructions at any time in [employer]? You can also review all of your test results from your hospital stay or ER visit. Additional Information If you have questions, please visit the Frequently Asked Questions section of the [employer] website at [email or web address] Remember, MobileSuites is NOT to be used for urgent needs. For medical emergencies, dial 911. Now available from your iPhone and Android! Please provide this summary of care documentation to your next provider. Your primary care clinician is listed as Patricia Perez. If you have any questions after today's visit, please call [phone].

## 2018-06-14 NOTE — PATIENT INSTRUCTIONS
Medicare Wellness Visit, Female    The best way to live healthy is to have a lifestyle where you eat a well-balanced diet, exercise regularly, limit alcohol use, and quit all forms of tobacco/nicotine, if applicable. Regular preventive services are another way to keep healthy. Preventive services (vaccines, screening tests, monitoring & exams) can help personalize your care plan, which helps you manage your own care. Screening tests can find health problems at the earliest stages, when they are easiest to treat. [address] follows the current, evidence-based guidelines published by the [facility] Bebeto Faby ([facility]) when recommending preventive services for our patients. Because we follow these guidelines, sometimes recommendations change over time as research supports it. (For example, mammograms used to be recommended annually. Even though Medicare will still pay for an annual mammogram, the newer guidelines recommend a mammogram every two years for women of average risk.)    Of course, you and your provider may decide to screen more often for some diseases, based on your risk and co-morbidities (chronic disease you are already diagnosed with). Preventive services for you include:    - Medicare offers their members a free annual wellness visit, which is time for you and your primary care provider to discuss and plan for your preventive service needs. Take advantage of this benefit every year!    -All people over age 72 should receive the recommended pneumonia vaccines. Current USPSTF guidelines recommend a series of two vaccines for the best pneumonia protection.     -All adults should have a yearly flu vaccine and a tetanus vaccine every 10 years. All adults age 61 years should receive a shingles vaccine once in their lifetime.      -A bone mass density test is recommended when a woman turns 65 to screen for osteoporosis.  This test is only recommended once as a screening. Some providers will use this same test as a disease monitoring tool if you already have osteoporosis. -All adults age 38-68 years who are overweight should have a diabetes screening test once every three years.     -Other screening tests & preventive services for persons with diabetes include: an eye exam to screen for diabetic retinopathy, a kidney function test, a foot exam, and stricter control over your cholesterol.     -Cardiovascular screening for adults with routine risk involves an electrocardiogram (ECG) at intervals determined by the provider.     -Colorectal cancer screenings should be done for adults age 54-65 years with normal risk. There are a number of acceptable methods of screening for this type of cancer. Each test has its own benefits and drawbacks. Discuss with your provider what is most appropriate for you during your annual wellness visit. The different tests include: colonoscopy (considered the best screening method), a fecal occult blood test, a fecal DNA test, and sigmoidoscopy. -Breast cancer screenings are recommended every other year for women of normal risk age 54-69 years.     -Cervical cancer screenings for women over age 72 are only recommended with certain risk factors.     -All adults born between [facility] should be screened once for Hepatitis C. Here is a list of your current Health Maintenance items (your personalized list of preventive services) with a due date:  Health Maintenance Due   Topic Date Due    DTaP/Tdap/Td  (1 - Tdap) 09/24/1973    Shingles Vaccine  07/24/2012    Glaucoma Screening   09/24/2017         Blood pressure upper number always under 150. Recheck thyroid 8 weeks.

## 2018-06-14 NOTE — PROGRESS NOTES
This is a \"Welcome to [employer]"  Initial Preventive Physical Examination (IPPE) providing Personalized Prevention Plan Services (Performed in the first 12 months of enrollment)    I have reviewed the patient's medical history in detail and updated the computerized patient record. History     Past Medical History:   Diagnosis Date    Anemia     Anxiety     Breast cancer ([address] 2005    right lumpectomy    Chronic pain     neck pain    Cough     Depression     Hemorrhoid     Menopause 2005    Muscle pain     Neck problem     Pneumonia     sepis    S/P radiation therapy 2005    Sleep trouble     Thyroid disease     Urine troubles     unable to controll      Past Surgical History:   Procedure Laterality Date    HX BREAST LUMPECTOMY Right 2005    HX  SECTION      HX THYROIDECTOMY      IMPLANT BREAST SILICONE/EQ Bilateral 4901     Current Outpatient Prescriptions   Medication Sig Dispense Refill    levothyroxine (SYNTHROID) 100 mcg tablet Take 1 Tab by mouth Daily (before breakfast). 90 Tab 3    LORazepam (ATIVAN) 1 mg tablet TAKE 1 TABLET BY MOUTH TWICE A DAY AS NEEDED 60 Tab 0    Bacillus coagulans (PROBIOTIC, B. COAGULANS, PO) Take  by mouth.  FLUZONE HIGH-DOSE , PF, syrg injection FLU  0    PREVNAR 13, PF, 0.5 mL syrg injection TO BE ADMINISTERED BY THE PHARMACIST  0    mirtazapine (REMERON) 15 mg tablet TAKE 1 TABLET BY MOUTH AT BEDTIME  0    ondansetron (ZOFRAN ODT) 4 mg disintegrating tablet Take 1 Tab by mouth every eight (8) hours as needed for Nausea. 30 Tab 0    buPROPion XL (WELLBUTRIN XL) 300 mg XL tablet TAKE 1 TABLET EVERY MORNING 90 Tab 2    naproxen sodium (ALEVE) 220 mg cap Take  by mouth.  traZODone (DESYREL) 150 mg tablet TAKE 1 TABLET BY MOUTH NIGHTLY. 30 Tab 3    melatonin tab tablet Take 5 mg by mouth nightly.        No Known Allergies  Family History   Problem Relation Age of Onset    Cancer Mother      Social History   Substance Use Topics  Smoking status: Former Smoker     Quit date: 1/1/2010    Smokeless tobacco: Never Used    Alcohol use 8.4 oz/week     14 Shots of liquor per week     Diet, Lifestyle: No special diet    Exercise level: moderately active    Depression Risk Screen     PHQ over the last two weeks 9/28/2017   PHQ Not Done Active Diagnosis of Depression or Bipolar Disorder     Alcohol Risk Screen   One drink at night. Functional Ability and Level of Safety   Hearing Loss  Hearing is good. Vision Screening  Vision is good. No exam data present      Activities of Daily Living  The home contains: no safety equipment. Patient does total self care    Fall Risk Screen  Fall Risk Assessment, last 12 mths 6/14/2018   Able to walk? Yes   Fall in past 12 months? No   Fall with injury? -   Number of falls in past 12 months -   Fall Risk Score -       Abuse Screen  Patient is not abused    Screening EKG   EKG order placed: Yes    Patient Care Team   Patient Care Team:  Radu Maldonado MD as PCP - General (Internal Medicine)  Tanja Chang MD as Consulting Provider (Gastroenterology)     End of Life Planning   Advanced care planning directives were discussed with the patient and /or family/caregiver. Assessment/Plan   Education and counseling provided:  Are appropriate based on today's review and evaluation    Diagnoses and all orders for this visit:    1.  Welcome to Medicare preventive visit      Health Maintenance Due   Topic Date Due    DTaP/Tdap/Td series (1 - Tdap) 09/24/1973    FOBT Q 1 YEAR AGE 50-75  09/24/2002    ZOSTER VACCINE AGE 60>  07/24/2012    GLAUCOMA SCREENING Q2Y  09/24/2017

## 2018-06-14 NOTE — PROGRESS NOTES
Problem Focused Progress Note    Name: Andrew Chilel Date: 2018  Ethnicity: NON-  Race: WHITE OR   MRN: [id number]  Age: 74 y.o.  : 1950  Sex: Female       HPI:   Letty KAREN Pineda is a 74y.o. year old female who presents today for follow up. TSH had been suppressed and levothyroxine reduced from 112mcg to 110mcg. Reports that she is not consistent with her thyroid medication. TSH elevated. Reports ongoing nausea and diarrhea. Saw Dr. Ольга Antonio and then changed to Dr. Argentina Nunes. BM are still soft, but better. Nausea is better with diet changes. Used zofran for nausea, but peptobismol worked fine. Started on Remeron, appetite is better. Sleep is okay on Remeron, off trazodone, not taking lorazepam regularly, but will use it for panic or insomnia on occasion. Under some stress with elections. Prior zostavax, due to shringrix. May be due for pneumovax 23. Near normal DEXA . Mammogram due in 2018. Colonoscopy one week ago, Dr. Argentina Nunes, polyps. BP at home is normal.       Chief Complaint   Patient presents with   Ruthann Olea Annual Wellness Visit     Welcome to Medicare    Medication Evaluation   . Review of Systems   A comprehensive review of systems was negative except for that written in the HPI. Physical Examination     Visit Vitals    /68 (BP 1 Location: Left arm, BP Patient Position: Sitting)    Pulse 80    Temp 99.1 °F (37.3 °C) (Oral)    Resp 16    Ht 5' (1.524 m)    Wt 122 lb 6.4 oz (55.5 kg)    SpO2 99%    BMI 23.9 kg/m2     Gen: well appearing   HEENT:   PERRL,normal conjunctiva. External ear and canals normal, TMs no opacification or erythema,  OP no erythema, no exudates, MMM  Neck:  Supple. No masses or LAD  Resp:  No wheezing, no rhonchi, no rales. CV:  RRR, normal S1S2, no murmur. GI: soft, nontender, without masses. No hepatosplenomegaly.   Extrem:  +2 pulses, no edema, warm distally, bilateral bunions      Assessment/Plan       ICD-10-CM ICD-9-CM    1. Welcome to Medicare preventive visit Z00.00 V70.0    2. Acquired hypothyroidism E03.9 244.9 TSH 3RD GENERATION   3. Anxiety and depression F41.9 300.00     F32.9 311    4. Primary insomnia F51.01 307.42    5. Encounter for immunization Z23 V03.89 varicella-zoster recombinant, PF, (SHINGRIX) 50 mcg/0.5 mL susr injection     Blood pressure upper number always under 150. Recheck thyroid 8 weeks.         Daniel Arora MD  6/17/2018  1:51 PM

## 2018-06-14 NOTE — PROGRESS NOTES
Reviewed record in preparation for visit and have obtained necessary documentation. Identified pt with two pt identifiers(name and ). Chief Complaint   Patient presents with   Kenney Vasquez Annual Wellness Visit     [facility]    Medication Evaluation       Health Maintenance Due   Topic Date Due    DTaP/Tdap/Td  (1 - Tdap) 1973    Shingles Vaccine  2012    Glaucoma Screening   2017       Ms. Jerrica Butler has a reminder for a \"due or due soon\" health maintenance. I have asked that she discuss this further with her primary care provider for follow-up on this health maintenance. Coordination of Care Questionnaire:  :     1) Have you been to an emergency room, urgent care clinic since your last visit? no   Hospitalized since your last visit? no             2) Have you seen or consulted any other health care providers outside of [address] [place] [address] since your last visit? no  (Include any pap smears or colon screenings in this section.)    3) In the event something were to happen to you and you were unable to speak on your behalf, do you have an Advance Directive/ Living Will in place stating your wishes? NO    Do you have an Advance Directive on file? no    4) Are you interested in receiving information on Advance Directives? NO    Patient is accompanied by self I have received verbal consent from Madelyn Santiago to discuss any/all medical information while they are present in the room.

## 2018-06-19 ENCOUNTER — TELEPHONE (OUTPATIENT)
Dept: INTERNAL MEDICINE CLINIC | Age: 68
End: 2018-06-19

## 2018-07-23 ENCOUNTER — TELEPHONE (OUTPATIENT)
Dept: INTERNAL MEDICINE CLINIC | Age: 68
End: 2018-07-23

## 2018-07-23 NOTE — TELEPHONE ENCOUNTER
Identified patient 2 identifiers verified.  Patient requesting a call back  About diarhea and abdominal pain, she wants to know if she should have an ultrasound done

## 2018-07-24 NOTE — TELEPHONE ENCOUNTER
Call completed to patient, two identifiers verified. Patient advised to follow-up with Dr. Herbie Field, GI, about her ongoing diarrhea. Advised that  an ultrasound will not tell us anything new since she had a CT abdomen/pelvis and a colonoscopy. Understanding verbalized.

## 2018-07-27 DIAGNOSIS — F32.A ANXIETY AND DEPRESSION: ICD-10-CM

## 2018-07-27 DIAGNOSIS — F41.9 ANXIETY AND DEPRESSION: ICD-10-CM

## 2018-07-29 RX ORDER — LORAZEPAM 1 MG/1
TABLET ORAL
Qty: 60 TAB | Refills: 0 | OUTPATIENT
Start: 2018-07-29 | End: 2018-11-19 | Stop reason: SDUPTHER

## 2018-07-31 NOTE — TELEPHONE ENCOUNTER
Approved Medications  LORazepam (ATIVAN) 1 mg tablet  TAKE 1 TABLET BY MOUTH TWICE A DAY AS NEEDED       Disp: 60 Tab Refills: 0    Class: Phone In Start: 7/29/2018   For: Anxiety and depression  Approved by: Finley Bosworth, MD  To pharmacy: Not to exceed 5 additional fills before 10/01/2018    Phoned in Lorazepam to [facility] pharmacy per  request. davin

## 2018-08-09 DIAGNOSIS — E03.9 ACQUIRED HYPOTHYROIDISM: ICD-10-CM

## 2018-08-09 RX ORDER — LEVOTHYROXINE SODIUM 100 UG/1
100 TABLET ORAL
Qty: 90 TAB | Refills: 3 | Status: SHIPPED | OUTPATIENT
Start: 2018-08-09 | End: 2018-08-09 | Stop reason: SDUPTHER

## 2018-08-09 NOTE — TELEPHONE ENCOUNTER
Requested Prescriptions     Pending Prescriptions Disp Refills    levothyroxine (SYNTHROID) 100 mcg tablet 90 Tab 3     Sig: Take 1 Tab by mouth Daily (before breakfast). PCP: Howard Vivas MD    Last appt: 6/14/2018  No future appointments. Requested Prescriptions     Pending Prescriptions Disp Refills    levothyroxine (SYNTHROID) 100 mcg tablet 90 Tab 3     Sig: Take 1 Tab by mouth Daily (before breakfast).

## 2018-08-09 NOTE — TELEPHONE ENCOUNTER
Requested Prescriptions     Pending Prescriptions Disp Refills    levothyroxine (SYNTHROID) 100 mcg tablet 90 Tab 3     Sig: Take 1 Tab by mouth Daily (before breakfast). PCP: Raul Peñaloza MD    Last appt: 6/14/2018  No future appointments. Requested Prescriptions     Pending Prescriptions Disp Refills    levothyroxine (SYNTHROID) 100 mcg tablet 90 Tab 3     Sig: Take 1 Tab by mouth Daily (before breakfast).

## 2018-08-10 RX ORDER — LEVOTHYROXINE SODIUM 100 UG/1
112 TABLET ORAL
Qty: 90 TAB | Refills: 3 | Status: SHIPPED | OUTPATIENT
Start: 2018-08-10 | End: 2018-08-21 | Stop reason: DRUGHIGH

## 2018-08-17 ENCOUNTER — APPOINTMENT (OUTPATIENT)
Dept: INTERNAL MEDICINE CLINIC | Age: 68
End: 2018-08-17

## 2018-08-17 DIAGNOSIS — E03.9 ACQUIRED HYPOTHYROIDISM: ICD-10-CM

## 2018-08-18 LAB — TSH SERPL DL<=0.005 MIU/L-ACNC: 0.09 UIU/ML (ref 0.45–4.5)

## 2018-08-20 ENCOUNTER — TELEPHONE (OUTPATIENT)
Dept: INTERNAL MEDICINE CLINIC | Age: 68
End: 2018-08-20

## 2018-08-20 DIAGNOSIS — E03.9 ACQUIRED HYPOTHYROIDISM: ICD-10-CM

## 2018-08-20 NOTE — TELEPHONE ENCOUNTER
Please confirm dose of levothyroxine with patient. She is listed as taking levothyroxine 100mcg once a day. Is that what she is doing? Her TSH is suppressed, oversupplemented.   Let me know what she is taking and we will adjust dose

## 2018-08-21 RX ORDER — LEVOTHYROXINE SODIUM 100 UG/1
100 TABLET ORAL
Qty: 90 TAB | Refills: 3 | Status: SHIPPED | OUTPATIENT
Start: 2018-08-21 | End: 2018-09-25 | Stop reason: SDUPTHER

## 2018-08-21 NOTE — TELEPHONE ENCOUNTER
Spoke with patient. Two pt identifiers confirmed. Patient states that she has been taking 112mcg of her levothroxine for approximately the past 7 weeks. Advised patient that her TSH is suppressed, over supplemented and Dr. Rebeca Abel is going to need to adjust her dose. Advise patient that I will call her back with a plan once I have spoken with Dr. Rebeca Abel. Pt verbalized understanding of information discussed w/ no further questions at this time.

## 2018-08-22 NOTE — TELEPHONE ENCOUNTER
Spoke with patient. Two pt identifiers confirmed. Patient advised per Dr. Atlas Saint to go back to taking the 100mcg of her levothyroxine. Patient advised that she needs to schedule an appointment for a followup and repeat labs in 8 weeks. Offered to schedule appointment for patient. Patient states that she will call back. Pt verbalized understanding of information discussed w/ no further questions at this time.

## 2018-08-28 ENCOUNTER — TELEPHONE (OUTPATIENT)
Dept: INTERNAL MEDICINE CLINIC | Age: 68
End: 2018-08-28

## 2018-08-28 NOTE — TELEPHONE ENCOUNTER
----- Message from [address] [place], [place] [address]Josselin Tran sent at 8/21/2018 12:56 PM EDT -----  Regarding: Test Results Question  Contact: [phone]  I am wondering if I should consult an Endocrinologist for my continued issues. Later this week I will start a 24hr urine for 5-HIAA to perhaps r/o NET. With this newest TSH value so low (after returning to the [facility] I've taken for a long time without problems) I am concerned about metabolic issues. My diarrhea is under control as a side effect of starting Cholestyramine, flushing/hot flashes continue.

## 2018-09-06 ENCOUNTER — TELEPHONE (OUTPATIENT)
Dept: INTERNAL MEDICINE CLINIC | Age: 68
End: 2018-09-06

## 2018-09-06 RX ORDER — MIRTAZAPINE 15 MG/1
30 TABLET, FILM COATED ORAL
Qty: 30 TAB | Refills: 3 | Status: SHIPPED | OUTPATIENT
Start: 2018-09-06 | End: 2018-09-25 | Stop reason: SDUPTHER

## 2018-09-06 NOTE — TELEPHONE ENCOUNTER
MD Francisco Harvey, LPN        Caller: Unspecified (Today, 12:17 PM)                     We can fill her remeron through our office. Does she need it now?       Yes, I pended the prescription to you

## 2018-09-06 NOTE — TELEPHONE ENCOUNTER
#[phone] pt states the NP that handles her depression will no longer write her script and was advised to get from pcp.   Please call pt  Mirtazatine 30 mg (remeron)

## 2018-09-06 NOTE — TELEPHONE ENCOUNTER
Spoke with patient. Two pt identifiers confirmed. Patient advised that I will send her request to Dr. Atlas Saint and I will call and let her know if she is willing to approve the refill. Pt verbalized understanding of information discussed w/ no further questions at this time.

## 2018-09-25 DIAGNOSIS — E03.9 ACQUIRED HYPOTHYROIDISM: ICD-10-CM

## 2018-09-25 RX ORDER — LEVOTHYROXINE SODIUM 100 UG/1
100 TABLET ORAL
Qty: 90 TAB | Refills: 3 | Status: SHIPPED | OUTPATIENT
Start: 2018-09-25 | End: 2018-10-11 | Stop reason: SDUPTHER

## 2018-09-25 RX ORDER — MIRTAZAPINE 15 MG/1
30 TABLET, FILM COATED ORAL
Qty: 90 TAB | Refills: 3 | Status: SHIPPED | OUTPATIENT
Start: 2018-09-25 | End: 2019-03-13

## 2018-09-25 NOTE — TELEPHONE ENCOUNTER
Requested Prescriptions     Pending Prescriptions Disp Refills    mirtazapine (REMERON) 15 mg tablet 90 Tab 3     Sig: Take 2 Tabs by mouth nightly.  levothyroxine (SYNTHROID) 100 mcg tablet 90 Tab 3     Sig: Take 1 Tab by mouth Daily (before breakfast). PCP: Cesia Reynolds MD    Last appt: 8/17/2018  Future Appointments  Date Time Provider Lakeisha Espinal   10/25/2018 9:30 AM [facility] 1 [facility]AM SHAHRAM[facility]       Requested Prescriptions     Pending Prescriptions Disp Refills    mirtazapine (REMERON) 15 mg tablet 90 Tab 3     Sig: Take 2 Tabs by mouth nightly.  levothyroxine (SYNTHROID) 100 mcg tablet 90 Tab 3     Sig: Take 1 Tab by mouth Daily (before breakfast).

## 2018-10-11 DIAGNOSIS — E03.9 ACQUIRED HYPOTHYROIDISM: ICD-10-CM

## 2018-10-11 RX ORDER — MIRTAZAPINE 30 MG/1
TABLET, FILM COATED ORAL
Refills: 3 | COMMUNITY
Start: 2018-10-03 | End: 2018-10-11 | Stop reason: SDUPTHER

## 2018-10-11 RX ORDER — LEVOTHYROXINE SODIUM 100 UG/1
100 TABLET ORAL
Qty: 90 TAB | Refills: 3 | Status: SHIPPED | OUTPATIENT
Start: 2018-10-11 | End: 2020-06-06

## 2018-10-11 RX ORDER — MIRTAZAPINE 30 MG/1
30 TABLET, FILM COATED ORAL
Qty: 90 TAB | Refills: 3 | Status: SHIPPED | OUTPATIENT
Start: 2018-10-11 | End: 2019-03-13

## 2018-10-11 NOTE — TELEPHONE ENCOUNTER
Requested Prescriptions     Pending Prescriptions Disp Refills    mirtazapine (REMERON) 30 mg tablet 90 Tab 3    levothyroxine (SYNTHROID) 100 mcg tablet 90 Tab 3     Sig: Take 1 Tab by mouth Daily (before breakfast). PCP: Adam Suazo MD    Last appt: 8/17/2018  Future Appointments  Date Time Provider Lakeisha Espinal   10/25/2018 9:30 AM [facility] 1 [facility]       Requested Prescriptions     Pending Prescriptions Disp Refills    mirtazapine (REMERON) 30 mg tablet 90 Tab 3    levothyroxine (SYNTHROID) 100 mcg tablet 90 Tab 3     Sig: Take 1 Tab by mouth Daily (before breakfast).

## 2018-10-25 ENCOUNTER — HOSPITAL ENCOUNTER (OUTPATIENT)
Dept: MAMMOGRAPHY | Age: 68
Discharge: HOME | End: 2018-10-25
Payer: MEDICARE

## 2018-10-25 DIAGNOSIS — Z12.31 ENCOUNTER FOR SCREENING MAMMOGRAM FOR BREAST CANCER: ICD-10-CM

## 2018-10-25 PROCEDURE — 77067 SCR MAMMO BI INCL CAD: CPT

## 2018-11-16 ENCOUNTER — TELEPHONE (OUTPATIENT)
Dept: INTERNAL MEDICINE CLINIC | Age: 68
End: 2018-11-16

## 2018-11-16 ENCOUNTER — APPOINTMENT (OUTPATIENT)
Dept: INTERNAL MEDICINE CLINIC | Age: 68
End: 2018-11-16

## 2018-11-16 ENCOUNTER — HOSPITAL ENCOUNTER (OUTPATIENT)
Dept: LAB | Age: 68
Discharge: HOME | End: 2018-11-16
Payer: MEDICARE

## 2018-11-16 DIAGNOSIS — E03.9 ACQUIRED HYPOTHYROIDISM: Primary | ICD-10-CM

## 2018-11-16 PROCEDURE — 84443 ASSAY THYROID STIM HORMONE: CPT

## 2018-11-16 PROCEDURE — 36415 COLL VENOUS BLD VENIPUNCTURE: CPT

## 2018-11-17 LAB — TSH SERPL DL<=0.005 MIU/L-ACNC: 3.99 UIU/ML (ref 0.45–4.5)

## 2018-11-18 DIAGNOSIS — E03.9 ACQUIRED HYPOTHYROIDISM: Primary | ICD-10-CM

## 2018-11-18 NOTE — TELEPHONE ENCOUNTER
Notify patient normal thyroid test. Continue present dose of medication.  Recheck TSH in 3 months (lab only) to ensure stable, then 6 months for medicare wellness/annual exam.

## 2018-11-19 DIAGNOSIS — F32.A ANXIETY AND DEPRESSION: ICD-10-CM

## 2018-11-19 DIAGNOSIS — F41.9 ANXIETY AND DEPRESSION: ICD-10-CM

## 2018-11-19 RX ORDER — LORAZEPAM 1 MG/1
TABLET ORAL
Qty: 60 TAB | Refills: 1 | OUTPATIENT
Start: 2018-11-19 | End: 2019-04-30 | Stop reason: SDUPTHER

## 2019-03-06 ENCOUNTER — TELEPHONE (OUTPATIENT)
Dept: INTERNAL MEDICINE CLINIC | Age: 69
End: 2019-03-06

## 2019-03-06 NOTE — TELEPHONE ENCOUNTER
Spoke with patient. Two pt identifiers confirmed. Patient offered an appointment with Dr. Isak Wilks on 03/13/19. Patient accepted. Pt verbalized understanding of information discussed w/ no further questions at this time. Pt verbalized understanding of information discussed w/ no further questions at this time.

## 2019-03-06 NOTE — TELEPHONE ENCOUNTER
Patient states she needs a call back to get an acute appt to be seen by Dr. Jerrica Andrade for several medical concerns & also to get labs done to include PSA. Please call to discuss.  Thank you

## 2019-03-12 NOTE — PROGRESS NOTES
Tayo Gunn is a 79 y.o. female who presents with complaints of fatigue and chronic diarrhea. Last seen June 2018. She is being consistent with thyroid medication. TSH at goal Nov 2018 on levothyroxine 100 mcg once a day. Now taking trazodone 150mg at bedtime. Taking one hour to get to sleep. Has tried melatonin 10mg, not effective. Stopped remeron, was not helping the depression. No prior benadryl use. Will sleep 2-8 hours, varies a lot. No pain or RLS. Reading before bedtime. Feels that the depression not controlled. Sees psych NP. Added abilify, yesterday first dose. And sertraline increased 150mg once a day. Not seeing a counselor. Reports recent move to new neighborhood and feels bored. Is trying to get more active, is active with dogs. Sees Gi, Dr. Tim Duckworth. She has chronic diarrhea. Colonoscopy June 2018. Takes kaopectate prn. Taking imodium every 3 hours. Ongoing nausea, prior EGD. Prior zofran 4mg. No prior phenergan.         Past Medical History:   Diagnosis Date    Anemia     Anxiety     Breast cancer ([facility] [address] 2005    right lumpectomy    Chronic pain     neck pain    Cough     Depression     Hemorrhoid     Menopause 2005    Muscle pain     Neck problem     Pneumonia     sepis    S/P radiation therapy 2005    Sleep trouble     Thyroid disease     Urine troubles     unable to controll       Family History   Problem Relation Age of Onset    Cancer Mother        Social History     Socioeconomic History    Marital status:      Spouse name: Not on file    Number of children: Not on file    Years of education: Not on file    Highest education level: Not on file   Social Needs    Financial resource strain: Not on file    Food insecurity - worry: Not on file    Food insecurity - inability: Not on file   [employer] needs - medical: Not on file   [employer] needs - non-medical: Not on file   Occupational History    Not on file   Tobacco Use    Smoking status: Former Smoker     Last attempt to quit: 2010     Years since quittin.2    Smokeless tobacco: Never Used   Substance and Sexual Activity    Alcohol use: Yes     Alcohol/week: 8.4 oz     Types: 14 Shots of liquor per week    Drug use: No    Sexual activity: Yes     Partners: Male     Birth control/protection: None   Other Topics Concern    Not on file   Social History Narrative    Not on file       Current Outpatient Medications on File Prior to Visit   Medication Sig Dispense Refill    ARIPiprazole (ABILIFY) 5 mg tablet       sertraline (ZOLOFT) 100 mg tablet TAKE  1 1/2 TABLET BY MOUTH EVERY DAY  1    LORazepam (ATIVAN) 1 mg tablet TAKE 1 TABLET BY MOUTH TWICE A DAY AS NEEDED 60 Tab 1    levothyroxine (SYNTHROID) 100 mcg tablet Take 1 Tab by mouth Daily (before breakfast). 90 Tab 3    naproxen sodium (ALEVE) 220 mg cap Take  by mouth.  traZODone (DESYREL) 150 mg tablet TAKE 1 TABLET BY MOUTH NIGHTLY. 30 Tab 3    Bacillus coagulans (PROBIOTIC, B. COAGULANS, PO) Take  by mouth.  FLUZONE HIGH-DOSE 2017-18, PF, syrg injection FLU  0    PREVNAR 13, PF, 0.5 mL syrg injection TO BE ADMINISTERED BY THE PHARMACIST  0     No current facility-administered medications on file prior to visit. Review of Systems  Pertinent items are noted in HPI. Objective:     Visit Vitals  /63 (BP 1 Location: Left arm, BP Patient Position: Sitting)   Pulse 69   Temp 97.4 °F (36.3 °C) (Oral)   Resp 18   Ht 5' (1.524 m)   Wt 127 lb (57.6 kg)   SpO2 96%   BMI 24.80 kg/m²     Gen: well appearing female  HEENT:   PERRL,normal conjunctiva. External ear and canals normal, OP no erythema, no exudates, MMM  Neck:  No masses or LAD  Resp:  No wheezing, no rhonchi, no rales. CV:  RRR, normal S1S2, no murmur. GI: soft, nontender, without masses. Extrem:  +2 pulses, no edema, warm distally      Assessment/Plan:       ICD-10-CM ICD-9-CM    1.  Chronic fatigue S86.66 706.42 METABOLIC PANEL, COMPREHENSIVE      CBC W/O DIFF      TSH 3RD GENERATION   2. Chronic diarrhea K52.9 787.91 colestipol (COLESTID) 1 gram tablet   3. Acquired hypothyroidism E03.9 244.9 TSH 3RD GENERATION   4. Anxiety and depression F41.9 300.00     F32.9 311    5. Vitamin D deficiency E55.9 268.9 VITAMIN D, 25 HYDROXY   6. Nausea R11.0 787.02 promethazine (PHENERGAN) 25 mg tablet      ondansetron (ZOFRAN ODT) 4 mg disintegrating tablet   given information on fatigue. Checking baseline labs. Recommend regular exercise and adequate rest    Continue to follow up psych NP regarding depression. Discussed coping skills. Try benadryl (diphenhydramine) 25mg one hour before bed. Trial of colestid 1 gram twice a day for diarrhea. Follow-up Disposition:  Return for follow up pending labs and 6 months.   Gilbert Goss MD

## 2019-03-13 ENCOUNTER — HOSPITAL ENCOUNTER (OUTPATIENT)
Dept: LAB | Age: 69
Discharge: HOME | End: 2019-03-13
Payer: MEDICARE

## 2019-03-13 ENCOUNTER — OFFICE VISIT (OUTPATIENT)
Dept: INTERNAL MEDICINE CLINIC | Age: 69
End: 2019-03-13

## 2019-03-13 VITALS
HEART RATE: 69 BPM | HEIGHT: 60 IN | TEMPERATURE: 97.4 F | RESPIRATION RATE: 18 BRPM | SYSTOLIC BLOOD PRESSURE: 114 MMHG | WEIGHT: 127 LBS | BODY MASS INDEX: 24.94 KG/M2 | DIASTOLIC BLOOD PRESSURE: 63 MMHG | OXYGEN SATURATION: 96 %

## 2019-03-13 DIAGNOSIS — K52.9 CHRONIC DIARRHEA: ICD-10-CM

## 2019-03-13 DIAGNOSIS — E03.9 ACQUIRED HYPOTHYROIDISM: ICD-10-CM

## 2019-03-13 DIAGNOSIS — R53.82 CHRONIC FATIGUE: Primary | ICD-10-CM

## 2019-03-13 DIAGNOSIS — E55.9 VITAMIN D DEFICIENCY: ICD-10-CM

## 2019-03-13 DIAGNOSIS — F32.A ANXIETY AND DEPRESSION: ICD-10-CM

## 2019-03-13 DIAGNOSIS — F41.9 ANXIETY AND DEPRESSION: ICD-10-CM

## 2019-03-13 DIAGNOSIS — R11.0 NAUSEA: ICD-10-CM

## 2019-03-13 PROCEDURE — 36415 COLL VENOUS BLD VENIPUNCTURE: CPT

## 2019-03-13 PROCEDURE — 85027 COMPLETE CBC AUTOMATED: CPT

## 2019-03-13 PROCEDURE — 84443 ASSAY THYROID STIM HORMONE: CPT

## 2019-03-13 PROCEDURE — 82306 VITAMIN D 25 HYDROXY: CPT

## 2019-03-13 PROCEDURE — 80053 COMPREHEN METABOLIC PANEL: CPT

## 2019-03-13 RX ORDER — SERTRALINE HYDROCHLORIDE 100 MG/1
TABLET, FILM COATED ORAL
Refills: 1 | COMMUNITY
Start: 2019-03-01 | End: 2020-06-16

## 2019-03-13 RX ORDER — MONTELUKAST SODIUM 4 MG/1
1 TABLET, CHEWABLE ORAL 2 TIMES DAILY
Qty: 60 TAB | Refills: 3 | Status: SHIPPED | OUTPATIENT
Start: 2019-03-13 | End: 2021-02-03

## 2019-03-13 RX ORDER — PROMETHAZINE HYDROCHLORIDE 25 MG/1
TABLET ORAL
Qty: 30 TAB | Refills: 2 | Status: SHIPPED | OUTPATIENT
Start: 2019-03-13 | End: 2021-02-03 | Stop reason: ALTCHOICE

## 2019-03-13 RX ORDER — ONDANSETRON 4 MG/1
4 TABLET, ORALLY DISINTEGRATING ORAL
Qty: 30 TAB | Refills: 2 | Status: SHIPPED | OUTPATIENT
Start: 2019-03-13 | End: 2021-02-03 | Stop reason: ALTCHOICE

## 2019-03-13 RX ORDER — ARIPIPRAZOLE 5 MG/1
TABLET ORAL
COMMUNITY
Start: 2019-03-12 | End: 2020-06-16

## 2019-03-13 NOTE — PATIENT INSTRUCTIONS
Try benadryl (diphenhydramine) 25mg one hour before bed. Trial of colestid 1 gram twice a day for diarrhea. Myles Jurado, hypotherapist.      Noe Costello, [employer].

## 2019-03-14 LAB
25(OH)D3+25(OH)D2 SERPL-MCNC: 30.4 NG/ML (ref 30–100)
ALBUMIN SERPL-MCNC: 4.1 G/DL (ref 3.6–4.8)
ALBUMIN/GLOB SERPL: 1.3 {RATIO} (ref 1.2–2.2)
ALP SERPL-CCNC: 44 IU/L (ref 39–117)
ALT SERPL-CCNC: 7 IU/L (ref 0–32)
AST SERPL-CCNC: 10 IU/L (ref 0–40)
BILIRUB SERPL-MCNC: 0.4 MG/DL (ref 0–1.2)
BUN SERPL-MCNC: 16 MG/DL (ref 8–27)
BUN/CREAT SERPL: 19 (ref 12–28)
CALCIUM SERPL-MCNC: 9.5 MG/DL (ref 8.7–10.3)
CHLORIDE SERPL-SCNC: 100 MMOL/L (ref 96–106)
CO2 SERPL-SCNC: 27 MMOL/L (ref 20–29)
CREAT SERPL-MCNC: 0.86 MG/DL (ref 0.57–1)
ERYTHROCYTE [DISTWIDTH] IN BLOOD BY AUTOMATED COUNT: 13.5 % (ref 12.3–15.4)
GLOBULIN SER CALC-MCNC: 3.1 G/DL (ref 1.5–4.5)
GLUCOSE SERPL-MCNC: 90 MG/DL (ref 65–99)
HCT VFR BLD AUTO: 37.6 % (ref 34–46.6)
HGB BLD-MCNC: 12.4 G/DL (ref 11.1–15.9)
MCH RBC QN AUTO: 33.2 PG (ref 26.6–33)
MCHC RBC AUTO-ENTMCNC: 33 G/DL (ref 31.5–35.7)
MCV RBC AUTO: 101 FL (ref 79–97)
PLATELET # BLD AUTO: 343 X10E3/UL (ref 150–379)
POTASSIUM SERPL-SCNC: 4.3 MMOL/L (ref 3.5–5.2)
PROT SERPL-MCNC: 7.2 G/DL (ref 6–8.5)
RBC # BLD AUTO: 3.73 X10E6/UL (ref 3.77–5.28)
SODIUM SERPL-SCNC: 140 MMOL/L (ref 134–144)
TSH SERPL DL<=0.005 MIU/L-ACNC: 3.51 UIU/ML (ref 0.45–4.5)
WBC # BLD AUTO: 7 X10E3/UL (ref 3.4–10.8)

## 2019-04-30 DIAGNOSIS — F32.A ANXIETY AND DEPRESSION: ICD-10-CM

## 2019-04-30 DIAGNOSIS — F41.9 ANXIETY AND DEPRESSION: ICD-10-CM

## 2019-04-30 RX ORDER — LORAZEPAM 1 MG/1
TABLET ORAL
Qty: 60 TAB | Refills: 1 | OUTPATIENT
Start: 2019-04-30 | End: 2019-05-01 | Stop reason: SDUPTHER

## 2019-05-01 DIAGNOSIS — F41.9 ANXIETY AND DEPRESSION: ICD-10-CM

## 2019-05-01 DIAGNOSIS — F32.A ANXIETY AND DEPRESSION: ICD-10-CM

## 2019-05-01 RX ORDER — LORAZEPAM 1 MG/1
TABLET ORAL
Qty: 60 TAB | Refills: 1 | OUTPATIENT
Start: 2019-05-01

## 2019-05-01 NOTE — TELEPHONE ENCOUNTER
Approved Medications      LORazepam (ATIVAN) 1 mg tablet         Sig: TAKE 1 TABLET BY MOUTH TWICE A DAY AS NEEDED    Disp:  61 Tab    Refills:  1    Start: 4/30/2019    Class: Phone In    Authorized by: Brando Becerril MD    For: Anxiety and depression    To pharmacy: Not to exceed 4 additional fills before 05/19/2019. Requested on: 11/20/2018             Phoned in Ativan to [facility] 3598 per  request. Goldie Hua with Ernie Vincent who gave VORB.

## 2019-05-02 NOTE — TELEPHONE ENCOUNTER
Approved Medications      LORazepam (ATIVAN) 1 mg tablet         Sig: TAKE 1 TABLET BY MOUTH TWICE A DAY AS NEEDED    Disp:  60 Tab    Refills:  1    Start: 5/1/2019    Class: Phone In    Authorized by: Alyse Clark MD    For: Anxiety and depression    To pharmacy: Not to exceed 4 additional fills before 05/19/2019    Requested on: 11/20/2018        To be filled at: [facility]/pharmacy #[id number]- [place], [address]        Prescription has been phoned into [facility] pharmacy

## 2019-05-28 ENCOUNTER — TELEPHONE (OUTPATIENT)
Dept: INTERNAL MEDICINE CLINIC | Age: 69
End: 2019-05-28

## 2019-05-28 NOTE — TELEPHONE ENCOUNTER
----- Message from Gerlean Holstein sent at 5/28/2019  3:03 PM EDT -----  Regarding: Dr. Vonda Chatterjee: [phone]  Pt was given an EKG order by a nurse practitioner outside of this practice but the NP did request that she went through her PCP to get it set up and scheduled.

## 2019-05-29 NOTE — TELEPHONE ENCOUNTER
Spoke with patient. Two pt identifiers confirmed. Patient states that she needs to come in for a nurse visit to have an EKG for consideration for ECT. Patient offered an appointment on 05/30/19 at 8:00 am.  Patient accepted appointment. Patient advised if anything changes or if unable to keep this appointment to call the office  Pt verbalized understanding of information discussed w/ no further questions at this time.

## 2019-05-30 ENCOUNTER — DOCUMENTATION ONLY (OUTPATIENT)
Dept: INTERNAL MEDICINE CLINIC | Age: 69
End: 2019-05-30

## 2019-05-30 ENCOUNTER — CLINICAL SUPPORT (OUTPATIENT)
Dept: INTERNAL MEDICINE CLINIC | Age: 69
End: 2019-05-30

## 2019-05-30 VITALS
SYSTOLIC BLOOD PRESSURE: 128 MMHG | RESPIRATION RATE: 16 BRPM | BODY MASS INDEX: 24.94 KG/M2 | HEART RATE: 82 BPM | HEIGHT: 60 IN | TEMPERATURE: 98 F | DIASTOLIC BLOOD PRESSURE: 71 MMHG | OXYGEN SATURATION: 98 % | WEIGHT: 127 LBS

## 2019-05-30 DIAGNOSIS — Z01.818 PRE-OP EXAM: Primary | ICD-10-CM

## 2019-05-30 NOTE — PROGRESS NOTES
Vincent Jon NP at [facility] [phone]      Pt is for a EKG for a possible procedure she is considering. NP with [facility] request to have EKG faxed over once completed.

## 2019-07-01 RX ORDER — TRAZODONE HYDROCHLORIDE 150 MG/1
150 TABLET ORAL
Qty: 30 TAB | Refills: 3 | Status: SHIPPED | OUTPATIENT
Start: 2019-07-01 | End: 2019-09-25 | Stop reason: SDUPTHER

## 2019-07-03 ENCOUNTER — OFFICE VISIT (OUTPATIENT)
Dept: DERMATOLOGY | Facility: AMBULATORY SURGERY CENTER | Age: 69
End: 2019-07-03

## 2019-07-03 VITALS
OXYGEN SATURATION: 95 % | BODY MASS INDEX: 24.94 KG/M2 | HEIGHT: 60 IN | HEART RATE: 76 BPM | DIASTOLIC BLOOD PRESSURE: 58 MMHG | SYSTOLIC BLOOD PRESSURE: 112 MMHG | WEIGHT: 127 LBS | TEMPERATURE: 98.3 F

## 2019-07-03 DIAGNOSIS — Z80.8 FAMILY HISTORY OF NONMELANOMA SKIN CANCER: ICD-10-CM

## 2019-07-03 DIAGNOSIS — D22.9 MULTIPLE BENIGN NEVI: ICD-10-CM

## 2019-07-03 DIAGNOSIS — D18.01 CHERRY ANGIOMA: ICD-10-CM

## 2019-07-03 DIAGNOSIS — Z80.8 FAMILY HISTORY OF MELANOMA: Primary | ICD-10-CM

## 2019-07-03 DIAGNOSIS — L82.1 SEBORRHEIC KERATOSES: ICD-10-CM

## 2019-07-03 RX ORDER — METHYLPHENIDATE HYDROCHLORIDE 10 MG/1
TABLET ORAL
Refills: 0 | COMMUNITY
Start: 2019-06-04 | End: 2019-10-10

## 2019-07-03 NOTE — PROGRESS NOTES
Written by Severa Black, as dictated by Kenisha Read [address], [address]. Name: Nidia Zazueta       Age: 79 y.o. Date: 7/3/2019    Chief Complaint:   Chief Complaint   Patient presents with    Skin Exam     full body/areas on back       Subjective:    HPI  Ms. Nidia Zazueta is a 79 y.o. female who presents for a full skin exam.  The patient's last skin exam was on 17 and the patient does have current complaints related to her skin. She reports her  has concerns about a few spots on her back. She does not notice any associated symptoms. She reports that she hasn't noticed any lesion recurrence from the prior removals at last visit. She notes having GI problems and taking imodium, which she thinks hurts her immune system. She is also preparing to start ECT therapy. She is feeling in her usual state of health today. She has no current illnesses, no other skin concerns. Her allergies, medications, medical, and social history are reviewed by me today. The patient's pertinent skin history includes : Family history of melanoma in her mother (who is  from 1032) and NMSC in her sister    ROS: Constitutional: Negative. Dermatological : negative      Social History     Socioeconomic History    Marital status:      Spouse name: Not on file    Number of children: Not on file    Years of education: Not on file    Highest education level: Not on file   Occupational History    Not on file   Social Needs    Financial resource strain: Not on file    Food insecurity:     Worry: Not on file     Inability: Not on file    Transportation needs:     Medical: Not on file     Non-medical: Not on file   Tobacco Use    Smoking status: Former Smoker     Last attempt to quit: 2010     Years since quittin.5    Smokeless tobacco: Never Used   Substance and Sexual Activity    Alcohol use:  Yes     Alcohol/week: 8.4 oz     Types: 14 Shots of liquor per week    Drug use: No    Sexual activity: Yes     Partners: Male     Birth control/protection: None   Lifestyle    Physical activity:     Days per week: Not on file     Minutes per session: Not on file    Stress: Not on file   Relationships    Social connections:     Talks on phone: Not on file     Gets together: Not on file     Attends Judaism service: Not on file     Active member of club or organization: Not on file     Attends meetings of clubs or organizations: Not on file     Relationship status: Not on file    Intimate partner violence:     Fear of current or ex partner: Not on file     Emotionally abused: Not on file     Physically abused: Not on file     Forced sexual activity: Not on file   Other Topics Concern    Not on file   Social History Narrative    Not on file       Family History   Problem Relation Age of Onset    Cancer Mother        Past Medical History:   Diagnosis Date    Anemia     Anxiety     Breast cancer ([facility] [address] 2005    right lumpectomy    Chronic pain     neck pain    Cough     Depression     Hemorrhoid     Menopause 2005    Muscle pain     Neck problem     Pneumonia     sepis    S/P radiation therapy 2005    Sleep trouble     Thyroid disease     Urine troubles     unable to controll       Past Surgical History:   Procedure Laterality Date    HX BREAST LUMPECTOMY Right     HX  SECTION      HX THYROIDECTOMY      IMPLANT BREAST SILICONE/EQ Bilateral 8408       Current Outpatient Medications   Medication Sig Dispense Refill    methylphenidate HCl (RITALIN) 10 mg tablet TAKE 1 TABLET BY MOUTH TWICE A DAY  0    traZODone (DESYREL) 150 mg tablet Take 1 Tab by mouth nightly. 30 Tab 3    colestipol (COLESTID) 1 gram tablet Take 1 Tab by mouth two (2) times a day. 60 Tab 3    levothyroxine (SYNTHROID) 100 mcg tablet Take 1 Tab by mouth Daily (before breakfast).  90 Tab 3    LORazepam (ATIVAN) 1 mg tablet TAKE 1 TABLET BY MOUTH TWICE A DAY AS NEEDED 60 Tab 1    ARIPiprazole (ABILIFY) 5 mg tablet       sertraline (ZOLOFT) 100 mg tablet TAKE  1 1/2 TABLET BY MOUTH EVERY DAY  1    promethazine (PHENERGAN) 25 mg tablet Take 1/2-1 tablet every 6 hours as needed for nausea. 30 Tab 2    ondansetron (ZOFRAN ODT) 4 mg disintegrating tablet Take 1 Tab by mouth every eight (8) hours as needed for Nausea. 30 Tab 2    Bacillus coagulans (PROBIOTIC, B. COAGULANS, PO) Take  by mouth.  FLUZONE HIGH-DOSE 2017-18, PF, syrg injection FLU  0    PREVNAR 13, PF, 0.5 mL syrg injection TO BE ADMINISTERED BY THE PHARMACIST  0    naproxen sodium (ALEVE) 220 mg cap Take  by mouth. No Known Allergies      Objective:    Visit Vitals  /58 (BP 1 Location: Left arm, BP Patient Position: Sitting)   Pulse 76   Temp 98.3 °F (36.8 °C) (Oral)   Ht 5' (1.524 m)   Wt 127 lb (57.6 kg)   SpO2 95%   BMI 24.80 kg/m²       Rina Lopez is a 79 y.o. female who appears well and in no distress. She is awake, alert, and oriented. A skin examination was performed including her scalp, face (including eyelids), ears, neck, chest, back, abdomen, upper extremities (including digits/nails), lower extremities, breasts, buttocks; genital skin was not examined. There are scattered waxy macules and keratotic papules consistent with seborrheic keratoses, including the lesion of concern on her back. She has scattered red papules consistent with cherry angiomas. There are pink intradermal nevi and brown junctional nevi, no concerning features for severe atypia. She has a 4 x 3 mm pink papule on the right nasal sidewall, no specific features for BCC identified dermatoscopically. Photos from today's visit:     Right nasal sidewall      Assessment/Plan:  1. Family history of melanoma and nonmelanoma skin cancer. I discussed sun protection, sunscreen use, the warning signs of skin cancer, the need for self-skin examinations, and the need for regular practitioner exams every 6 months.   The patient should follow up sooner as needed if new, changing, or symptomatic skin lesions arise. 2. Seborrheic keratoses. The diagnosis was reviewed and the patient was reassured that no treatment is needed for these benign lesions. 3. Cherry angiomas. The diagnosis was reviewed and the patient was reassured that no treatment is needed for these benign lesions. 4. Normal nevi. The diagnosis of normal nevi was reviewed. I discussed sun protection, sunscreen use, the warning signs of skin cancer, the need for self-skin examinations, and the need for regular practitioner exams every 6 months. The patient should follow up sooner as needed if new, changing, or symptomatic skin lesions arise. 5. Neoplasm of Uncertain Behavior, right nasal sidewall. The differential diagnoses were discussed. Will monitor and re-check at next visit in 6 months. She was instructed that if this is growing, changing or shows any symptoms such as bleeding she should make an appt sooner. This plan was reviewed with the patient and patient agrees. All questions were answered. This scribe documentation was reviewed by me and accurately reflects the examination and decisions made by me.

## 2019-09-03 ENCOUNTER — PATIENT MESSAGE (OUTPATIENT)
Dept: INTERNAL MEDICINE CLINIC | Age: 69
End: 2019-09-03

## 2019-09-03 DIAGNOSIS — E03.9 ACQUIRED HYPOTHYROIDISM: Primary | ICD-10-CM

## 2019-09-06 ENCOUNTER — HOSPITAL ENCOUNTER (OUTPATIENT)
Dept: LAB | Age: 69
Discharge: HOME | End: 2019-09-06
Payer: MEDICARE

## 2019-09-06 DIAGNOSIS — E03.9 ACQUIRED HYPOTHYROIDISM: ICD-10-CM

## 2019-09-06 PROCEDURE — 84443 ASSAY THYROID STIM HORMONE: CPT

## 2019-09-06 PROCEDURE — 36415 COLL VENOUS BLD VENIPUNCTURE: CPT

## 2019-09-07 LAB — TSH SERPL DL<=0.005 MIU/L-ACNC: 0.71 UIU/ML (ref 0.45–4.5)

## 2019-09-26 RX ORDER — TRAZODONE HYDROCHLORIDE 150 MG/1
TABLET ORAL
Qty: 90 TAB | Refills: 3 | Status: SHIPPED | OUTPATIENT
Start: 2019-09-26 | End: 2020-06-16

## 2019-10-09 DIAGNOSIS — E03.9 ACQUIRED HYPOTHYROIDISM: ICD-10-CM

## 2019-10-09 RX ORDER — LEVOTHYROXINE SODIUM 100 UG/1
TABLET ORAL
Qty: 90 TAB | Refills: 3 | Status: SHIPPED | OUTPATIENT
Start: 2019-10-09 | End: 2020-01-08

## 2019-10-10 ENCOUNTER — OFFICE VISIT (OUTPATIENT)
Dept: INTERNAL MEDICINE CLINIC | Age: 69
End: 2019-10-10

## 2019-10-10 VITALS
SYSTOLIC BLOOD PRESSURE: 126 MMHG | WEIGHT: 120 LBS | DIASTOLIC BLOOD PRESSURE: 71 MMHG | HEART RATE: 86 BPM | RESPIRATION RATE: 16 BRPM | TEMPERATURE: 97.6 F | HEIGHT: 63 IN | OXYGEN SATURATION: 100 % | BODY MASS INDEX: 21.26 KG/M2

## 2019-10-10 DIAGNOSIS — F32.A ANXIETY AND DEPRESSION: ICD-10-CM

## 2019-10-10 DIAGNOSIS — Z00.00 MEDICARE ANNUAL WELLNESS VISIT, SUBSEQUENT: ICD-10-CM

## 2019-10-10 DIAGNOSIS — F41.9 ANXIETY AND DEPRESSION: ICD-10-CM

## 2019-10-10 DIAGNOSIS — Z23 ENCOUNTER FOR IMMUNIZATION: ICD-10-CM

## 2019-10-10 DIAGNOSIS — Z78.0 POSTMENOPAUSAL: ICD-10-CM

## 2019-10-10 DIAGNOSIS — R05.9 COUGH: Primary | ICD-10-CM

## 2019-10-10 DIAGNOSIS — R05.9 COUGH: ICD-10-CM

## 2019-10-10 DIAGNOSIS — M54.12 CERVICAL RADICULOPATHY: ICD-10-CM

## 2019-10-10 DIAGNOSIS — E03.9 ACQUIRED HYPOTHYROIDISM: Primary | ICD-10-CM

## 2019-10-10 DIAGNOSIS — M85.80 OSTEOPENIA, UNSPECIFIED LOCATION: ICD-10-CM

## 2019-10-10 RX ORDER — VILAZODONE HYDROCHLORIDE 40 MG/1
TABLET ORAL
Refills: 1 | COMMUNITY
Start: 2019-09-26 | End: 2020-06-16

## 2019-10-10 RX ORDER — DEXTROAMPHETAMINE SACCHARATE, AMPHETAMINE ASPARTATE, DEXTROAMPHETAMINE SULFATE AND AMPHETAMINE SULFATE 5; 5; 5; 5 MG/1; MG/1; MG/1; MG/1
TABLET ORAL
Refills: 0 | COMMUNITY
Start: 2019-08-21 | End: 2019-10-10

## 2019-10-10 RX ORDER — DEXTROAMPHETAMINE SULFATE 10 MG/1
10 TABLET ORAL 3 TIMES DAILY
Refills: 0 | COMMUNITY
Start: 2019-09-28

## 2019-10-10 RX ORDER — CLONAZEPAM 0.5 MG/1
TABLET ORAL
COMMUNITY
End: 2019-10-22 | Stop reason: SDUPTHER

## 2019-10-10 NOTE — PATIENT INSTRUCTIONS
Medicare Wellness Visit, Female The best way to live healthy is to have a lifestyle where you eat a well-balanced diet, exercise regularly, limit alcohol use, and quit all forms of tobacco/nicotine, if applicable. Regular preventive services are another way to keep healthy. Preventive services (vaccines, screening tests, monitoring & exams) can help personalize your care plan, which helps you manage your own care. Screening tests can find health problems at the earliest stages, when they are easiest to treat. [facility] follows the current, evidence-based guidelines published by the [facility] ([facility]) when recommending preventive services for our patients. Because we follow these guidelines, sometimes recommendations change over time as research supports it. (For example, mammograms used to be recommended annually. Even though Medicare will still pay for an annual mammogram, the newer guidelines recommend a mammogram every two years for women of average risk.) Of course, you and your doctor may decide to screen more often for some diseases, based on your risk and your health status. Preventive services for you include: - Medicare offers their members a free annual wellness visit, which is time for you and your primary care provider to discuss and plan for your preventive service needs. Take advantage of this benefit every year! 
-All adults over the age of 72 should receive the recommended pneumonia vaccines. Current USPSTF guidelines recommend a series of two vaccines for the best pneumonia protection.  
-All adults should have a flu vaccine yearly and a tetanus vaccine every 10 years. All adults age 61 and older should receive a shingles vaccine once in their lifetime.   
-A bone mass density test is recommended when a woman turns 65 to screen for osteoporosis. This test is only recommended one time, as a screening. Some providers will use this same test as a disease monitoring tool if you already have osteoporosis. -All adults age 38-68 who are overweight should have a diabetes screening test once every three years.  
-Other screening tests and preventive services for persons with diabetes include: an eye exam to screen for diabetic retinopathy, a kidney function test, a foot exam, and stricter control over your cholesterol.  
-Cardiovascular screening for adults with routine risk involves an electrocardiogram (ECG) at intervals determined by your doctor.  
-Colorectal cancer screenings should be done for adults age 54-65 with no increased risk factors for colorectal cancer. There are a number of acceptable methods of screening for this type of cancer. Each test has its own benefits and drawbacks. Discuss with your doctor what is most appropriate for you during your annual wellness visit. The different tests include: colonoscopy (considered the best screening method), a fecal occult blood test, a fecal DNA test, and sigmoidoscopy. -Breast cancer screenings are recommended every other year for women of normal risk, age 54-69. 
-Cervical cancer screenings for women over age 72 are only recommended with certain risk factors.  
-All adults born between [facility] should be screened once for Hepatitis C. Here is a list of your current Health Maintenance items (your personalized list of preventive services) with a due date: 
Health Maintenance Due Topic Date Due  
 DTaP/Tdap/Td  (1 - Tdap) 09/24/1973  Shingles Vaccine (1 of 2) 09/24/2002  Glaucoma Screening   09/24/2017  Pneumococcal Vaccine (2 of 2 - PPSV23) 10/01/2018 [address] [place] [address] Annual Well Visit  06/15/2019  Flu Vaccine  08/01/2019 Office Policies Phone calls/patient messages: Please allow up to 24 hours for someone in the office to contact you about your call or message.  Be mindful your provider may be out of the office or your message may require further review. We encourage you to use [employer] for your messages as this is a faster, more efficient way to communicate with our office Medication Refills: 
         
Prescription medications require 48-72 business hours to process. We encourage you to use [employer] for your refills. For controlled medications: Please allow 72 business hours to process. Certain medications may require you to  a written prescription at our office. NO narcotic/controlled medications will be prescribed after 4pm Monday through Friday or on weekends Form/Paperwork Completion: 
         
Please note a $25 fee may incur for all paperwork for completed by our providers. We ask that you allow 7-10 business days. Pre-payment is due prior to picking up/faxing the completed form. You may also download your forms to [employer] to have your doctor print off.

## 2019-10-10 NOTE — PROGRESS NOTES
Clara Martinez is a 81 y.o. female who presents for follow up. Seen in March. Right ankle sprain recently. 10 days ago. Getting better. Some allergy symptoms for one week. Took antihistamine yesterday, helpful. No discolored mucous. No facial pain, no chest symptoms. no fever. Normal labs in March 2019. Weight loss 7# since March. Eating less. Lower appetite. Using ensure plus every day. She was started taking dextroamphetamine 10mg BID, per psychiatry NP. Reports lost to 110# in August, regained to 120# since then. Sees psych regularly for chronic depression, reports compliance with medications. Prior ECT. Able to start seeing counselor. Diarrhea has subsided.  BM are normal. No abdominal pain. Denies nausea or GERD. She is being consistent with thyroid medication. TSH at goal Sept 2019. on levothyroxine 100 mcg once a day.      Reports was a smoker, quit 5 years ago. Reports a cough at times. Is concerned about weight loss. Mammogram scheduled. Prior colonoscopy. Cervical DDD with left arm sx.         Past Medical History:   Diagnosis Date    Anemia     Anxiety     Breast cancer ([facility] [address] 2005    right lumpectomy    Chronic pain     neck pain    Cough     Depression     Hemorrhoid     Menopause 2005    Muscle pain     Neck problem     Pneumonia     sepis    S/P radiation therapy 2005    Sleep trouble     Thyroid disease     Urine troubles     unable to controll       Family History   Problem Relation Age of Onset    Cancer Mother        Social History     Socioeconomic History    Marital status:      Spouse name: Not on file    Number of children: Not on file    Years of education: Not on file    Highest education level: Not on file   Occupational History    Not on file   Social Needs    Financial resource strain: Not on file    Food insecurity:     Worry: Not on file     Inability: Not on file    Transportation needs:     Medical: Not on file Non-medical: Not on file   Tobacco Use    Smoking status: Former Smoker     Last attempt to quit: 2010     Years since quittin.7    Smokeless tobacco: Never Used   Substance and Sexual Activity    Alcohol use: Yes     Alcohol/week: 14.0 standard drinks     Types: 14 Shots of liquor per week    Drug use: No    Sexual activity: Yes     Partners: Male     Birth control/protection: None   Lifestyle    Physical activity:     Days per week: Not on file     Minutes per session: Not on file    Stress: Not on file   Relationships    Social connections:     Talks on phone: Not on file     Gets together: Not on file     Attends Mandaen service: Not on file     Active member of club or organization: Not on file     Attends meetings of clubs or organizations: Not on file     Relationship status: Not on file    Intimate partner violence:     Fear of current or ex partner: Not on file     Emotionally abused: Not on file     Physically abused: Not on file     Forced sexual activity: Not on file   Other Topics Concern    Not on file   Social History Narrative    Not on file       Current Outpatient Medications on File Prior to Visit   Medication Sig Dispense Refill    dextroamphetamine (DEXTROSTAT) 10 mg tab TAKE 1 TABLET BY MOUTH TWICE A DAY  0    VIIBRYD 40 mg tab tablet TAKE 1 TABLET BY MOUTH EVERY DAY  1    clonazePAM (KLONOPIN) 0.5 mg tablet clonazepam 0.5 mg tablet      levothyroxine (SYNTHROID) 100 mcg tablet TAKE 1 TABLET BY MOUTH EVERY DAY BEFORE BREAKFAST 90 Tab 3    traZODone (DESYREL) 150 mg tablet TAKE 1 TABLET EVERY NIGHT 90 Tab 3    levothyroxine (SYNTHROID) 100 mcg tablet Take 1 Tab by mouth Daily (before breakfast). 90 Tab 3    naproxen sodium (ALEVE) 220 mg cap Take  by mouth.       LORazepam (ATIVAN) 1 mg tablet TAKE 1 TABLET BY MOUTH TWICE A DAY AS NEEDED 60 Tab 1    ARIPiprazole (ABILIFY) 5 mg tablet       sertraline (ZOLOFT) 100 mg tablet TAKE  1 1/2 TABLET BY MOUTH EVERY DAY  1  promethazine (PHENERGAN) 25 mg tablet Take 1/2-1 tablet every 6 hours as needed for nausea. 30 Tab 2    ondansetron (ZOFRAN ODT) 4 mg disintegrating tablet Take 1 Tab by mouth every eight (8) hours as needed for Nausea. 30 Tab 2    colestipol (COLESTID) 1 gram tablet Take 1 Tab by mouth two (2) times a day. 60 Tab 3    Bacillus coagulans (PROBIOTIC, B. COAGULANS, PO) Take  by mouth.  FLUZONE HIGH-DOSE 2017-18, PF, syrg injection FLU  0    PREVNAR 13, PF, 0.5 mL syrg injection TO BE ADMINISTERED BY THE PHARMACIST  0     No current facility-administered medications on file prior to visit. Review of Systems  Pertinent items are noted in HPI. Objective:     Visit Vitals  /71 (BP 1 Location: Left arm, BP Patient Position: Sitting)   Pulse 86   Temp 97.6 °F (36.4 °C) (Oral)   Resp 16   Ht 5' 2.5\" (1.588 m)   Wt 120 lb (54.4 kg)   SpO2 100%   BMI 21.60 kg/m²     Gen: well appearing female  HEENT:   PERRL,normal conjunctiva. External ear and canals normal, TMs no opacification or erythema,  OP no erythema, no exudates, MMM  Neck:  Supple. Thyroid normal size, nontender, without nodules. No masses or LAD  Resp:  No wheezing, no rhonchi, no rales. CV:  RRR, normal S1S2, no murmur. GI: soft, nontender, without masses. No hepatosplenomegaly. Extrem:  +2 pulses, no edema, warm distally      Assessment/Plan:       ICD-10-CM ICD-9-CM    1. Acquired hypothyroidism E03.9 244.9    2. Medicare annual wellness visit, subsequent Z00.00 V70.0    3. Encounter for immunization Z23 V03.89 INFLUENZA VIRUS VACCINE, HIGH DOSE SEASONAL, PRESERVATIVE FREE      ADMIN INFLUENZA VIRUS VAC      diph,Pertuss,Acell,,Tet Vac-PF (ADACEL) 2 Lf-(2.5-5-3-5 mcg)-5Lf/0.5 mL susp      varicella-zoster recombinant, PF, (SHINGRIX) 50 mcg/0.5 mL susr injection      PNEUMOCOCCAL POLYSACCHARIDE VACCINE, 23-VALENT, ADULT OR IMMUNOSUPPRESSED PT DOSE,   4. Anxiety and depression F41.9 300.00     F32.9 311    5.  Cough R05 786.2 XR CHEST PA LAT   6. Cervical radiculopathy M54.12 723.4 XR SPINE CERV 4 OR 5 V      REFERRAL TO NEUROSURGERY      MRI CERV SPINE WO CONT   7. Postmenopausal Z78.0 V49.81 DEXA BONE DENSITY STUDY AXIAL   8. Osteopenia, unspecified location M85.80 733.90 DEXA BONE DENSITY STUDY AXIAL       Follow-up and Dispositions    · Return for follow up on medications.  Elin Moreno MD

## 2019-10-22 ENCOUNTER — OFFICE VISIT (OUTPATIENT)
Dept: DERMATOLOGY | Facility: AMBULATORY SURGERY CENTER | Age: 69
End: 2019-10-22

## 2019-10-22 ENCOUNTER — HOSPITAL ENCOUNTER (OUTPATIENT)
Dept: LAB | Age: 69
Discharge: HOME | End: 2019-10-22

## 2019-10-22 VITALS
DIASTOLIC BLOOD PRESSURE: 80 MMHG | TEMPERATURE: 97.9 F | OXYGEN SATURATION: 96 % | RESPIRATION RATE: 18 BRPM | HEART RATE: 81 BPM | HEIGHT: 63 IN | BODY MASS INDEX: 21.6 KG/M2 | SYSTOLIC BLOOD PRESSURE: 138 MMHG

## 2019-10-22 DIAGNOSIS — D18.01 CHERRY ANGIOMA: ICD-10-CM

## 2019-10-22 DIAGNOSIS — D22.9 MULTIPLE BENIGN NEVI: ICD-10-CM

## 2019-10-22 DIAGNOSIS — L82.1 SEBORRHEIC KERATOSES: ICD-10-CM

## 2019-10-22 DIAGNOSIS — Z80.8 FAMILY HISTORY OF NONMELANOMA SKIN CANCER: ICD-10-CM

## 2019-10-22 DIAGNOSIS — D48.5 NEOPLASM OF UNCERTAIN BEHAVIOR OF SKIN OF NOSE: ICD-10-CM

## 2019-10-22 DIAGNOSIS — Z80.8 FAMILY HISTORY OF MELANOMA: Primary | ICD-10-CM

## 2019-10-22 DIAGNOSIS — L57.0 ACTINIC KERATOSES: ICD-10-CM

## 2019-10-22 RX ORDER — PERMETHRIN 50 MG/G
CREAM TOPICAL
COMMUNITY
End: 2021-02-03

## 2019-10-22 RX ORDER — CLONAZEPAM 0.5 MG/1
TABLET ORAL
Refills: 0 | COMMUNITY
Start: 2019-09-17 | End: 2020-06-16

## 2019-10-22 NOTE — PROGRESS NOTES
Written by Robert Jaimes, as dictated by Joaquina Ponce, [address]. Name: Ninoska Pedroza       Age: 81 y.o. Date: 10/22/2019    Chief Complaint:   Chief Complaint   Patient presents with    Skin Exam     NP, full skin exam.        Subjective:    HPI  Ms. Ninoska Pedroza is a 81 y.o. female who presents for a full skin exam.  The patient's last skin exam was on 7/3/19 and the patient does have current complaints related to her skin. The patient is concerned about a lesion on the right nasal sidewall that has been persistent for the past couple of months. She says that she is concerned about the lesion due to its pink pigment. She is feeling well and in her usual state of health today. She has no current illnesses, no other skin concerns. Her allergies, medications, medical, and social history are reviewed by me today. The patient's pertinent skin history includes : Family history of melanoma in her mother (who is  from melanoma) and NMSC and AKs in her sister    ROS: Constitutional: Negative. Dermatological : positive for - skin lesion changes    Social History     Socioeconomic History    Marital status:      Spouse name: Not on file    Number of children: Not on file    Years of education: Not on file    Highest education level: Not on file   Occupational History    Not on file   Social Needs    Financial resource strain: Not on file    Food insecurity:     Worry: Not on file     Inability: Not on file    Transportation needs:     Medical: Not on file     Non-medical: Not on file   Tobacco Use    Smoking status: Former Smoker     Last attempt to quit: 2010     Years since quittin.8    Smokeless tobacco: Never Used   Substance and Sexual Activity    Alcohol use:  Yes     Alcohol/week: 14.0 standard drinks     Types: 14 Shots of liquor per week    Drug use: No    Sexual activity: Yes     Partners: Male     Birth control/protection: None   Lifestyle    Physical activity:     Days per week: Not on file     Minutes per session: Not on file    Stress: Not on file   Relationships    Social connections:     Talks on phone: Not on file     Gets together: Not on file     Attends Gnosticist service: Not on file     Active member of club or organization: Not on file     Attends meetings of clubs or organizations: Not on file     Relationship status: Not on file    Intimate partner violence:     Fear of current or ex partner: Not on file     Emotionally abused: Not on file     Physically abused: Not on file     Forced sexual activity: Not on file   Other Topics Concern    Not on file   Social History Narrative    Not on file       Family History   Problem Relation Age of Onset    Cancer Mother        Past Medical History:   Diagnosis Date    Anemia     Anxiety     Breast cancer ([facility] [address] 2005    right lumpectomy    Chronic pain     neck pain    Cough     Depression     Hemorrhoid     Menopause 2005    Muscle pain     Neck problem     Pneumonia     sepis    S/P radiation therapy 2005    Sleep trouble     Thyroid disease     Urine troubles     unable to controll       Past Surgical History:   Procedure Laterality Date    HX BREAST LUMPECTOMY Right 2005    HX  SECTION      HX THYROIDECTOMY      IMPLANT BREAST SILICONE/EQ Bilateral 6538       Current Outpatient Medications   Medication Sig Dispense Refill    clonazePAM (KLONOPIN) 0.5 mg tablet TAKE 1-2 TABLETS EACH DAY AS NEEDED FOR ANXIETY  0    dextroamphetamine (DEXTROSTAT) 10 mg tab TAKE 1 TABLET BY MOUTH TWICE A DAY  0    VIIBRYD 40 mg tab tablet TAKE 1 TABLET BY MOUTH EVERY DAY  1    levothyroxine (SYNTHROID) 100 mcg tablet TAKE 1 TABLET BY MOUTH EVERY DAY BEFORE BREAKFAST 90 Tab 3    traZODone (DESYREL) 150 mg tablet TAKE 1 TABLET EVERY NIGHT 90 Tab 3    LORazepam (ATIVAN) 1 mg tablet TAKE 1 TABLET BY MOUTH TWICE A DAY AS NEEDED 60 Tab 1    promethazine (PHENERGAN) 25 mg tablet Take 1/2-1 tablet every 6 hours as needed for nausea. 30 Tab 2    ondansetron (ZOFRAN ODT) 4 mg disintegrating tablet Take 1 Tab by mouth every eight (8) hours as needed for Nausea. 30 Tab 2    levothyroxine (SYNTHROID) 100 mcg tablet Take 1 Tab by mouth Daily (before breakfast). 90 Tab 3    Bacillus coagulans (PROBIOTIC, B. COAGULANS, PO) Take  by mouth.  naproxen sodium (ALEVE) 220 mg cap Take  by mouth.  permethrin (ACTICIN) 5 % topical cream permethrin 5 % topical cream   APPLY (THOROUGHLY MASSAGE INTO SKIN FROM HEAD TO SOLES OF FEET) BY TOPICAL ROUTE ONCE LEAVE ON FOR 8-14 HR, THEN REMOVE BY THOROUGH WASHING      ARIPiprazole (ABILIFY) 5 mg tablet       sertraline (ZOLOFT) 100 mg tablet TAKE  1 1/2 TABLET BY MOUTH EVERY DAY  1    colestipol (COLESTID) 1 gram tablet Take 1 Tab by mouth two (2) times a day. (Patient not taking: Reported on 10/22/2019) 60 Tab 3       No Known Allergies      Objective:    Visit Vitals  /80 (BP 1 Location: Left arm, BP Patient Position: Sitting)   Pulse 81   Temp 97.9 °F (36.6 °C) (Oral)   Resp 18   Ht 5' 2.5\" (1.588 m)   SpO2 96%   BMI 21.60 kg/m²       Triny Acevedo is a 81 y.o. female who appears well and in no distress. She is awake, alert, and oriented. A skin examination was performed including her scalp, face (including eyelids), ears, neck, chest, back, abdomen, upper extremities (including digits/nails), lower extremities, breasts, buttocks; genital skin was not examined. She has scattered waxy macules and keratotic papules consistent with seborrheic keratoses. She has scattered red papules consistent with cherry angiomas. She has pink intradermal nevi and brown junctional nevi, no concerning features for severe atypia. She has a 3 x 3 mm pink papule on the right nasal sidewall, R/O BCC. She has thin scaled actinic keratosis on the right nasal dorsum. Assessment/Plan:  1. Family history of melanoma and nonmelanoma skin cancer.     I discussed sun protection, sunscreen use, the warning signs of skin cancer, the need for self-skin examinations, and the need for regular practitioner exams. The patient should follow up sooner as needed if new, changing, or symptomatic skin lesions arise.     2. Seborrheic keratoses. The diagnosis was reviewed and the patient was reassured that no treatment is needed for these benign lesions.     3. Cherry angiomas. The diagnosis was reviewed and the patient was reassured that no treatment is needed for these benign lesions.     4. Normal nevi. The diagnosis of normal nevi was reviewed. I discussed sun protection, sunscreen use, the warning signs of skin cancer, mole monitoring, the need for self-skin examinations, and the need for regular practitioner exams. The patient should follow up sooner as needed if new, changing, or symptomatic skin lesions arise. 5. Neoplasm of Uncertain Behavior, left nasal sidewall, R/O BCC. The differential diagnoses were discussed. A shave biopsy was advised to sample this lesion. The procedure was reviewed and verbal and written consent were obtained. The risks of pain, bleeding, infection, and scar were discussed. The patient is aware that this is a sample and is intended for diagnosis and not therapy of the skin lesion. I performed the procedure. The site was cleansed and anesthetized with 1% Lidocaine with Epinephrine 1:100,000. A shave biopsy was performed to sample the lesion. Drysol was used for hemostasis. The wound was bandaged and care reviewed. The specimen was sent to pathology. I will contact the patient with the results and any further treatment that may be necessary. 6. Actinic Keratoses. The diagnosis of this precancerous lesion related to sun exposure was reviewed. Verbal consent was obtained. I treated 1 lesions with cryotherapy and post-cryotherapy care was reviewed.     Next skin exam:  1 year    This plan was reviewed with the patient and patient agrees. All questions were answered. This scribe documentation was reviewed by me and accurately reflects the examination and decisions made by me. [facility]   OFFICE PROCEDURE PROGRESS NOTE   Chart reviewed for the following:   I, [place] Eliberto Dubin, have reviewed the History, Physical and updated the Allergic reactions for [employer]. TIME OUT performed immediately prior to start of procedure:   I, Shelley B. Eliberto Dubin, have performed the following reviews on [employer]   prior to the start of the procedure:     * Patient was identified by name and date of birth   * Agreement on procedure being performed was verified   * Risks and Benefits explained to the patient   * Procedure site verified and marked as necessary   * Patient was positioned for comfort   * Consent was signed and verified     Time: 3:00 PM  Date of procedure: 10/22/2019  Procedure performed by: Britany Billy DNP  Provider assisted by: self  Patient assisted by: self   How tolerated by patient: tolerated the procedure well with no complications   Comments: none

## 2019-10-22 NOTE — PROGRESS NOTES
Chief Complaint   Patient presents with    Skin Exam     NP, full skin exam.      1. Have you been to the ER, urgent care clinic since your last visit? Hospitalized since your last visit? No    2. Have you seen or consulted any other health care providers outside of the [address] [place] [address] since your last visit? Include any pap smears or colon screening.  No

## 2019-10-28 ENCOUNTER — HOSPITAL ENCOUNTER (OUTPATIENT)
Dept: MAMMOGRAPHY | Age: 69
Discharge: HOME | End: 2019-10-28
Payer: MEDICARE

## 2019-10-28 DIAGNOSIS — Z12.31 VISIT FOR SCREENING MAMMOGRAM: ICD-10-CM

## 2019-10-28 PROCEDURE — 77067 SCR MAMMO BI INCL CAD: CPT

## 2019-10-30 ENCOUNTER — HOSPITAL ENCOUNTER (OUTPATIENT)
Dept: MAMMOGRAPHY | Age: 69
Discharge: HOME | End: 2019-10-30
Attending: FAMILY MEDICINE
Payer: MEDICARE

## 2019-10-30 DIAGNOSIS — Z78.0 POSTMENOPAUSAL: ICD-10-CM

## 2019-10-30 DIAGNOSIS — M85.80 OSTEOPENIA, UNSPECIFIED LOCATION: ICD-10-CM

## 2019-10-30 PROCEDURE — 77080 DXA BONE DENSITY AXIAL: CPT

## 2020-01-07 DIAGNOSIS — E03.9 ACQUIRED HYPOTHYROIDISM: ICD-10-CM

## 2020-01-08 RX ORDER — LEVOTHYROXINE SODIUM 100 UG/1
TABLET ORAL
Qty: 90 TAB | Refills: 3 | Status: SHIPPED | OUTPATIENT
Start: 2020-01-08 | End: 2020-11-05

## 2020-02-11 ENCOUNTER — TELEPHONE (OUTPATIENT)
Dept: INTERNAL MEDICINE CLINIC | Age: 70
End: 2020-02-11

## 2020-02-11 DIAGNOSIS — D53.9 MACROCYTIC ANEMIA: Primary | ICD-10-CM

## 2020-02-11 DIAGNOSIS — E03.9 ACQUIRED HYPOTHYROIDISM: ICD-10-CM

## 2020-02-11 DIAGNOSIS — I67.9 CEREBROVASCULAR DISEASE: ICD-10-CM

## 2020-02-11 DIAGNOSIS — R79.89 ELEVATED SERUM CREATININE: ICD-10-CM

## 2020-02-11 NOTE — TELEPHONE ENCOUNTER
Appointment Request From: Nel Alcantar     With Provider: Adriano yLle MD [-Primary Care Physician-]     Preferred Date Range: Any date 2/11/2020 or later     Preferred Times: Any     Reason: To address the following health maintenance concerns. Glaucoma Screening Q2y     Comments:   [facility] sent you some results from my physical during a research trial.  One suggestion is for Hematology consult for Macrocytic anemia, H/H 10.2/31.7, RBC 3.03, creatinine 1.02 and other abnormalities.  Consult with Urologist for abnormal U/A.  Consult Neurologist for small vessel disease & mutiple scattered T2 FLAIR hyperintensity in the supratentorial and periventricular white matter.     Should I come in to see you?  Need some names in those specialities.  Seeing NP soon to change some of psychiatric meds.  Feeling very tired physically, head not great either, minimal appetite remains (with or without dexedrine)  but I've gained 8 pounds & joined silver sneakers.  Bulmaro Lopez

## 2020-02-14 ENCOUNTER — TELEPHONE (OUTPATIENT)
Dept: INTERNAL MEDICINE CLINIC | Age: 70
End: 2020-02-14

## 2020-02-14 NOTE — TELEPHONE ENCOUNTER
MD Mary Jane Cruz LPN   Caller: Unspecified (3 days ago,  3:55 PM)             I do not see copies of labs from [facility] can order the labs to further evaluate the anemia.  I have added those labs. Aristides Shah does not need to see a hematologist. Sonido Higgins can come in nonfasting.  I will also recheck the kidney function, however the creatinine is very mildly elevated by what she describes. Aristides Shah does not need to see a urologist or kidney specialist.  I have added referral to Dr. Elton Lucas, neurology for cerebrovascular disease      Responded to patients [employer] message with the above message from Dr. Blaine Campos.

## 2020-02-14 NOTE — TELEPHONE ENCOUNTER
#[phone] pt states she just go study results sent to Dr. Susanna Sarmiento. Pt would like to have an appt to discuss with doctor as soon as possible. Please call to schedule as there are no appts for psr to schedule. Pt states there are many decisions to be made and she needs to discuss all of this?

## 2020-02-22 ENCOUNTER — APPOINTMENT (OUTPATIENT)
Dept: GENERAL RADIOLOGY | Age: 70
DRG: 193 | End: 2020-02-22
Attending: EMERGENCY MEDICINE
Payer: MEDICARE

## 2020-02-22 ENCOUNTER — HOSPITAL ENCOUNTER (INPATIENT)
Age: 70
LOS: 6 days | Discharge: HOME | DRG: 193 | End: 2020-02-28
Attending: EMERGENCY MEDICINE | Admitting: HOSPITALIST
Payer: MEDICARE

## 2020-02-22 DIAGNOSIS — R09.02 HYPOXIA: ICD-10-CM

## 2020-02-22 DIAGNOSIS — R06.2 WHEEZING: ICD-10-CM

## 2020-02-22 DIAGNOSIS — J10.1 INFLUENZA A: Primary | ICD-10-CM

## 2020-02-22 PROBLEM — J96.01 ACUTE RESPIRATORY FAILURE WITH HYPOXIA (HCC): Status: ACTIVE | Noted: 2020-02-22

## 2020-02-22 LAB
ALBUMIN SERPL-MCNC: 3 G/DL (ref 3.5–5)
ALBUMIN/GLOB SERPL: 0.7 {RATIO} (ref 1.1–2.2)
ALP SERPL-CCNC: 46 U/L (ref 45–117)
ALT SERPL-CCNC: 19 U/L (ref 12–78)
ANION GAP SERPL CALC-SCNC: 9 MMOL/L (ref 5–15)
AST SERPL-CCNC: 18 U/L (ref 15–37)
BASOPHILS # BLD: 0 K/UL (ref 0–0.1)
BASOPHILS NFR BLD: 0 % (ref 0–1)
BILIRUB SERPL-MCNC: 0.5 MG/DL (ref 0.2–1)
BNP SERPL-MCNC: 1005 PG/ML (ref 0–125)
BUN SERPL-MCNC: 15 MG/DL (ref 6–20)
BUN/CREAT SERPL: 20 (ref 12–20)
CALCIUM SERPL-MCNC: 8.5 MG/DL (ref 8.5–10.1)
CHLORIDE SERPL-SCNC: 100 MMOL/L (ref 97–108)
CO2 SERPL-SCNC: 28 MMOL/L (ref 21–32)
COMMENT, HOLDF: NORMAL
CREAT SERPL-MCNC: 0.76 MG/DL (ref 0.55–1.02)
DIFFERENTIAL METHOD BLD: ABNORMAL
EOSINOPHIL # BLD: 0.1 K/UL (ref 0–0.4)
EOSINOPHIL NFR BLD: 1 % (ref 0–7)
ERYTHROCYTE [DISTWIDTH] IN BLOOD BY AUTOMATED COUNT: 13.2 % (ref 11.5–14.5)
FLUAV AG NPH QL IA: POSITIVE
FLUBV AG NOSE QL IA: NEGATIVE
GLOBULIN SER CALC-MCNC: 4.1 G/DL (ref 2–4)
GLUCOSE SERPL-MCNC: 137 MG/DL (ref 65–100)
HCT VFR BLD AUTO: 32.3 % (ref 35–47)
HGB BLD-MCNC: 10.4 G/DL (ref 11.5–16)
IMM GRANULOCYTES # BLD AUTO: 0 K/UL (ref 0–0.04)
IMM GRANULOCYTES NFR BLD AUTO: 0 % (ref 0–0.5)
LYMPHOCYTES # BLD: 0.8 K/UL (ref 0.8–3.5)
LYMPHOCYTES NFR BLD: 7 % (ref 12–49)
MCH RBC QN AUTO: 33.9 PG (ref 26–34)
MCHC RBC AUTO-ENTMCNC: 32.2 G/DL (ref 30–36.5)
MCV RBC AUTO: 105.2 FL (ref 80–99)
MONOCYTES # BLD: 1.2 K/UL (ref 0–1)
MONOCYTES NFR BLD: 11 % (ref 5–13)
NEUTS SEG # BLD: 8.6 K/UL (ref 1.8–8)
NEUTS SEG NFR BLD: 81 % (ref 32–75)
NRBC # BLD: 0 K/UL (ref 0–0.01)
NRBC BLD-RTO: 0 PER 100 WBC
PLATELET # BLD AUTO: 264 K/UL (ref 150–400)
PMV BLD AUTO: 10.1 FL (ref 8.9–12.9)
POTASSIUM SERPL-SCNC: 4 MMOL/L (ref 3.5–5.1)
PROT SERPL-MCNC: 7.1 G/DL (ref 6.4–8.2)
RBC # BLD AUTO: 3.07 M/UL (ref 3.8–5.2)
RBC MORPH BLD: ABNORMAL
SAMPLES BEING HELD,HOLD: NORMAL
SODIUM SERPL-SCNC: 137 MMOL/L (ref 136–145)
TROPONIN I SERPL-MCNC: <0.05 NG/ML
WBC # BLD AUTO: 10.7 K/UL (ref 3.6–11)

## 2020-02-22 PROCEDURE — 96374 THER/PROPH/DIAG INJ IV PUSH: CPT

## 2020-02-22 PROCEDURE — 84484 ASSAY OF TROPONIN QUANT: CPT

## 2020-02-22 PROCEDURE — 87804 INFLUENZA ASSAY W/OPTIC: CPT

## 2020-02-22 PROCEDURE — 74011000250 HC RX REV CODE- 250: Performed by: HOSPITALIST

## 2020-02-22 PROCEDURE — 36415 COLL VENOUS BLD VENIPUNCTURE: CPT

## 2020-02-22 PROCEDURE — 74011250636 HC RX REV CODE- 250/636: Performed by: EMERGENCY MEDICINE

## 2020-02-22 PROCEDURE — 87040 BLOOD CULTURE FOR BACTERIA: CPT

## 2020-02-22 PROCEDURE — 93005 ELECTROCARDIOGRAM TRACING: CPT

## 2020-02-22 PROCEDURE — 65660000000 HC RM CCU STEPDOWN

## 2020-02-22 PROCEDURE — 71046 X-RAY EXAM CHEST 2 VIEWS: CPT

## 2020-02-22 PROCEDURE — 80053 COMPREHEN METABOLIC PANEL: CPT

## 2020-02-22 PROCEDURE — 83880 ASSAY OF NATRIURETIC PEPTIDE: CPT

## 2020-02-22 PROCEDURE — 96361 HYDRATE IV INFUSION ADD-ON: CPT

## 2020-02-22 PROCEDURE — 85025 COMPLETE CBC W/AUTO DIFF WBC: CPT

## 2020-02-22 PROCEDURE — 74011000250 HC RX REV CODE- 250: Performed by: EMERGENCY MEDICINE

## 2020-02-22 RX ORDER — SODIUM CHLORIDE 0.9 % (FLUSH) 0.9 %
5-40 SYRINGE (ML) INJECTION EVERY 8 HOURS
Status: DISCONTINUED | OUTPATIENT
Start: 2020-02-22 | End: 2020-02-28 | Stop reason: HOSPADM

## 2020-02-22 RX ORDER — SODIUM CHLORIDE 0.9 % (FLUSH) 0.9 %
5-10 SYRINGE (ML) INJECTION AS NEEDED
Status: DISCONTINUED | OUTPATIENT
Start: 2020-02-22 | End: 2020-02-28 | Stop reason: HOSPADM

## 2020-02-22 RX ORDER — IPRATROPIUM BROMIDE AND ALBUTEROL SULFATE 2.5; .5 MG/3ML; MG/3ML
3 SOLUTION RESPIRATORY (INHALATION)
Status: DISCONTINUED | OUTPATIENT
Start: 2020-02-22 | End: 2020-02-23

## 2020-02-22 RX ORDER — SODIUM CHLORIDE 0.9 % (FLUSH) 0.9 %
5-40 SYRINGE (ML) INJECTION AS NEEDED
Status: DISCONTINUED | OUTPATIENT
Start: 2020-02-22 | End: 2020-02-28 | Stop reason: HOSPADM

## 2020-02-22 RX ADMIN — IPRATROPIUM BROMIDE AND ALBUTEROL SULFATE 3 ML: .5; 3 SOLUTION RESPIRATORY (INHALATION) at 23:00

## 2020-02-22 RX ADMIN — SODIUM CHLORIDE 1000 ML: 900 INJECTION, SOLUTION INTRAVENOUS at 18:41

## 2020-02-22 RX ADMIN — METHYLPREDNISOLONE SODIUM SUCCINATE 125 MG: 125 INJECTION, POWDER, FOR SOLUTION INTRAMUSCULAR; INTRAVENOUS at 18:59

## 2020-02-22 RX ADMIN — ALBUTEROL SULFATE 1 DOSE: 2.5 SOLUTION RESPIRATORY (INHALATION) at 18:58

## 2020-02-22 NOTE — ED PROVIDER NOTES
44-year-old female with history of prior pneumonia precipitating sepsis, hypothyroid, and prior breast cancer, status post right lumpectomy presents to the emergency department stating that she has had a one-week history of clear rhinorrhea that worsened on Thursday leading to progressively increasing cough, shortness of breath, wheezing, fever, chills, myalgias, headache. [facility] where she was quickly evaluated and EMS was called to transport her directly to the ED for further evaluation. Patient reportedly was hypoxic to 88 on room air on arrival and was given a DuoNeb while in route and had some improvement bring her saturations up into the mid 90s. She denies any history of prior COPD, asthma, but states that she has had to use albuterol breathing treatments previously. She denies any known sick contacts or recent travel. She has not been taking anything for her symptoms. She states that she is currently a \"light smoker. \"           Past Medical History:   Diagnosis Date    Anemia     Anxiety     Breast cancer ([facility] [address] 2005    right lumpectomy    Chronic pain     neck pain    Cough     Depression     Hemorrhoid     Menopause 2005    Muscle pain     Neck problem     Pneumonia     sepis    S/P radiation therapy 2005    Sleep trouble     Thyroid disease     Urine troubles     unable to controll       Past Surgical History:   Procedure Laterality Date    HX BREAST LUMPECTOMY Right 2005    HX  SECTION      HX THYROIDECTOMY      IMPLANT BREAST SILICONE/EQ Bilateral 9779         Family History:   Problem Relation Age of Onset    Cancer Mother        Social History     Socioeconomic History    Marital status:      Spouse name: Not on file    Number of children: Not on file    Years of education: Not on file    Highest education level: Not on file   Occupational History    Not on file   Social Needs    Financial resource strain: Not on file   [facility] Food insecurity:     Worry: Not on file     Inability: Not on file    Transportation needs:     Medical: Not on file     Non-medical: Not on file   Tobacco Use    Smoking status: Light Tobacco Smoker     Last attempt to quit: 1/1/2010     Years since quitting: 10.1    Smokeless tobacco: Never Used   Substance and Sexual Activity    Alcohol use: Yes     Alcohol/week: 14.0 standard drinks     Types: 14 Shots of liquor per week    Drug use: No    Sexual activity: Yes     Partners: Male     Birth control/protection: None   Lifestyle    Physical activity:     Days per week: Not on file     Minutes per session: Not on file    Stress: Not on file   Relationships    Social connections:     Talks on phone: Not on file     Gets together: Not on file     Attends Judaism service: Not on file     Active member of club or organization: Not on file     Attends meetings of clubs or organizations: Not on file     Relationship status: Not on file    Intimate partner violence:     Fear of current or ex partner: Not on file     Emotionally abused: Not on file     Physically abused: Not on file     Forced sexual activity: Not on file   Other Topics Concern    Not on file   Social History Narrative    Not on file         ALLERGIES: Patient has no known allergies. Review of Systems   Constitutional: Positive for activity change, chills, fatigue and fever. Negative for appetite change. HENT: Positive for congestion, rhinorrhea, sinus pressure and sneezing. Negative for sore throat. Eyes: Negative for photophobia and visual disturbance. Respiratory: Positive for cough, chest tightness, shortness of breath and wheezing. Cardiovascular: Negative for chest pain. Gastrointestinal: Negative for abdominal pain, blood in stool, constipation, diarrhea, nausea and vomiting.    Genitourinary: Negative for difficulty urinating, dysuria, flank pain, frequency, hematuria, menstrual problem, urgency, vaginal bleeding and vaginal discharge. Musculoskeletal: Positive for myalgias. Negative for arthralgias, back pain and neck pain. Skin: Negative for rash and wound. Neurological: Negative for syncope, weakness, numbness and headaches. Psychiatric/Behavioral: Negative for self-injury and suicidal ideas. All other systems reviewed and are negative. Vitals:    02/22/20 1834 02/22/20 1910   BP: 151/77    Pulse: 100    Resp: 24    Temp: 99.8 °F (37.7 °C)    SpO2: 90% 96%   Weight: 54.4 kg (120 lb)    Height: 5' 2\" (1.575 m)             Physical Exam  Vitals signs and nursing note reviewed. Constitutional:       General: She is not in acute distress. Appearance: Normal appearance. She is well-developed. She is ill-appearing. She is not diaphoretic. HENT:      Head: Normocephalic and atraumatic. Nose: Congestion and rhinorrhea present. Mouth/Throat:      Mouth: Mucous membranes are dry. Comments: Mildly erythematous posterior oropharynx without tonsillar exudates, asymmetric swelling, or trismus. Eyes:      Extraocular Movements: Extraocular movements intact. Conjunctiva/sclera: Conjunctivae normal.      Pupils: Pupils are equal, round, and reactive to light. Neck:      Musculoskeletal: Normal range of motion and neck supple. Cardiovascular:      Rate and Rhythm: Regular rhythm. Tachycardia present. Heart sounds: Normal heart sounds. Pulmonary:      Effort: Pulmonary effort is normal.      Breath sounds: Normal breath sounds. Abdominal:      General: There is no distension. Palpations: Abdomen is soft. Tenderness: There is no abdominal tenderness. Musculoskeletal:         General: No tenderness. Lymphadenopathy:      Cervical: Cervical adenopathy present. Skin:     General: Skin is warm and dry. Neurological:      General: No focal deficit present. Mental Status: She is alert and oriented to person, place, and time. Cranial Nerves: No cranial nerve deficit. Sensory: No sensory deficit. Motor: No weakness. Coordination: Coordination normal.          MDM   70-year-old female presents with influenza and hypoxia, placed on 2 L by nasal cannula and stabilized satting in the mid to high 90s. Out of the window for Tamiflu as symptoms have rhinorrhea started about a week ago and cough and worsening started about 3 days ago. She was given DuoNeb, Solu-Medrol, IV fluid bolus. Septic work-up was initiated on arrival.  Influenza swab positive for influenza A  Labs returned showing no other significant abnormalities, WBC 10.7, Hg 10.4, negative troponin, BNP mildly elevated at 1005. Low suspicion for CHF but high suspicion for hypoxic respiratory failure secondary to influenza    CXR viewed by myself and read by radiology showing no acute abnormalities. Procedures  Total critical care time spent exclusive of procedures: 40 minutes    1850 EKG shows normal sinus rhythm with a rate of 88 bpm with no acute ST or T wave abnormalities suggestive of ischemia. Hospitalist Kristopher Serve for Admission  7:59 PM    ED Room Number: SER07/07  Patient Name and age:  Shaheed Nunes 81 y.o.  female  Working Diagnosis:   1. Influenza A    2. Hypoxia    3. Wheezing      Readmission: no  Isolation Requirements:  no  Recommended Level of Care:  telemetry  Code Status:  Full Code  Department:[place] [facility] - [phone]  Other:  Hypoxic to 88 but now stable on 2L. XR negative. Flu A positive.

## 2020-02-23 ENCOUNTER — APPOINTMENT (OUTPATIENT)
Dept: CT IMAGING | Age: 70
DRG: 193 | End: 2020-02-23
Attending: INTERNAL MEDICINE
Payer: MEDICARE

## 2020-02-23 LAB
ALBUMIN SERPL-MCNC: 2.9 G/DL (ref 3.5–5)
ALBUMIN/GLOB SERPL: 0.8 {RATIO} (ref 1.1–2.2)
ALP SERPL-CCNC: 42 U/L (ref 45–117)
ALT SERPL-CCNC: 14 U/L (ref 12–78)
ANION GAP SERPL CALC-SCNC: 5 MMOL/L (ref 5–15)
APPEARANCE UR: CLEAR
AST SERPL-CCNC: 16 U/L (ref 15–37)
ATRIAL RATE: 88 BPM
BACTERIA URNS QL MICRO: NEGATIVE /HPF
BASOPHILS # BLD: 0 K/UL (ref 0–0.1)
BASOPHILS NFR BLD: 0 % (ref 0–1)
BILIRUB SERPL-MCNC: 0.2 MG/DL (ref 0.2–1)
BILIRUB UR QL: NEGATIVE
BUN SERPL-MCNC: 18 MG/DL (ref 6–20)
BUN/CREAT SERPL: 20 (ref 12–20)
CALCIUM SERPL-MCNC: 8.4 MG/DL (ref 8.5–10.1)
CALCULATED P AXIS, ECG09: 83 DEGREES
CALCULATED R AXIS, ECG10: 51 DEGREES
CALCULATED T AXIS, ECG11: 71 DEGREES
CHLORIDE SERPL-SCNC: 105 MMOL/L (ref 97–108)
CHOLEST SERPL-MCNC: 132 MG/DL
CO2 SERPL-SCNC: 27 MMOL/L (ref 21–32)
COLOR UR: ABNORMAL
CREAT SERPL-MCNC: 0.89 MG/DL (ref 0.55–1.02)
DIAGNOSIS, 93000: NORMAL
DIFFERENTIAL METHOD BLD: ABNORMAL
EOSINOPHIL # BLD: 0 K/UL (ref 0–0.4)
EOSINOPHIL NFR BLD: 0 % (ref 0–7)
EPITH CASTS URNS QL MICRO: ABNORMAL /LPF
ERYTHROCYTE [DISTWIDTH] IN BLOOD BY AUTOMATED COUNT: 13.4 % (ref 11.5–14.5)
EST. AVERAGE GLUCOSE BLD GHB EST-MCNC: 114 MG/DL
FERRITIN SERPL-MCNC: 130 NG/ML (ref 26–388)
FOLATE SERPL-MCNC: 23.1 NG/ML (ref 5–21)
GLOBULIN SER CALC-MCNC: 3.8 G/DL (ref 2–4)
GLUCOSE SERPL-MCNC: 129 MG/DL (ref 65–100)
GLUCOSE UR STRIP.AUTO-MCNC: NEGATIVE MG/DL
HBA1C MFR BLD: 5.6 % (ref 4–5.6)
HCT VFR BLD AUTO: 31.6 % (ref 35–47)
HDLC SERPL-MCNC: 61 MG/DL
HDLC SERPL: 2.2 {RATIO} (ref 0–5)
HGB BLD-MCNC: 9.9 G/DL (ref 11.5–16)
HGB UR QL STRIP: ABNORMAL
IMM GRANULOCYTES # BLD AUTO: 0 K/UL (ref 0–0.04)
IMM GRANULOCYTES NFR BLD AUTO: 0 % (ref 0–0.5)
IRON SATN MFR SERPL: 14 % (ref 20–50)
IRON SERPL-MCNC: 20 UG/DL (ref 35–150)
IRON SERPL-MCNC: 36 UG/DL (ref 35–150)
KETONES UR QL STRIP.AUTO: NEGATIVE MG/DL
LDLC SERPL CALC-MCNC: 61.8 MG/DL (ref 0–100)
LEUKOCYTE ESTERASE UR QL STRIP.AUTO: NEGATIVE
LIPID PROFILE,FLP: NORMAL
LYMPHOCYTES # BLD: 0.2 K/UL (ref 0.8–3.5)
LYMPHOCYTES NFR BLD: 3 % (ref 12–49)
MAGNESIUM SERPL-MCNC: 2.3 MG/DL (ref 1.6–2.4)
MCH RBC QN AUTO: 33.8 PG (ref 26–34)
MCHC RBC AUTO-ENTMCNC: 31.3 G/DL (ref 30–36.5)
MCV RBC AUTO: 107.8 FL (ref 80–99)
MONOCYTES # BLD: 0.2 K/UL (ref 0–1)
MONOCYTES NFR BLD: 3 % (ref 5–13)
NEUTS SEG # BLD: 7.6 K/UL (ref 1.8–8)
NEUTS SEG NFR BLD: 94 % (ref 32–75)
NITRITE UR QL STRIP.AUTO: NEGATIVE
NRBC # BLD: 0 K/UL (ref 0–0.01)
NRBC BLD-RTO: 0 PER 100 WBC
OTHER,OTHU: ABNORMAL
P-R INTERVAL, ECG05: 140 MS
PH UR STRIP: 6.5 [PH] (ref 5–8)
PHOSPHATE SERPL-MCNC: 3.4 MG/DL (ref 2.6–4.7)
PLATELET # BLD AUTO: 265 K/UL (ref 150–400)
PMV BLD AUTO: 10.1 FL (ref 8.9–12.9)
POTASSIUM SERPL-SCNC: 4 MMOL/L (ref 3.5–5.1)
PROT SERPL-MCNC: 6.7 G/DL (ref 6.4–8.2)
PROT UR STRIP-MCNC: NEGATIVE MG/DL
Q-T INTERVAL, ECG07: 364 MS
QRS DURATION, ECG06: 76 MS
QTC CALCULATION (BEZET), ECG08: 440 MS
RBC # BLD AUTO: 2.93 M/UL (ref 3.8–5.2)
RBC #/AREA URNS HPF: ABNORMAL /HPF (ref 0–5)
RBC MORPH BLD: ABNORMAL
SODIUM SERPL-SCNC: 137 MMOL/L (ref 136–145)
SP GR UR REFRACTOMETRY: <1.005 (ref 1–1.03)
TIBC SERPL-MCNC: 256 UG/DL (ref 250–450)
TRIGL SERPL-MCNC: 46 MG/DL (ref ?–150)
TROPONIN I SERPL-MCNC: <0.05 NG/ML
TROPONIN I SERPL-MCNC: <0.05 NG/ML
TSH SERPL DL<=0.05 MIU/L-ACNC: 0.68 UIU/ML (ref 0.36–3.74)
UR CULT HOLD, URHOLD: NORMAL
UROBILINOGEN UR QL STRIP.AUTO: 0.2 EU/DL (ref 0.2–1)
VENTRICULAR RATE, ECG03: 88 BPM
VIT B12 SERPL-MCNC: 635 PG/ML (ref 193–986)
VLDLC SERPL CALC-MCNC: 9.2 MG/DL
WBC # BLD AUTO: 8 K/UL (ref 3.6–11)
WBC URNS QL MICRO: ABNORMAL /HPF (ref 0–4)

## 2020-02-23 PROCEDURE — 74011250637 HC RX REV CODE- 250/637: Performed by: NURSE PRACTITIONER

## 2020-02-23 PROCEDURE — 81001 URINALYSIS AUTO W/SCOPE: CPT

## 2020-02-23 PROCEDURE — 84100 ASSAY OF PHOSPHORUS: CPT

## 2020-02-23 PROCEDURE — 74011250637 HC RX REV CODE- 250/637: Performed by: HOSPITALIST

## 2020-02-23 PROCEDURE — 36415 COLL VENOUS BLD VENIPUNCTURE: CPT

## 2020-02-23 PROCEDURE — 82746 ASSAY OF FOLIC ACID SERUM: CPT

## 2020-02-23 PROCEDURE — 83735 ASSAY OF MAGNESIUM: CPT

## 2020-02-23 PROCEDURE — 84484 ASSAY OF TROPONIN QUANT: CPT

## 2020-02-23 PROCEDURE — 83540 ASSAY OF IRON: CPT

## 2020-02-23 PROCEDURE — 77030027138 HC INCENT SPIROMETER -A

## 2020-02-23 PROCEDURE — 74011000250 HC RX REV CODE- 250: Performed by: HOSPITALIST

## 2020-02-23 PROCEDURE — 85025 COMPLETE CBC W/AUTO DIFF WBC: CPT

## 2020-02-23 PROCEDURE — 87077 CULTURE AEROBIC IDENTIFY: CPT

## 2020-02-23 PROCEDURE — 94640 AIRWAY INHALATION TREATMENT: CPT

## 2020-02-23 PROCEDURE — 80061 LIPID PANEL: CPT

## 2020-02-23 PROCEDURE — 80053 COMPREHEN METABOLIC PANEL: CPT

## 2020-02-23 PROCEDURE — 87070 CULTURE OTHR SPECIMN AEROBIC: CPT

## 2020-02-23 PROCEDURE — 82607 VITAMIN B-12: CPT

## 2020-02-23 PROCEDURE — 83036 HEMOGLOBIN GLYCOSYLATED A1C: CPT

## 2020-02-23 PROCEDURE — 74011250637 HC RX REV CODE- 250/637: Performed by: INTERNAL MEDICINE

## 2020-02-23 PROCEDURE — 84443 ASSAY THYROID STIM HORMONE: CPT

## 2020-02-23 PROCEDURE — 65660000000 HC RM CCU STEPDOWN

## 2020-02-23 PROCEDURE — 87186 SC STD MICRODIL/AGAR DIL: CPT

## 2020-02-23 PROCEDURE — 82728 ASSAY OF FERRITIN: CPT

## 2020-02-23 RX ORDER — IPRATROPIUM BROMIDE AND ALBUTEROL SULFATE 2.5; .5 MG/3ML; MG/3ML
3 SOLUTION RESPIRATORY (INHALATION)
Status: DISCONTINUED | OUTPATIENT
Start: 2020-02-23 | End: 2020-02-24

## 2020-02-23 RX ORDER — LEVOTHYROXINE SODIUM 50 UG/1
100 TABLET ORAL
Status: DISCONTINUED | OUTPATIENT
Start: 2020-02-23 | End: 2020-02-28 | Stop reason: HOSPADM

## 2020-02-23 RX ORDER — DEXTROAMPHETAMINE SULFATE 10 MG/1
10 TABLET ORAL 2 TIMES DAILY
Status: DISCONTINUED | OUTPATIENT
Start: 2020-02-23 | End: 2020-02-23

## 2020-02-23 RX ORDER — GUAIFENESIN 600 MG/1
600 TABLET, EXTENDED RELEASE ORAL EVERY 12 HOURS
Status: DISCONTINUED | OUTPATIENT
Start: 2020-02-23 | End: 2020-02-28 | Stop reason: HOSPADM

## 2020-02-23 RX ORDER — VILAZODONE HYDROCHLORIDE 40 MG/1
40 TABLET ORAL DAILY
Status: DISCONTINUED | OUTPATIENT
Start: 2020-02-23 | End: 2020-02-28 | Stop reason: HOSPADM

## 2020-02-23 RX ORDER — AZITHROMYCIN 250 MG/1
500 TABLET, FILM COATED ORAL DAILY
Status: COMPLETED | OUTPATIENT
Start: 2020-02-23 | End: 2020-02-27

## 2020-02-23 RX ORDER — TRAZODONE HYDROCHLORIDE 100 MG/1
150 TABLET ORAL
Status: DISCONTINUED | OUTPATIENT
Start: 2020-02-23 | End: 2020-02-28 | Stop reason: HOSPADM

## 2020-02-23 RX ORDER — CLONAZEPAM 1 MG/1
0.5 TABLET ORAL DAILY PRN
Status: DISCONTINUED | OUTPATIENT
Start: 2020-02-23 | End: 2020-02-28 | Stop reason: HOSPADM

## 2020-02-23 RX ORDER — FUROSEMIDE 10 MG/ML
40 INJECTION INTRAMUSCULAR; INTRAVENOUS ONCE
Status: ACTIVE | OUTPATIENT
Start: 2020-02-23 | End: 2020-02-23

## 2020-02-23 RX ORDER — SODIUM CHLORIDE 0.9 % (FLUSH) 0.9 %
5-40 SYRINGE (ML) INJECTION AS NEEDED
Status: DISCONTINUED | OUTPATIENT
Start: 2020-02-23 | End: 2020-02-28 | Stop reason: HOSPADM

## 2020-02-23 RX ORDER — IPRATROPIUM BROMIDE AND ALBUTEROL SULFATE 2.5; .5 MG/3ML; MG/3ML
3 SOLUTION RESPIRATORY (INHALATION)
Status: DISCONTINUED | OUTPATIENT
Start: 2020-02-23 | End: 2020-02-28 | Stop reason: HOSPADM

## 2020-02-23 RX ORDER — OSELTAMIVIR PHOSPHATE 75 MG/1
75 CAPSULE ORAL 2 TIMES DAILY
Status: DISCONTINUED | OUTPATIENT
Start: 2020-02-23 | End: 2020-02-23

## 2020-02-23 RX ORDER — ACETAMINOPHEN 325 MG/1
650 TABLET ORAL
Status: DISCONTINUED | OUTPATIENT
Start: 2020-02-23 | End: 2020-02-28 | Stop reason: HOSPADM

## 2020-02-23 RX ORDER — OSELTAMIVIR PHOSPHATE 30 MG/1
30 CAPSULE ORAL 2 TIMES DAILY
Status: DISCONTINUED | OUTPATIENT
Start: 2020-02-23 | End: 2020-02-26

## 2020-02-23 RX ORDER — GUAIFENESIN 100 MG/5ML
81 LIQUID (ML) ORAL
Status: COMPLETED | OUTPATIENT
Start: 2020-02-23 | End: 2020-02-23

## 2020-02-23 RX ORDER — ONDANSETRON 2 MG/ML
4 INJECTION INTRAMUSCULAR; INTRAVENOUS
Status: DISCONTINUED | OUTPATIENT
Start: 2020-02-23 | End: 2020-02-28 | Stop reason: HOSPADM

## 2020-02-23 RX ORDER — HEPARIN SODIUM 5000 [USP'U]/ML
5000 INJECTION, SOLUTION INTRAVENOUS; SUBCUTANEOUS EVERY 8 HOURS
Status: DISCONTINUED | OUTPATIENT
Start: 2020-02-23 | End: 2020-02-24

## 2020-02-23 RX ORDER — IBUPROFEN 200 MG
1 TABLET ORAL EVERY 24 HOURS
Status: DISCONTINUED | OUTPATIENT
Start: 2020-02-23 | End: 2020-02-28 | Stop reason: HOSPADM

## 2020-02-23 RX ORDER — SODIUM CHLORIDE 0.9 % (FLUSH) 0.9 %
5-40 SYRINGE (ML) INJECTION EVERY 8 HOURS
Status: DISCONTINUED | OUTPATIENT
Start: 2020-02-23 | End: 2020-02-28 | Stop reason: HOSPADM

## 2020-02-23 RX ORDER — BISACODYL 5 MG
5 TABLET, DELAYED RELEASE (ENTERIC COATED) ORAL DAILY PRN
Status: DISCONTINUED | OUTPATIENT
Start: 2020-02-23 | End: 2020-02-28 | Stop reason: HOSPADM

## 2020-02-23 RX ADMIN — GUAIFENESIN 600 MG: 600 TABLET, EXTENDED RELEASE ORAL at 21:35

## 2020-02-23 RX ADMIN — Medication 5 ML: at 16:22

## 2020-02-23 RX ADMIN — OSELTAMIVIR PHOSPHATE 75 MG: 75 CAPSULE ORAL at 08:25

## 2020-02-23 RX ADMIN — ACETAMINOPHEN 650 MG: 325 TABLET ORAL at 07:24

## 2020-02-23 RX ADMIN — LEVOTHYROXINE SODIUM 100 MCG: 0.1 TABLET ORAL at 06:50

## 2020-02-23 RX ADMIN — Medication 1 CAPSULE: at 08:26

## 2020-02-23 RX ADMIN — GUAIFENESIN 600 MG: 600 TABLET, EXTENDED RELEASE ORAL at 08:21

## 2020-02-23 RX ADMIN — CLONAZEPAM 0.5 MG: 1 TABLET ORAL at 16:21

## 2020-02-23 RX ADMIN — ACETAMINOPHEN 650 MG: 325 TABLET ORAL at 21:35

## 2020-02-23 RX ADMIN — IPRATROPIUM BROMIDE AND ALBUTEROL SULFATE 3 ML: .5; 3 SOLUTION RESPIRATORY (INHALATION) at 21:39

## 2020-02-23 RX ADMIN — OSELTAMIVIR PHOSPHATE 30 MG: 30 CAPSULE ORAL at 17:59

## 2020-02-23 RX ADMIN — ASPIRIN 81 MG 81 MG: 81 TABLET ORAL at 17:59

## 2020-02-23 RX ADMIN — AZITHROMYCIN MONOHYDRATE 500 MG: 250 TABLET ORAL at 11:34

## 2020-02-23 NOTE — PROGRESS NOTES
Admitted this am for Flu  Refused CT   Echo pending  Add Zithromax as sputum yellow  New diagnosis of Macrocytic anemia will need out patient follow up

## 2020-02-23 NOTE — ROUTINE PROCESS
Bedside and Verbal shift change report given to [facility] (oncoming nurse) by Eliane Andrade (offgoing nurse). Report included the following information SBAR, Kardex, Intake/Output, MAR, Recent Results and Cardiac Rhythm NSR/ST.

## 2020-02-23 NOTE — PROGRESS NOTES
[id number]:  RN gave incentive spirometer to patient and encouraged usage. RN provided instructions on how to utilize. 1500:  Notified Dr. Brian Blancas last two troponin lab draws resulted as normal.  Per Dr. Brian Blancas discontinue order. 1620:  Patient stated she was experiencing anxiety. Patient administered PRN oral klonopin. Will continue to monitor patient. Patient also stated she was having difficultly expectorating mucus and was requesting RN to ask MD if there is any \"stronger form of expectorant\" that could be ordered. RN sent [employer] message to Dr. Brian Blancas notifying him of this. Will continue to monitor patient. Per Dr. Brian Blancas continue usage of Mucinex and that it takes some time to begin working. Patient notified and patient verbalized understanding and acceptance. 1626:  Notified pharmacy patient stated to RN that she did not want to take dextroamphetamine during her admission. 1655:  RN paged Dr. Brian Blancas. 1718:  RN paged Dr. Brian Blancas. 1753:  RN messaged Dr. Brian Blancas via [employer] to notify that patient is requesting respiratory treatments and is stating she is experiencing increased work of breathing due to congestion. Patient has stated she has been anxious about increased effort. [phone]:  Patient informed nurse she increased her oxygen to 2 liters. Patient's oxygen saturation on 1 liter was 92-93%. 1814:  Patient's  came up to nursing station and was asking when the last time patient had a respiratory treatment was and for updates on patient. 1816:  RN saw PRN order was already in place. RN called respiratory but there was no answer. Patient's  came back to nursing station and stated patient was now sleeping. RN stated to  once she wakes up if she still feels she needs a respiratory treatment to call RN. 1817:  Respiratory therapist returned call. RN notified them that patient was now sleeping but that RN would call them back if she needs a treatment once she wakes up.   1824: RN spoke to Dr. Adina Ashford. Per Dr. Adina Ashford scheduled respiratory treatments would be ordered. Will continue to monitor patient. 2024:  Bedside shift change report given to Krystle Trotter RN (oncoming nurse) by Fransisca Fraser RN (offgoing nurse). Report included the following information SBAR, Kardex, Intake/Output, MAR and Recent Results.

## 2020-02-23 NOTE — PROGRESS NOTES
Day #1 of Tamiflu  Indication:  Flu A  Current regimen:  75 mg BID    Recent Labs     20  0529 20  1837   WBC 8.0 10.7   CREA 0.89 0.76   BUN 18 15     Est CrCl: 48 ml/min;   Temp (24hrs), Av.5 °F (36.9 °C), Min:97.4 °F (36.3 °C), Max:99.8 °F (37.7 °C)      Plan: Change to 30 mg BID for CrCl 31 - 60 ml/min

## 2020-02-23 NOTE — CONSULTS
Cardiovascular Consult    Patient: Parris He MRN: [id number]  SSN: [id number]    YOB: 1950  Age: 81 y.o. Sex: female       Subjective:      Date of  Admission: 2020   Primary Care Provider: Sharad Stahl MD  Admission type:   Chief Complaint   Patient presents with    Flu Like Symptoms         Parris He is a 81 y.o.  female admitted for Acute respiratory failure with hypoxia ([facility] [J96.01]; Influenza A [J10.1]; Wheezing [R06.2]. Patient complains of dyspnea. This consultation was requested by Dr. Bailee Ledesma. Ms. Refugio Hansen reports two days of shortness of breath, productive cough, sinus congestion and inability to breathe through nose. No chest pain. No edema orthopnea or PND. No syncope or pre-syncope. She has been diagnosed with Influenza A. BNP was elevated and we are asked to see. She denies any history of cardiac problems or a family history of heart problems. Refusing CT scan. Adjusting oxygen in room and requesting home oxygen. History of hypothyroidism, anxiety/depression, alcohol and tobacco abuse      Past Medical History:   Diagnosis Date    Anemia     Anxiety     Breast cancer ([facility] 2005    right lumpectomy    Chronic pain     neck pain    Cough     Depression     Hemorrhoid     Menopause 2005    Muscle pain     Neck problem     Pneumonia     sepis    S/P radiation therapy 2005    Sleep trouble     Thyroid disease     Urine troubles     unable to controll      Past Surgical History:   Procedure Laterality Date    HX BREAST LUMPECTOMY Right 2005    HX  SECTION      HX THYROIDECTOMY      IMPLANT BREAST SILICONE/EQ Bilateral 8060     Family History   Problem Relation Age of Onset    Cancer Mother       Social History     Tobacco Use    Smoking status: Light Tobacco Smoker     Last attempt to quit: 2010     Years since quitting: 10.1    Smokeless tobacco: Never Used   Substance Use Topics    Alcohol use:  Yes Alcohol/week: 14.0 standard drinks     Types: 14 Shots of liquor per week      Current Facility-Administered Medications   Medication Dose Route Frequency    albuterol-ipratropium (DUO-NEB) 2.5 MG-0.5 MG/3 ML  3 mL Nebulization Q4H PRN    lactobac ac& pc-s.therm-b.anim (ALEXA Q/RISAQUAD)  1 Cap Oral DAILY    clonazePAM (KlonoPIN) tablet 0.5 mg  0.5 mg Oral DAILY PRN    levothyroxine (SYNTHROID) tablet 100 mcg  100 mcg Oral ACB    [Held by provider] traZODone (DESYREL) tablet 150 mg  150 mg Oral QHS    vilazodone (VIIBRYD) tablet 40 mg  40 mg Oral DAILY    sodium chloride (NS) flush 5-40 mL  5-40 mL IntraVENous Q8H    sodium chloride (NS) flush 5-40 mL  5-40 mL IntraVENous PRN    acetaminophen (TYLENOL) tablet 650 mg  650 mg Oral Q4H PRN    ondansetron (ZOFRAN) injection 4 mg  4 mg IntraVENous Q4H PRN    bisacodyL (DULCOLAX) tablet 5 mg  5 mg Oral DAILY PRN    nicotine (NICODERM CQ) 21 mg/24 hr patch 1 Patch  1 Patch TransDERmal Q24H    heparin (porcine) injection 5,000 Units  5,000 Units SubCUTAneous Q8H    guaiFENesin ER (MUCINEX) tablet 600 mg  600 mg Oral Q12H    azithromycin (ZITHROMAX) tablet 500 mg  500 mg Oral DAILY    oseltamivir (TAMIFLU) capsule 30 mg  30 mg Oral BID    aspirin chewable tablet 81 mg  81 mg Oral NOW    sodium chloride (NS) flush 5-10 mL  5-10 mL IntraVENous PRN    sodium chloride (NS) flush 5-40 mL  5-40 mL IntraVENous Q8H    sodium chloride (NS) flush 5-40 mL  5-40 mL IntraVENous PRN        No Known Allergies     Review of Symptoms:  Constitutional: Fevers,no chills. HEET: No trauma. No visual changes. No hearing loss. sore throat. nasal congestion. Neck: No adenopathy or swelling. Heart: No chest pain or palpitations. Lungs: shortness of breath  Abdomen: No pain. No nausea of vomiting. No BRBPR or melena. No diarrhea. Urology: No dysuria or hematuria. Ext: No edema. Skin: No acute rashes or ulcers. Endocrine: No heat or cold intolerance.   Neuro: No transient neurologic deficits. Subjective:     Visit Vitals  /81 (BP 1 Location: Left arm, BP Patient Position: At rest)   Pulse 94   Temp 97.4 °F (36.3 °C)   Resp 20   Ht 5' 2\" (1.575 m)   Wt 54.6 kg (120 lb 5.9 oz)   SpO2 92%   BMI 22.02 kg/m²     Physical Exam:  Head: Normocephalic, atraumatic. Eyes: Pupils equal, round, reactive to light and accomodation. , Extra ocular muscles intact. Sclera anicteric. Ears: Grossly responsive to sound. Neck: No adenopathy. No bruits. Throat: No sores or erythema. Heart: Regular rate and rhythm. Normal S1 and S2. No murmurs, gallops, or rub. Lungs: Rhonchi bilaterally  Abdomen: Soft, non-tender. No guarding or rebound. No hepatosplenomegaly. Bowel sounds active. Ext: No edema. No ulceration. Skin: Normal coloration. Warm and dry. No rash. Neuro: Cranial nerves II through XII intact. Motor and sensory grossly intact. Affect: Interactive.  Flat      Cardiographics:  ECG:  EKG Results     Procedure 720 Value Units Date/Time    EKG, 12 LEAD, INITIAL [[id number]] Collected:  02/22/20 1850    Order Status:  Completed Updated:  02/23/20 1734     Ventricular Rate 88 BPM      Atrial Rate 88 BPM      P-R Interval 140 ms      QRS Duration 76 ms      Q-T Interval 364 ms      QTC Calculation (Bezet) 440 ms      Calculated P Axis 83 degrees      Calculated R Axis 51 degrees      Calculated T Axis 71 degrees      Diagnosis --     Normal sinus rhythm  Normal ECG  No previous ECGs available  Confirmed by Ashley Arellano MD., --- ([id number]) on 2/23/2020 5:33:53 PM      EKG 12 LEAD INITIAL [[id number]]     Order Status:  Completed           Data Reviewed: CMP:   Lab Results   Component Value Date/Time     02/23/2020 05:29 AM    K 4.0 02/23/2020 05:29 AM     02/23/2020 05:29 AM    CO2 27 02/23/2020 05:29 AM    AGAP 5 02/23/2020 05:29 AM     (H) 02/23/2020 05:29 AM    BUN 18 02/23/2020 05:29 AM    CREA 0.89 02/23/2020 05:29 AM    GFRAA >60 02/23/2020 05:29 AM    GFRNA >60 02/23/2020 05:29 AM    CA 8.4 (L) 02/23/2020 05:29 AM    MG 2.3 02/23/2020 05:29 AM    PHOS 3.4 02/23/2020 05:29 AM    ALB 2.9 (L) 02/23/2020 05:29 AM    TP 6.7 02/23/2020 05:29 AM    GLOB 3.8 02/23/2020 05:29 AM    AGRAT 0.8 (L) 02/23/2020 05:29 AM    SGOT 16 02/23/2020 05:29 AM    ALT 14 02/23/2020 05:29 AM     CBC:   Lab Results   Component Value Date/Time    WBC 8.0 02/23/2020 05:29 AM    HGB 9.9 (L) 02/23/2020 05:29 AM    HCT 31.6 (L) 02/23/2020 05:29 AM     02/23/2020 05:29 AM     All Cardiac Markers in the last 24 hours:   Lab Results   Component Value Date/Time    TROIQ <0.05 02/23/2020 11:10 AM    Gary Bookbinder <0.05 02/23/2020 05:29 AM    TROIQ <0.05 02/22/2020 06:37 PM     ABG: No results found for: PH, PHI, PCO2, PCO2I, PO2, PO2I, HCO3, HCO3I, FIO2, FIO2I  COAGS: No results found for: APTT, PTP, INR, INREXT, INREXT     Assessment:         Hospital Problems  Date Reviewed: 2/23/2020          Codes Class Noted POA    Wheezing ICD-10-CM: R06.2  ICD-9-CM: 786.07  2/22/2020 Unknown        * (Principal) Influenza A ICD-10-CM: J10.1  ICD-9-CM: 487.1  2/22/2020 Yes        Acute respiratory failure with hypoxia [facility]) ICD-10-CM: J96.01  ICD-9-CM: 518.81  2/22/2020 Unknown               Plan:     Ms. Chris De Jesus is a 82 yo WF with cough, congestion and shortness of breath due to acute Influenza A infection. She has risk factors for coronary artery disease including ongoing tobacco use; however, she has no current clinical signs or symptoms of acute coronary insufficiency or congestive heart failure. Elevated BNP in the setting of Flu with congestion is not unexpected. Lasix given with no change in symptoms. Will follow up echocardiogram.  Suggest early discharge.       Mckayla Felipe MD  2/23/2020, 5:55 PM    [facility] Office:  [address]   [address] [address]  [address]  P: [phone]  F: 9 [facility] Office:  [address] [place] [address] [facility]  P: [phone]  F: [phone]

## 2020-02-23 NOTE — H&P
[address] [facility]  HISTORY AND PHYSICAL    Name:  Ana Britton  MR#:  [id number]  :  1950  ACCOUNT #:  [de-identified]  ADMIT DATE:  2020      Admission order was placed by the triage hospitalist while the patient was still at [facility] at [facility], but the patient did not arrive at [facility] to be physically admitted until 2020. The patient was seen, evaluated and admitted by me on 2020. PRIMARY CARE PHYSICIAN:  Ruddy Roberts MD    SOURCE OF INFORMATION:  Patient. CHIEF COMPLAINT:  Shortness of breath. HISTORY OF PRESENT ILLNESS:  This is a 33-year-old woman with past medical history significant for hypothyroidism, dyslipidemia, anxiety/depression, and right breast cancer, who was in her usual state of health until about a week ago when the patient developed nasal congestion. But in the last  24 to 48 hours, the nasal congestion became associated with shortness of breath. The shortness of breath is progressive and getting worse associated with cough which is productive of yellowish sputum. The patient also complained of headache. The headache is diffuse in location, not throbbing. The patient described the headache as mild and 4/10 in severity. The patient went to [facility] at [facility] for further evaluation. When the patient arrived at the emergency room, the patient was found to be influenza A antigen positive. The patient also has elevated BNP level. The patient has no history of asthma, no history of congestive heart failure. The patient was referred to the hospitalist service for evaluation for admission. The patient also stated that she was recently at [place] [address] and she is taking part in experimental treatment for depression. The patient has no fever, no rigors, no chills. PAST MEDICAL HISTORY:  1. Hypothyroidism. 2.  Dyslipidemia. 3.  Anxiety/depression.     ALLERGIES: NO KNOWN DRUG ALLERGIES. MEDICATIONS:  1. Probiotic 1 tablet daily. 2.  Klonopin 0.5 mg 1 to 2 tablets daily as needed for anxiety. 3.  Dextrostat 10 mg twice daily. 4.  Synthroid 100 mcg daily. 5.  Ativan 1 mg twice daily as needed. 6.  Aleve, dosage as directed. 7.  Zofran 4 mg every 8 hours as needed for nausea. 8.  Phenergan 25 mg every 6 hours as needed for nausea. 9.  Trazodone 150 mg daily at bedtime. 10.  Viibryd 40 mg daily. FAMILY HISTORY:  This was reviewed. Her mother had cancer, the type of cancer is not known. PAST SURGICAL HISTORY:  This is significant for:  1. Right lumpectomy. 2.  Thyroidectomy. 3.   section. 4.  Bilateral breast implant. SOCIAL HISTORY:  The patient smokes less than a pack of cigarettes daily. Admits to social consumption of alcohol 14 shots of liquor per week. REVIEW OF SYSTEMS:  HEAD, EYES, EARS, NOSE, AND THROAT:  This is positive for headache. No blurring or vision, no photophobia. RESPIRATORY SYSTEM:  This is positive for shortness of breath and cough. No hemoptysis. CARDIOVASCULAR SYSTEM:  This is for orthopnea. No chest pain, no palpitations. GASTROINTESTINAL SYSTEM:  No nausea or vomiting. No diarrhea, no constipation. GENITOURINARY SYSTEM:  No dysuria, no urgency, no frequency. All other systems are reviewed and they are negative. PHYSICAL EXAMINATION:  GENERAL APPEARANCE:  The patient appeared ill, in moderate distress. VITAL SIGNS:  On arrival at the emergency room; temperature 99.8, pulse 100, respiratory rate 24, blood pressure 151/77, oxygen saturation 90% on room air. HEENT:  Head:  Normocephalic, atraumatic. Eyes:  Normal eye movement. No redness, no drainage, no discharge. Ears:  Normal external ears with no evidence of drainage. Nose:  No deformity, no drainage. Mouth and Throat:  No visible oral lesion. NECK:  Neck is supple. Mild JVD. No thyromegaly.   CHEST:  Bilateral basilar crackles and a few expiratory wheezing. HEART:  Normal S1 and S2, regular. No clinically appreciable murmur. ABDOMEN:  Soft, nontender. Normal bowel sounds. CNS:  Alert and oriented x3. No gross focal neurological deficit. EXTREMITIES:  No edema. Pulses 2+ bilaterally. MUSCULOSKELETAL SYSTEM:  No obvious joint deformity or swelling. SKIN:  No active skin lesions seen in the exposed part of the body. PSYCHIATRY:  Normal mood and affect. LYMPHATIC SYSTEM:  No cervical lymphadenopathy. DIAGNOSTIC DATA:  EKG shows normal sinus rhythm. No significant ST or T-waves abnormalities. Chest x-ray, no acute cardiopulmonary process. LABORATORY DATA:  Influenza A antigen positive. Influenza B antigen negative. Pro-BNP level 1005. Cardiac profile:  Troponin less than 0.05. Chemistry:  Sodium 137, potassium 4.0, chloride 100, CO2 of 28, glucose 137, BUN 15, creatinine 0.76, calcium 8.5, total bilirubin 0.5, ALT 19, AST 18, alkaline phosphatase 46, total protein 7.1, albumin level 3.0, globulin 4.1. Hematology:  WBC 10.7, hemoglobin 10.4, hematocrit 32.3, platelets 241. ASSESSMENT:  1. Influenza A infection. 2.  Suspected acute congestive heart failure. 3.  Anemia. 4.  Hyperglycemia. 5.  Hypothyroidism. 6.  Dyslipidemia. 7.  Anxiety/depression. 8.  Tobacco abuse. PLAN:  1. Influenza A infection: We will admit the patient for further evaluation and treatment. We will start the patient on Tamiflu. The patient will be placed on droplet isolation precautions. This is contributing to the patient's shortness of breath. We will check serial cardiac markers to evaluate the patient for acute myocardial infarction as a possible cause of shortness of breath. The patient is also a smoker with history of breast cancer. CTA of the chest to evaluate the patient for mass lesion and pulmonary embolism was ordered for the patient, but the patient declined the tests. We will place the patient on supplemental oxygen.   2. Suspected acute congestive heart failure: We will treat the patient with one-time dose of Lasix. We will obtain echocardiogram to determine the ejection fraction and the type of congestive heart failure. Cardiology consult will be requested to assist in evaluation and treatment. We will check serial cardiac markers to rule out acute myocardial infarction as a possible cause of shortness of breath as stated above. 3.  Anemia: This is mild, most likely due to chronic disease. We will carry out anemia workup including checking a stool guaiac to rule out occult GI bleed. 4.  Hyperglycemia: We will check hemoglobin A1c level. The patient has no history of diabetes. 5.  Hypothyroidism: We will continue with Synthroid. We will check a TSH level. 6.  Dyslipidemia: We will resume home medication. We will check lipid profile. 7.  Anxiety/depression: We will continue with preadmission medication. 8.  Tobacco abuse: The patient advised to quit smoking. We will place the patient on Nicoderm patch. OTHER ISSUES:  Code status: The patient is a full code. We will place the patient on heparin for DVT prophylaxis. FUNCTIONAL STATUS PRIOR TO ADMISSION:  The patient came from home. The patient is ambulatory with no assistant or device.         MD TEODORA [email or web address]  D:  02/23/2020 5:46  T:  02/23/2020 7:18  JOB #:  [id number]  CC:  Christian Borden MD

## 2020-02-23 NOTE — ROUTINE PROCESS
TRANSFER - OUT REPORT: 
 
Verbal report given to Osiris Montanoiff on Jimenez Sarabia  being transferred to (unit) for routine progression of care Report consisted of patients Situation, Background, Assessment and  
Recommendations(SBAR). Information from the following report(s) SBAR, Kardex, Intake/Output, MAR and Recent Results was reviewed with the receiving nurse. Lines:  
Peripheral IV 02/22/20 Right Antecubital (Active) Site Assessment Clean, dry, & intact 2/23/2020  8:19 AM  
Phlebitis Assessment 0 2/23/2020  8:19 AM  
Infiltration Assessment 0 2/23/2020  8:19 AM  
Dressing Status Clean, dry, & intact 2/23/2020  8:19 AM  
Dressing Type Transparent;Tape 2/23/2020  8:19 AM  
Hub Color/Line Status Green;Capped 2/23/2020  8:19 AM  
  
 
Opportunity for questions and clarification was provided. Patient transported with: 
 O2 @ 1 liters Verified with Dr. Mandi Sullivan that drawing a lactic at this time in treatment is not necessary.

## 2020-02-23 NOTE — ED NOTES
TRANSFER - OUT REPORT:    Verbal report given to Marilu Humphreys RN (name) on Cody Sparrow  being transferred to [facility] (unit) for routine progression of care       Report consisted of patients Situation, Background, Assessment and   Recommendations(SBAR). Information from the following report(s) SBAR, ED Summary, Intake/Output, MAR and Recent Results was reviewed with the receiving nurse. Lines:   Peripheral IV 02/22/20 Right Antecubital (Active)   Site Assessment Clean, dry, & intact 2/22/2020  6:42 PM   Phlebitis Assessment 0 2/22/2020  6:42 PM   Dressing Status Clean, dry, & intact 2/22/2020  6:42 PM   Dressing Type Transparent 2/22/2020  6:42 PM        Opportunity for questions and clarification was provided.       Patient transported with:   O2 @ 4 liters   DRT team.

## 2020-02-23 NOTE — PROGRESS NOTES
TRANSFER - IN REPORT:    Verbal report received from [facility] (name) on Ludmila Atwood  being received from [facility]W (unit) for routine progression of care      Report consisted of patients Situation, Background, Assessment and   Recommendations(SBAR). Information from the following report(s) SBAR, Kardex, Intake/Output, MAR and Recent Results was reviewed with the receiving nurse. Opportunity for questions and clarification was provided. Assessment completed upon patients arrival to unit and care assumed.

## 2020-02-24 ENCOUNTER — APPOINTMENT (OUTPATIENT)
Dept: NON INVASIVE DIAGNOSTICS | Age: 70
DRG: 193 | End: 2020-02-24
Attending: INTERNAL MEDICINE
Payer: MEDICARE

## 2020-02-24 LAB
ANION GAP SERPL CALC-SCNC: 3 MMOL/L (ref 5–15)
BUN SERPL-MCNC: 16 MG/DL (ref 6–20)
BUN/CREAT SERPL: 23 (ref 12–20)
CALCIUM SERPL-MCNC: 8.6 MG/DL (ref 8.5–10.1)
CHLORIDE SERPL-SCNC: 106 MMOL/L (ref 97–108)
CO2 SERPL-SCNC: 32 MMOL/L (ref 21–32)
CREAT SERPL-MCNC: 0.71 MG/DL (ref 0.55–1.02)
ERYTHROCYTE [DISTWIDTH] IN BLOOD BY AUTOMATED COUNT: 13.5 % (ref 11.5–14.5)
GLUCOSE SERPL-MCNC: 95 MG/DL (ref 65–100)
HCT VFR BLD AUTO: 30.8 % (ref 35–47)
HGB BLD-MCNC: 9.5 G/DL (ref 11.5–16)
MCH RBC QN AUTO: 33.1 PG (ref 26–34)
MCHC RBC AUTO-ENTMCNC: 30.8 G/DL (ref 30–36.5)
MCV RBC AUTO: 107.3 FL (ref 80–99)
NRBC # BLD: 0 K/UL (ref 0–0.01)
NRBC BLD-RTO: 0 PER 100 WBC
PLATELET # BLD AUTO: 260 K/UL (ref 150–400)
PMV BLD AUTO: 10.1 FL (ref 8.9–12.9)
POTASSIUM SERPL-SCNC: 4 MMOL/L (ref 3.5–5.1)
RBC # BLD AUTO: 2.87 M/UL (ref 3.8–5.2)
SODIUM SERPL-SCNC: 141 MMOL/L (ref 136–145)
WBC # BLD AUTO: 7.2 K/UL (ref 3.6–11)

## 2020-02-24 PROCEDURE — 77010033678 HC OXYGEN DAILY

## 2020-02-24 PROCEDURE — 36415 COLL VENOUS BLD VENIPUNCTURE: CPT

## 2020-02-24 PROCEDURE — 94640 AIRWAY INHALATION TREATMENT: CPT

## 2020-02-24 PROCEDURE — 80048 BASIC METABOLIC PNL TOTAL CA: CPT

## 2020-02-24 PROCEDURE — 74011000250 HC RX REV CODE- 250: Performed by: PHYSICIAN ASSISTANT

## 2020-02-24 PROCEDURE — 74011250636 HC RX REV CODE- 250/636: Performed by: INTERNAL MEDICINE

## 2020-02-24 PROCEDURE — 74011000250 HC RX REV CODE- 250: Performed by: HOSPITALIST

## 2020-02-24 PROCEDURE — 74011250637 HC RX REV CODE- 250/637: Performed by: INTERNAL MEDICINE

## 2020-02-24 PROCEDURE — 93306 TTE W/DOPPLER COMPLETE: CPT

## 2020-02-24 PROCEDURE — 85027 COMPLETE CBC AUTOMATED: CPT

## 2020-02-24 PROCEDURE — 65660000000 HC RM CCU STEPDOWN

## 2020-02-24 PROCEDURE — 74011250637 HC RX REV CODE- 250/637: Performed by: NURSE PRACTITIONER

## 2020-02-24 PROCEDURE — 94762 N-INVAS EAR/PLS OXIMTRY CONT: CPT

## 2020-02-24 PROCEDURE — 74011000250 HC RX REV CODE- 250: Performed by: INTERNAL MEDICINE

## 2020-02-24 PROCEDURE — 74011250637 HC RX REV CODE- 250/637: Performed by: HOSPITALIST

## 2020-02-24 RX ORDER — IPRATROPIUM BROMIDE AND ALBUTEROL SULFATE 2.5; .5 MG/3ML; MG/3ML
3 SOLUTION RESPIRATORY (INHALATION)
Status: DISCONTINUED | OUTPATIENT
Start: 2020-02-24 | End: 2020-02-27

## 2020-02-24 RX ORDER — HEPARIN SODIUM 5000 [USP'U]/ML
5000 INJECTION, SOLUTION INTRAVENOUS; SUBCUTANEOUS EVERY 12 HOURS
Status: DISCONTINUED | OUTPATIENT
Start: 2020-02-25 | End: 2020-02-28 | Stop reason: HOSPADM

## 2020-02-24 RX ORDER — LORATADINE 10 MG/1
10 TABLET ORAL DAILY
Status: DISCONTINUED | OUTPATIENT
Start: 2020-02-24 | End: 2020-02-24

## 2020-02-24 RX ORDER — IPRATROPIUM BROMIDE AND ALBUTEROL SULFATE 2.5; .5 MG/3ML; MG/3ML
3 SOLUTION RESPIRATORY (INHALATION) 4 TIMES DAILY
Status: DISCONTINUED | OUTPATIENT
Start: 2020-02-24 | End: 2020-02-24

## 2020-02-24 RX ORDER — DIPHENHYDRAMINE HCL 25 MG
25 CAPSULE ORAL
Status: DISCONTINUED | OUTPATIENT
Start: 2020-02-24 | End: 2020-02-28 | Stop reason: HOSPADM

## 2020-02-24 RX ADMIN — Medication 10 ML: at 00:00

## 2020-02-24 RX ADMIN — OSELTAMIVIR PHOSPHATE 30 MG: 30 CAPSULE ORAL at 18:56

## 2020-02-24 RX ADMIN — LEVOTHYROXINE SODIUM 100 MCG: 0.1 TABLET ORAL at 08:22

## 2020-02-24 RX ADMIN — IPRATROPIUM BROMIDE AND ALBUTEROL SULFATE 3 ML: .5; 3 SOLUTION RESPIRATORY (INHALATION) at 22:33

## 2020-02-24 RX ADMIN — ACETAMINOPHEN 650 MG: 325 TABLET ORAL at 04:32

## 2020-02-24 RX ADMIN — ACETAMINOPHEN 650 MG: 325 TABLET ORAL at 19:00

## 2020-02-24 RX ADMIN — GUAIFENESIN 600 MG: 600 TABLET, EXTENDED RELEASE ORAL at 08:42

## 2020-02-24 RX ADMIN — ACETAMINOPHEN 650 MG: 325 TABLET ORAL at 23:12

## 2020-02-24 RX ADMIN — IPRATROPIUM BROMIDE AND ALBUTEROL SULFATE 3 ML: .5; 3 SOLUTION RESPIRATORY (INHALATION) at 01:15

## 2020-02-24 RX ADMIN — IPRATROPIUM BROMIDE AND ALBUTEROL SULFATE 3 ML: .5; 3 SOLUTION RESPIRATORY (INHALATION) at 11:28

## 2020-02-24 RX ADMIN — IPRATROPIUM BROMIDE AND ALBUTEROL SULFATE 3 ML: .5; 3 SOLUTION RESPIRATORY (INHALATION) at 15:35

## 2020-02-24 RX ADMIN — GUAIFENESIN 600 MG: 600 TABLET, EXTENDED RELEASE ORAL at 23:32

## 2020-02-24 RX ADMIN — CLONAZEPAM 0.5 MG: 1 TABLET ORAL at 12:40

## 2020-02-24 RX ADMIN — IPRATROPIUM BROMIDE AND ALBUTEROL SULFATE 3 ML: .5; 3 SOLUTION RESPIRATORY (INHALATION) at 07:33

## 2020-02-24 RX ADMIN — Medication 1 CAPSULE: at 08:42

## 2020-02-24 RX ADMIN — AZITHROMYCIN MONOHYDRATE 500 MG: 250 TABLET ORAL at 08:42

## 2020-02-24 RX ADMIN — Medication 10 ML: at 06:21

## 2020-02-24 RX ADMIN — OSELTAMIVIR PHOSPHATE 30 MG: 30 CAPSULE ORAL at 09:01

## 2020-02-24 RX ADMIN — Medication 10 ML: at 23:19

## 2020-02-24 RX ADMIN — HEPARIN SODIUM 5000 UNITS: 5000 INJECTION INTRAVENOUS; SUBCUTANEOUS at 23:18

## 2020-02-24 RX ADMIN — ACETAMINOPHEN 650 MG: 325 TABLET ORAL at 12:40

## 2020-02-24 NOTE — PROGRESS NOTES
Primary Nurse Oliva Antonio and Cathy Gilman RN performed a dual skin assessment on this patient. No impairment noted. Dennis score is 23.

## 2020-02-24 NOTE — PROGRESS NOTES
Cardiovascular Progress Note    Patient: Jimenez Sarabia MRN: [id number]  SSN: [id number]    YOB: 1950  Age: 81 y.o. Sex: female       Subjective:   Still with cough. Perturbed she is being woke for Nebs. Date of  Admission: 2020   Primary Care Provider: Aidan Richards MD  Admission type:   Chief Complaint   Patient presents with    Flu Like Symptoms         Jimenez Sarabia is a 81 y.o.  female admitted for Acute respiratory failure with hypoxia ([facility] [address] [J96.01]; Influenza A [J10.1]; Wheezing [R06.2]. Patient complains of dyspnea. This consultation was requested by Dr. Nathalia Ibrahim. Ms. Chris De Jesus reports two days of shortness of breath, productive cough, sinus congestion and inability to breathe through nose. No chest pain. No edema orthopnea or PND. No syncope or pre-syncope. She has been diagnosed with Influenza A. BNP was elevated and we are asked to see. She denies any history of cardiac problems or a family history of heart problems. Refusing CT scan. Adjusting oxygen in room and requesting home oxygen.  History of hypothyroidism, anxiety/depression, alcohol and tobacco abuse      Past Medical History:   Diagnosis Date    Anemia     Anxiety     Breast cancer ([facility] 2005    right lumpectomy    Chronic pain     neck pain    Cough     Depression     Hemorrhoid     Menopause 2005    Muscle pain     Neck problem     Pneumonia     sepis    S/P radiation therapy 2005    Sleep trouble     Thyroid disease     Urine troubles     unable to controll      Past Surgical History:   Procedure Laterality Date    HX BREAST LUMPECTOMY Right 2005    HX  SECTION      HX THYROIDECTOMY      IMPLANT BREAST SILICONE/EQ Bilateral 9974     Family History   Problem Relation Age of Onset    Cancer Mother       Social History     Tobacco Use    Smoking status: Light Tobacco Smoker     Last attempt to quit: 2010     Years since quitting: 10.1    Smokeless tobacco: Never Used   Substance Use Topics    Alcohol use: Yes     Alcohol/week: 14.0 standard drinks     Types: 14 Shots of liquor per week     Frequency: 4 or more times a week     Drinks per session: 10 or more      Current Facility-Administered Medications   Medication Dose Route Frequency    loratadine (CLARITIN) tablet 10 mg  10 mg Oral DAILY    albuterol-ipratropium (DUO-NEB) 2.5 MG-0.5 MG/3 ML  3 mL Nebulization QID    albuterol-ipratropium (DUO-NEB) 2.5 MG-0.5 MG/3 ML  3 mL Nebulization Q4H PRN    lactobac ac& pc-s.therm-b.anim (ALEXA Q/RISAQUAD)  1 Cap Oral DAILY    clonazePAM (KlonoPIN) tablet 0.5 mg  0.5 mg Oral DAILY PRN    levothyroxine (SYNTHROID) tablet 100 mcg  100 mcg Oral ACB    [Held by provider] traZODone (DESYREL) tablet 150 mg  150 mg Oral QHS    vilazodone (VIIBRYD) tablet 40 mg  40 mg Oral DAILY    sodium chloride (NS) flush 5-40 mL  5-40 mL IntraVENous Q8H    sodium chloride (NS) flush 5-40 mL  5-40 mL IntraVENous PRN    acetaminophen (TYLENOL) tablet 650 mg  650 mg Oral Q4H PRN    ondansetron (ZOFRAN) injection 4 mg  4 mg IntraVENous Q4H PRN    bisacodyL (DULCOLAX) tablet 5 mg  5 mg Oral DAILY PRN    nicotine (NICODERM CQ) 21 mg/24 hr patch 1 Patch  1 Patch TransDERmal Q24H    heparin (porcine) injection 5,000 Units  5,000 Units SubCUTAneous Q8H    guaiFENesin ER (MUCINEX) tablet 600 mg  600 mg Oral Q12H    azithromycin (ZITHROMAX) tablet 500 mg  500 mg Oral DAILY    oseltamivir (TAMIFLU) capsule 30 mg  30 mg Oral BID    sodium chloride (NS) flush 5-10 mL  5-10 mL IntraVENous PRN    sodium chloride (NS) flush 5-40 mL  5-40 mL IntraVENous Q8H    sodium chloride (NS) flush 5-40 mL  5-40 mL IntraVENous PRN        No Known Allergies     Review of Symptoms:  Constitutional: Fevers,no chills. HEET: No trauma. No visual changes. No hearing loss. sore throat. nasal congestion. Neck: No adenopathy or swelling. Heart: No chest pain or palpitations.   Lungs: shortness of breath  Abdomen: No pain. No nausea of vomiting. No BRBPR or melena. No diarrhea. Urology: No dysuria or hematuria. Ext: No edema. Skin: No acute rashes or ulcers. Endocrine: No heat or cold intolerance. Neuro: No transient neurologic deficits. Subjective:     Visit Vitals  /44   Pulse 100   Temp 98.3 °F (36.8 °C)   Resp 20   Ht 5' 2\" (1.575 m)   Wt 115 lb 8.3 oz (52.4 kg)   SpO2 95%   BMI 21.13 kg/m²     Physical Exam:  Head: Normocephalic, atraumatic. Eyes: Pupils equal, round, reactive to light and accomodation. , Extra ocular muscles intact. Sclera anicteric. Ears: Grossly responsive to sound. Heart: Regular rate and rhythm. Normal S1 and S2. No murmurs, gallops, or rub. Lungs: EEW bilaterally  Abdomen: Soft, non-tender. Bowel sounds active. Ext: No edema. No ulceration. Skin: Normal coloration. Warm and dry. No rash. Neuro: Cranial nerves II through XII intact. Motor and sensory grossly intact. Affect: Interactive.  Flat      Cardiographics:  ECG:  EKG Results     Procedure 720 Value Units Date/Time    EKG, 12 LEAD, INITIAL [[id number]] Collected:  02/22/20 1850    Order Status:  Completed Updated:  02/23/20 1734     Ventricular Rate 88 BPM      Atrial Rate 88 BPM      P-R Interval 140 ms      QRS Duration 76 ms      Q-T Interval 364 ms      QTC Calculation (Bezet) 440 ms      Calculated P Axis 83 degrees      Calculated R Axis 51 degrees      Calculated T Axis 71 degrees      Diagnosis --     Normal sinus rhythm  Normal ECG  No previous ECGs available  Confirmed by Chloe Mcdowell MD., --- ([id number]) on 2/23/2020 5:33:53 PM      EKG 12 LEAD INITIAL [[id number]]     Order Status:  Completed           Data Reviewed: CMP:   Lab Results   Component Value Date/Time     02/24/2020 05:04 AM    K 4.0 02/24/2020 05:04 AM     02/24/2020 05:04 AM    CO2 32 02/24/2020 05:04 AM    AGAP 3 (L) 02/24/2020 05:04 AM    GLU 95 02/24/2020 05:04 AM    BUN 16 02/24/2020 05:04 AM    CREA 0.71 02/24/2020 05:04 AM    GFRAA >60 02/24/2020 05:04 AM    GFRNA >60 02/24/2020 05:04 AM    CA 8.6 02/24/2020 05:04 AM     CBC:   Lab Results   Component Value Date/Time    WBC 7.2 02/24/2020 05:04 AM    HGB 9.5 (L) 02/24/2020 05:04 AM    HCT 30.8 (L) 02/24/2020 05:04 AM     02/24/2020 05:04 AM     All Cardiac Markers in the last 24 hours:   Lab Results   Component Value Date/Time    TROIQ <0.05 02/23/2020 11:10 AM     ABG: No results found for: PH, PHI, PCO2, PCO2I, PO2, PO2I, HCO3, HCO3I, FIO2, FIO2I  COAGS: No results found for: APTT, PTP, INR, Fraser Aw, INREXT     Assessment:         Hospital Problems  Date Reviewed: 2/23/2020          Codes Class Noted POA    Wheezing ICD-10-CM: R06.2  ICD-9-CM: 786.07  2/22/2020 Unknown        * (Principal) Influenza A ICD-10-CM: J10.1  ICD-9-CM: 487.1  2/22/2020 Yes        Acute respiratory failure with hypoxia [facility]) ICD-10-CM: J96.01  ICD-9-CM: 518.81  2/22/2020 Unknown               Plan:     Echo pending. Continue supportive care for Flu. Likelihood of HF is very low. No edema on exam, CXR or crackles. Trivial elevation of proBNP above cutoff for age related to cardiac injury with influenza. Needs no further cardiac testing. Favor early discharge. She can follow up in office for her echo, if needed.       KAPIL Bhakta MD  2/24/2020    [facility] Office:  [address]   [address] [address]  [facility], [address]  P: [phone]  F: [facility] [place] Office:  [address] [facility] [address]  [address] [facility],[address]  [address]  P: [phone]  F: [phone]

## 2020-02-24 NOTE — PROGRESS NOTES
Interventional Cardiology Progress Note    Name: Tana Abel  : 1950  MRN: [id number]  Date: 2020    Echocardiogram personally reviewed. Normal LV systolic function. Mild diastolic dysfunction. No significant valvular heart disease. No further cardiac evaluation at this time. Happy to follow up in clinic if she desires.       Pia Barker MD  [phone]  20  2:28 PM

## 2020-02-25 LAB
AV VELOCITY RATIO: 0.86
ECHO AO ROOT DIAM: 3.04 CM
ECHO AV AREA PEAK VELOCITY: 1.9 CM2
ECHO AV PEAK GRADIENT: 12 MMHG
ECHO AV PEAK VELOCITY: 172.87 CM/S
ECHO IVC SNIFF: 1.65 CM
ECHO LA MAJOR AXIS: 4.09 CM
ECHO LA TO AORTIC ROOT RATIO: 1.35
ECHO LV E' LATERAL VELOCITY: 9.87 CM/S
ECHO LV E' SEPTAL VELOCITY: 10.05 CM/S
ECHO LV INTERNAL DIMENSION DIASTOLIC: 4.63 CM (ref 3.9–5.3)
ECHO LV INTERNAL DIMENSION SYSTOLIC: 3 CM
ECHO LV IVSD: 0.52 CM (ref 0.6–0.9)
ECHO LV MASS 2D: 98.7 G (ref 67–162)
ECHO LV MASS INDEX 2D: 65.3 G/M2 (ref 43–95)
ECHO LV POSTERIOR WALL DIASTOLIC: 0.77 CM (ref 0.6–0.9)
ECHO LVOT DIAM: 1.69 CM
ECHO LVOT PEAK GRADIENT: 8.9 MMHG
ECHO LVOT PEAK VELOCITY: 149 CM/S
ECHO MV A VELOCITY: 113.73 CM/S
ECHO MV AREA PHT: 3.9 CM2
ECHO MV E DECELERATION TIME (DT): 195.7 MS
ECHO MV E VELOCITY: 73.44 CM/S
ECHO MV E/A RATIO: 0.65
ECHO MV E/E' LATERAL: 7.44
ECHO MV E/E' RATIO (AVERAGED): 7.37
ECHO MV E/E' SEPTAL: 7.31
ECHO MV PRESSURE HALF TIME (PHT): 56.7 MS
ECHO PV MAX VELOCITY: 121.33 CM/S
ECHO PV PEAK GRADIENT: 5.9 MMHG
ECHO RV TAPSE: 1.76 CM (ref 1.5–2)
LVFS 2D: 35.19 %
MV DEC SLOPE: 3.75

## 2020-02-25 PROCEDURE — 74011250637 HC RX REV CODE- 250/637: Performed by: HOSPITALIST

## 2020-02-25 PROCEDURE — 74011250637 HC RX REV CODE- 250/637: Performed by: INTERNAL MEDICINE

## 2020-02-25 PROCEDURE — 74011000250 HC RX REV CODE- 250: Performed by: HOSPITALIST

## 2020-02-25 PROCEDURE — 74011250637 HC RX REV CODE- 250/637: Performed by: NURSE PRACTITIONER

## 2020-02-25 PROCEDURE — 77010033678 HC OXYGEN DAILY

## 2020-02-25 PROCEDURE — 94760 N-INVAS EAR/PLS OXIMETRY 1: CPT

## 2020-02-25 PROCEDURE — 74011250636 HC RX REV CODE- 250/636: Performed by: HOSPITALIST

## 2020-02-25 PROCEDURE — 74011250636 HC RX REV CODE- 250/636: Performed by: INTERNAL MEDICINE

## 2020-02-25 PROCEDURE — 74011000250 HC RX REV CODE- 250: Performed by: INTERNAL MEDICINE

## 2020-02-25 PROCEDURE — 65270000029 HC RM PRIVATE

## 2020-02-25 PROCEDURE — 94640 AIRWAY INHALATION TREATMENT: CPT

## 2020-02-25 RX ORDER — ACETYLCYSTEINE 200 MG/ML
400 SOLUTION ORAL; RESPIRATORY (INHALATION) 2 TIMES DAILY
Status: DISCONTINUED | OUTPATIENT
Start: 2020-02-25 | End: 2020-02-26

## 2020-02-25 RX ORDER — SODIUM CHLORIDE 9 MG/ML
75 INJECTION, SOLUTION INTRAVENOUS CONTINUOUS
Status: DISPENSED | OUTPATIENT
Start: 2020-02-25 | End: 2020-02-25

## 2020-02-25 RX ADMIN — IPRATROPIUM BROMIDE AND ALBUTEROL SULFATE 3 ML: .5; 3 SOLUTION RESPIRATORY (INHALATION) at 20:50

## 2020-02-25 RX ADMIN — Medication 1 CAPSULE: at 11:49

## 2020-02-25 RX ADMIN — IPRATROPIUM BROMIDE AND ALBUTEROL SULFATE 3 ML: .5; 3 SOLUTION RESPIRATORY (INHALATION) at 16:01

## 2020-02-25 RX ADMIN — Medication 10 ML: at 14:00

## 2020-02-25 RX ADMIN — SODIUM CHLORIDE 75 ML/HR: 900 INJECTION, SOLUTION INTRAVENOUS at 11:00

## 2020-02-25 RX ADMIN — OSELTAMIVIR PHOSPHATE 30 MG: 30 CAPSULE ORAL at 11:49

## 2020-02-25 RX ADMIN — AZITHROMYCIN MONOHYDRATE 500 MG: 250 TABLET ORAL at 11:59

## 2020-02-25 RX ADMIN — HEPARIN SODIUM 5000 UNITS: 5000 INJECTION INTRAVENOUS; SUBCUTANEOUS at 11:49

## 2020-02-25 RX ADMIN — ACETYLCYSTEINE 400 MG: 200 SOLUTION ORAL; RESPIRATORY (INHALATION) at 12:07

## 2020-02-25 RX ADMIN — IPRATROPIUM BROMIDE AND ALBUTEROL SULFATE 3 ML: .5; 3 SOLUTION RESPIRATORY (INHALATION) at 07:51

## 2020-02-25 RX ADMIN — OSELTAMIVIR PHOSPHATE 30 MG: 30 CAPSULE ORAL at 18:37

## 2020-02-25 RX ADMIN — HEPARIN SODIUM 5000 UNITS: 5000 INJECTION INTRAVENOUS; SUBCUTANEOUS at 23:32

## 2020-02-25 RX ADMIN — ACETAMINOPHEN 650 MG: 325 TABLET ORAL at 07:07

## 2020-02-25 RX ADMIN — LEVOTHYROXINE SODIUM 100 MCG: 0.1 TABLET ORAL at 07:07

## 2020-02-25 RX ADMIN — ACETAMINOPHEN 650 MG: 325 TABLET ORAL at 15:39

## 2020-02-25 RX ADMIN — ACETYLCYSTEINE 400 MG: 200 SOLUTION ORAL; RESPIRATORY (INHALATION) at 16:01

## 2020-02-25 RX ADMIN — GUAIFENESIN 600 MG: 600 TABLET, EXTENDED RELEASE ORAL at 11:49

## 2020-02-25 RX ADMIN — GUAIFENESIN 600 MG: 600 TABLET, EXTENDED RELEASE ORAL at 20:48

## 2020-02-25 RX ADMIN — CLONAZEPAM 0.5 MG: 1 TABLET ORAL at 20:48

## 2020-02-25 RX ADMIN — IPRATROPIUM BROMIDE AND ALBUTEROL SULFATE 3 ML: .5; 3 SOLUTION RESPIRATORY (INHALATION) at 12:07

## 2020-02-25 NOTE — PROGRESS NOTES
[address] [facility] Adult  Hospitalist Group                                                                                          Hospitalist Progress Note  Jessa Covarrubias MD  Answering service: [phone] from in house phone        Date of Service:  2020  NAME:  Siena Beckford  :  1950  MRN:  [id number]      Admission Summary: This is a 61-year-old woman with past medical history significant for hypothyroidism, dyslipidemia, anxiety/depression, and right breast cancer, who was in her usual state of health until about a week ago when the patient developed nasal congestion. But in the last  24 to 48 hours, the nasal congestion became associated with shortness of breath. The shortness of breath is progressive and getting worse associated with cough which is productive of yellowish sputum. The patient also complained of headache. The headache is diffuse in location, not throbbing. The patient described the headache as mild and 4/10 in severity. The patient went to [facility] Room at [facility] for further evaluation. When the patient arrived at the emergency room, the patient was found to be influenza A antigen positive. The patient also has elevated BNP level. The patient has no history of asthma, no history of congestive heart failure. The patient was referred to the hospitalist service for evaluation for admission. The patient also stated that she was recently at [place] [address] and she is taking part in experimental treatment for depression. The patient has no fever, no rigors, no chills. Interval history / Subjective: Follow up Influenza A + infection.  -She still has a lot of cough, she says she brings up greenish material.  Afebrile. Still on oxygen via nasal cannula.        Assessment & Plan:     # Influenza A bronchitis  -Tamiflu and antibiotics for secondary infection  -Supportive care    # Acute hypoxic respiratory failure  -On oxygen via nasal cannula, wean as able to room air    # Elevated BNP  -No evidence of CHF. Echocardiogram with normal EF. Cardiology signed off. # Hyperglycemia, not a DM pt, likely due to stress of acute illness. -A1c 5.6    Hypothyroidism  -Cont symthroid    # HDL  -Home medication    # Smoking:  -Cessation counseling    #Mild macrocytic anemia. Vitamin B12 and folate are normal.  Recommend outpatient follow-up    Code status: Full code  DVT prophylaxis: Heparin SQ    Care Plan discussed with: Patient/Family and Nurse  Anticipated Disposition: Home w/Family  Anticipated Discharge: 24 hours to 48 hours     Hospital Problems  Date Reviewed: 2/23/2020          Codes Class Noted POA    Wheezing ICD-10-CM: R06.2  ICD-9-CM: 786.07  2/22/2020 Unknown        * (Principal) Influenza A ICD-10-CM: J10.1  ICD-9-CM: 487.1  2/22/2020 Yes        Acute respiratory failure with hypoxia [facility]) ICD-10-CM: J96.01  ICD-9-CM: 518.81  2/22/2020 Unknown                Review of Systems:   A comprehensive review of systems was negative except for that written in the HPI. Vital Signs:    Last 24hrs VS reviewed since prior progress note. Most recent are:  Visit Vitals  /59   Pulse 93   Temp 98.1 °F (36.7 °C)   Resp 16   Ht 5' 2\" (1.575 m)   Wt 52.2 kg (115 lb)   SpO2 92%   BMI 21.03 kg/m²       No intake or output data in the 24 hours ending 02/25/20 1727     Physical Examination:             Constitutional:  No acute distress, cooperative, pleasant    ENT:  Oral mucosa moist, oropharynx benign. Resp:  On oxygen via nasal cannula. CTA bilaterally. No wheezing/rhonchi/rales. No accessory muscle use   CV:  Regular rhythm, normal rate, no murmurs, gallops, rubs    GI:  Soft, non distended, non tender. normoactive bowel sounds, no hepatosplenomegaly     Musculoskeletal:  No peripheral edema. Neurologic:  Moves all extremities. AAOx3, CN II-XII reviewed     Psych:  Good insight, Not anxious nor agitated.   Skin:  Good turgor, no rashes or ulcers  Hematologic/Lymphatic/Immunlogic:  No jaundice nor lymph node swelling  Eyes:  EOMI. Anicteric sclerae, PERRL. Data Review:    Review and/or order of clinical lab test  Review and/or order of tests in the radiology section of CPT  Review and/or order of tests in the medicine section of CPT    Xr Chest Pa Lat    Result Date: 2/22/2020  IMPRESSION: No acute cardiopulmonary process      Labs:     Recent Labs     02/24/20  0504 02/23/20  0529   WBC 7.2 8.0   HGB 9.5* 9.9*   HCT 30.8* 31.6*    265     Recent Labs     02/24/20  0504 02/23/20  0529 02/22/20  1837    137 137   K 4.0 4.0 4.0    105 100   CO2 32 27 28   BUN 16 18 15   CREA 0.71 0.89 0.76   GLU 95 129* 137*   CA 8.6 8.4* 8.5   MG  --  2.3  --    PHOS  --  3.4  --      Recent Labs     02/23/20  0529 02/22/20  1837   SGOT 16 18   ALT 14 19   AP 42* 46   TBILI 0.2 0.5   TP 6.7 7.1   ALB 2.9* 3.0*   GLOB 3.8 4.1*     No results for input(s): INR, PTP, APTT, INREXT, INREXT in the last 72 hours. Recent Labs     02/23/20  1110 02/23/20  0529   TIBC 256  --    PSAT 14*  --    FERR  --  130      Lab Results   Component Value Date/Time    Folate 23.1 (H) 02/23/2020 05:34 AM      No results for input(s): PH, PCO2, PO2 in the last 72 hours.   Recent Labs     02/23/20  1110 02/23/20  0529 02/22/20  1837   TROIQ <0.05 <0.05 <0.05     Lab Results   Component Value Date/Time    Cholesterol, total 132 02/23/2020 05:29 AM    HDL Cholesterol 61 02/23/2020 05:29 AM    LDL, calculated 61.8 02/23/2020 05:29 AM    Triglyceride 46 02/23/2020 05:29 AM    CHOL/HDL Ratio 2.2 02/23/2020 05:29 AM     No results found for: Anabella Estrada  Lab Results   Component Value Date/Time    Color YELLOW/STRAW 02/23/2020 10:58 AM    Appearance CLEAR 02/23/2020 10:58 AM    Specific gravity <1.005 02/23/2020 10:58 AM    pH (UA) 6.5 02/23/2020 10:58 AM    Protein NEGATIVE  02/23/2020 10:58 AM    Glucose NEGATIVE  02/23/2020 10:58 AM    Ketone NEGATIVE  02/23/2020 10:58 AM Bilirubin NEGATIVE  02/23/2020 10:58 AM    Urobilinogen 0.2 02/23/2020 10:58 AM    Nitrites NEGATIVE  02/23/2020 10:58 AM    Leukocyte Esterase NEGATIVE  02/23/2020 10:58 AM    Epithelial cells FEW 02/23/2020 10:58 AM    Bacteria NEGATIVE  02/23/2020 10:58 AM    WBC 0-4 02/23/2020 10:58 AM    RBC 0-5 02/23/2020 10:58 AM         Medications Reviewed:     Current Facility-Administered Medications   Medication Dose Route Frequency    0.9% sodium chloride infusion  75 mL/hr IntraVENous CONTINUOUS    acetylcysteine (MUCOMYST) 200 mg/mL (20 %) solution 400 mg  400 mg Nebulization BID    diphenhydrAMINE (BENADRYL) capsule 25 mg  25 mg Oral TID PRN    heparin (porcine) injection 5,000 Units  5,000 Units SubCUTAneous Q12H    albuterol-ipratropium (DUO-NEB) 2.5 MG-0.5 MG/3 ML  3 mL Nebulization QID RT    albuterol-ipratropium (DUO-NEB) 2.5 MG-0.5 MG/3 ML  3 mL Nebulization Q4H PRN    lactobac ac& pc-s.therm-b.anim (ALEXA Q/RISAQUAD)  1 Cap Oral DAILY    clonazePAM (KlonoPIN) tablet 0.5 mg  0.5 mg Oral DAILY PRN    levothyroxine (SYNTHROID) tablet 100 mcg  100 mcg Oral ACB    [Held by provider] traZODone (DESYREL) tablet 150 mg  150 mg Oral QHS    vilazodone (VIIBRYD) tablet 40 mg  40 mg Oral DAILY    sodium chloride (NS) flush 5-40 mL  5-40 mL IntraVENous Q8H    sodium chloride (NS) flush 5-40 mL  5-40 mL IntraVENous PRN    acetaminophen (TYLENOL) tablet 650 mg  650 mg Oral Q4H PRN    ondansetron (ZOFRAN) injection 4 mg  4 mg IntraVENous Q4H PRN    bisacodyL (DULCOLAX) tablet 5 mg  5 mg Oral DAILY PRN    nicotine (NICODERM CQ) 21 mg/24 hr patch 1 Patch  1 Patch TransDERmal Q24H    guaiFENesin ER (MUCINEX) tablet 600 mg  600 mg Oral Q12H    azithromycin (ZITHROMAX) tablet 500 mg  500 mg Oral DAILY    oseltamivir (TAMIFLU) capsule 30 mg  30 mg Oral BID    sodium chloride (NS) flush 5-10 mL  5-10 mL IntraVENous PRN    sodium chloride (NS) flush 5-40 mL  5-40 mL IntraVENous Q8H    sodium chloride (NS) flush 5-40 mL  5-40 mL IntraVENous PRN     ______________________________________________________________________  EXPECTED LENGTH OF STAY: 4d 4h  ACTUAL LENGTH OF STAY:          3                 David Adams MD

## 2020-02-25 NOTE — PROGRESS NOTES
[address] [facility] Adult  Hospitalist Group                                                                                          Hospitalist Progress Note  Jordi Asher MD  Answering service: [phone] from in house phone        Date of Service:  2020  NAME:  Ibis Mohan  :  1950  MRN:  [id number]      Admission Summary: This is a 21-year-old woman with past medical history significant for hypothyroidism, dyslipidemia, anxiety/depression, and right breast cancer, who was in her usual state of health until about a week ago when the patient developed nasal congestion. But in the last  24 to 48 hours, the nasal congestion became associated with shortness of breath. The shortness of breath is progressive and getting worse associated with cough which is productive of yellowish sputum. The patient also complained of headache. The headache is diffuse in location, not throbbing. The patient described the headache as mild and 4/10 in severity. The patient went to [facility] Room at [facility] for further evaluation. When the patient arrived at the emergency room, the patient was found to be influenza A antigen positive. The patient also has elevated BNP level. The patient has no history of asthma, no history of congestive heart failure. The patient was referred to the hospitalist service for evaluation for admission. The patient also stated that she was recently at [place] [address] and she is taking part in experimental treatment for depression. The patient has no fever, no rigors, no chills. Interval history / Subjective: Follow up Influenza A + infection. Patient seen and examined at the bedside. Labs, images and notes reviewed  Discussed with nursing staff, orders reviewed.  Plan discussed with patient/Family       Assessment & Plan:     # Influenza A bronchitis  -Tamiflu and antibiotics for secondary infection  -Supportive care    # Acute hypoxic respiratory failure, on 3LPM O2 NC  -Supportive care    # Elevated BNP  -Cardiology on board  -Likely from above vs CHF onset and exacerbation  -I/o, daily weights  -Echo    # Hyperglycemia, not a DM pt  -Follow A1C  -Cont SSI    Hypothyroidism  -Cont symthroid    # HDL  -Home medication    # Smoking:  -Cessation counseling    Code status: Full code  DVT prophylaxis: Heparin SQ    Care Plan discussed with: Patient/Family and Nurse  Anticipated Disposition: Home w/Family  Anticipated Discharge: 24 hours to 48 hours     Hospital Problems  Date Reviewed: 2/23/2020          Codes Class Noted POA    Wheezing ICD-10-CM: R06.2  ICD-9-CM: 786.07  2/22/2020 Unknown        * (Principal) Influenza A ICD-10-CM: J10.1  ICD-9-CM: 487.1  2/22/2020 Yes        Acute respiratory failure with hypoxia [facility]) ICD-10-CM: J96.01  ICD-9-CM: 518.81  2/22/2020 Unknown                Review of Systems:   A comprehensive review of systems was negative except for that written in the HPI. Vital Signs:    Last 24hrs VS reviewed since prior progress note. Most recent are:  Visit Vitals  /43   Pulse 100   Temp 98.3 °F (36.8 °C)   Resp 20   Ht 5' 2\" (1.575 m)   Wt 52.2 kg (115 lb)   SpO2 96%   BMI 21.03 kg/m²       No intake or output data in the 24 hours ending 02/24/20 2008     Physical Examination:             Constitutional:  No acute distress, cooperative, pleasant    ENT:  Oral mucosa moist, oropharynx benign. Resp:  CTA bilaterally. No wheezing/rhonchi/rales. No accessory muscle use   CV:  Regular rhythm, normal rate, no murmurs, gallops, rubs    GI:  Soft, non distended, non tender. normoactive bowel sounds, no hepatosplenomegaly     Musculoskeletal:  No edema, warm, 2+ pulses throughout    Neurologic:  Moves all extremities. AAOx3, CN II-XII reviewed     Psych:  Good insight, Not anxious nor agitated. Skin:  Good turgor, no rashes or ulcers  Hematologic/Lymphatic/Immunlogic:  No jaundice nor lymph node swelling  Eyes:  EOMI. Anicteric sclerae, PERRL. Data Review:    Review and/or order of clinical lab test  Review and/or order of tests in the radiology section of CPT  Review and/or order of tests in the medicine section of CPT    Xr Chest Pa Lat    Result Date: 2/22/2020  IMPRESSION: No acute cardiopulmonary process      Labs:     Recent Labs     02/24/20  0504 02/23/20  0529   WBC 7.2 8.0   HGB 9.5* 9.9*   HCT 30.8* 31.6*    265     Recent Labs     02/24/20  0504 02/23/20  0529 02/22/20  1837    137 137   K 4.0 4.0 4.0    105 100   CO2 32 27 28   BUN 16 18 15   CREA 0.71 0.89 0.76   GLU 95 129* 137*   CA 8.6 8.4* 8.5   MG  --  2.3  --    PHOS  --  3.4  --      Recent Labs     02/23/20  0529 02/22/20  1837   SGOT 16 18   ALT 14 19   AP 42* 46   TBILI 0.2 0.5   TP 6.7 7.1   ALB 2.9* 3.0*   GLOB 3.8 4.1*     No results for input(s): INR, PTP, APTT, INREXT in the last 72 hours. Recent Labs     02/23/20  1110 02/23/20  0529   TIBC 256  --    PSAT 14*  --    FERR  --  130      Lab Results   Component Value Date/Time    Folate 23.1 (H) 02/23/2020 05:34 AM      No results for input(s): PH, PCO2, PO2 in the last 72 hours.   Recent Labs     02/23/20  1110 02/23/20  0529 02/22/20  1837   TROIQ <0.05 <0.05 <0.05     Lab Results   Component Value Date/Time    Cholesterol, total 132 02/23/2020 05:29 AM    HDL Cholesterol 61 02/23/2020 05:29 AM    LDL, calculated 61.8 02/23/2020 05:29 AM    Triglyceride 46 02/23/2020 05:29 AM    CHOL/HDL Ratio 2.2 02/23/2020 05:29 AM     No results found for: [facility]  Lab Results   Component Value Date/Time    Color YELLOW/STRAW 02/23/2020 10:58 AM    Appearance CLEAR 02/23/2020 10:58 AM    Specific gravity <1.005 02/23/2020 10:58 AM    pH (UA) 6.5 02/23/2020 10:58 AM    Protein NEGATIVE  02/23/2020 10:58 AM    Glucose NEGATIVE  02/23/2020 10:58 AM    Ketone NEGATIVE  02/23/2020 10:58 AM    Bilirubin NEGATIVE  02/23/2020 10:58 AM    Urobilinogen 0.2 02/23/2020 10:58 AM    Nitrites NEGATIVE 02/23/2020 10:58 AM    Leukocyte Esterase NEGATIVE  02/23/2020 10:58 AM    Epithelial cells FEW 02/23/2020 10:58 AM    Bacteria NEGATIVE  02/23/2020 10:58 AM    WBC 0-4 02/23/2020 10:58 AM    RBC 0-5 02/23/2020 10:58 AM         Medications Reviewed:     Current Facility-Administered Medications   Medication Dose Route Frequency    diphenhydrAMINE (BENADRYL) capsule 25 mg  25 mg Oral TID PRN    [START ON 2/25/2020] heparin (porcine) injection 5,000 Units  5,000 Units SubCUTAneous Q12H    albuterol-ipratropium (DUO-NEB) 2.5 MG-0.5 MG/3 ML  3 mL Nebulization QID RT    albuterol-ipratropium (DUO-NEB) 2.5 MG-0.5 MG/3 ML  3 mL Nebulization Q4H PRN    lactobac ac& pc-s.therm-b.anim (ALEXA Q/RISAQUAD)  1 Cap Oral DAILY    clonazePAM (KlonoPIN) tablet 0.5 mg  0.5 mg Oral DAILY PRN    levothyroxine (SYNTHROID) tablet 100 mcg  100 mcg Oral ACB    [Held by provider] traZODone (DESYREL) tablet 150 mg  150 mg Oral QHS    vilazodone (VIIBRYD) tablet 40 mg  40 mg Oral DAILY    sodium chloride (NS) flush 5-40 mL  5-40 mL IntraVENous Q8H    sodium chloride (NS) flush 5-40 mL  5-40 mL IntraVENous PRN    acetaminophen (TYLENOL) tablet 650 mg  650 mg Oral Q4H PRN    ondansetron (ZOFRAN) injection 4 mg  4 mg IntraVENous Q4H PRN    bisacodyL (DULCOLAX) tablet 5 mg  5 mg Oral DAILY PRN    nicotine (NICODERM CQ) 21 mg/24 hr patch 1 Patch  1 Patch TransDERmal Q24H    guaiFENesin ER (MUCINEX) tablet 600 mg  600 mg Oral Q12H    azithromycin (ZITHROMAX) tablet 500 mg  500 mg Oral DAILY    oseltamivir (TAMIFLU) capsule 30 mg  30 mg Oral BID    sodium chloride (NS) flush 5-10 mL  5-10 mL IntraVENous PRN    sodium chloride (NS) flush 5-40 mL  5-40 mL IntraVENous Q8H    sodium chloride (NS) flush 5-40 mL  5-40 mL IntraVENous PRN     ______________________________________________________________________  EXPECTED LENGTH OF STAY: - - -  ACTUAL LENGTH OF STAY:          2                 Renzo Osborn MD

## 2020-02-26 PROCEDURE — 94664 DEMO&/EVAL PT USE INHALER: CPT

## 2020-02-26 PROCEDURE — 65270000029 HC RM PRIVATE

## 2020-02-26 PROCEDURE — 74011250637 HC RX REV CODE- 250/637: Performed by: HOSPITALIST

## 2020-02-26 PROCEDURE — 74011000250 HC RX REV CODE- 250: Performed by: INTERNAL MEDICINE

## 2020-02-26 PROCEDURE — 94640 AIRWAY INHALATION TREATMENT: CPT

## 2020-02-26 PROCEDURE — 74011000250 HC RX REV CODE- 250: Performed by: HOSPITALIST

## 2020-02-26 PROCEDURE — 74011250636 HC RX REV CODE- 250/636: Performed by: INTERNAL MEDICINE

## 2020-02-26 PROCEDURE — 74011250637 HC RX REV CODE- 250/637: Performed by: INTERNAL MEDICINE

## 2020-02-26 PROCEDURE — 74011250637 HC RX REV CODE- 250/637: Performed by: NURSE PRACTITIONER

## 2020-02-26 PROCEDURE — 77010033678 HC OXYGEN DAILY

## 2020-02-26 PROCEDURE — 94760 N-INVAS EAR/PLS OXIMETRY 1: CPT

## 2020-02-26 RX ORDER — OSELTAMIVIR PHOSPHATE 75 MG/1
75 CAPSULE ORAL 2 TIMES DAILY
Status: COMPLETED | OUTPATIENT
Start: 2020-02-26 | End: 2020-02-27

## 2020-02-26 RX ORDER — ACETYLCYSTEINE 200 MG/ML
400 SOLUTION ORAL; RESPIRATORY (INHALATION) 3 TIMES DAILY
Status: DISCONTINUED | OUTPATIENT
Start: 2020-02-26 | End: 2020-02-27

## 2020-02-26 RX ORDER — LOPERAMIDE HYDROCHLORIDE 2 MG/1
2 CAPSULE ORAL
Status: DISCONTINUED | OUTPATIENT
Start: 2020-02-26 | End: 2020-02-28 | Stop reason: HOSPADM

## 2020-02-26 RX ADMIN — ACETYLCYSTEINE 400 MG: 200 SOLUTION ORAL; RESPIRATORY (INHALATION) at 16:25

## 2020-02-26 RX ADMIN — IPRATROPIUM BROMIDE AND ALBUTEROL SULFATE 3 ML: .5; 3 SOLUTION RESPIRATORY (INHALATION) at 21:23

## 2020-02-26 RX ADMIN — IPRATROPIUM BROMIDE AND ALBUTEROL SULFATE 3 ML: .5; 3 SOLUTION RESPIRATORY (INHALATION) at 16:20

## 2020-02-26 RX ADMIN — GUAIFENESIN 600 MG: 600 TABLET, EXTENDED RELEASE ORAL at 08:30

## 2020-02-26 RX ADMIN — OSELTAMIVIR PHOSPHATE 30 MG: 30 CAPSULE ORAL at 08:30

## 2020-02-26 RX ADMIN — IPRATROPIUM BROMIDE AND ALBUTEROL SULFATE 3 ML: .5; 3 SOLUTION RESPIRATORY (INHALATION) at 07:32

## 2020-02-26 RX ADMIN — Medication 10 ML: at 08:36

## 2020-02-26 RX ADMIN — Medication 10 ML: at 08:35

## 2020-02-26 RX ADMIN — ACETYLCYSTEINE 400 MG: 200 SOLUTION ORAL; RESPIRATORY (INHALATION) at 07:32

## 2020-02-26 RX ADMIN — VILAZODONE HYDROCHLORIDE 40 MG: 40 TABLET ORAL at 08:34

## 2020-02-26 RX ADMIN — Medication 10 ML: at 22:22

## 2020-02-26 RX ADMIN — ACETYLCYSTEINE 400 MG: 200 SOLUTION ORAL; RESPIRATORY (INHALATION) at 21:23

## 2020-02-26 RX ADMIN — HEPARIN SODIUM 5000 UNITS: 5000 INJECTION INTRAVENOUS; SUBCUTANEOUS at 12:37

## 2020-02-26 RX ADMIN — LEVOTHYROXINE SODIUM 100 MCG: 0.1 TABLET ORAL at 07:13

## 2020-02-26 RX ADMIN — Medication 1 CAPSULE: at 08:30

## 2020-02-26 RX ADMIN — GUAIFENESIN 600 MG: 600 TABLET, EXTENDED RELEASE ORAL at 22:21

## 2020-02-26 RX ADMIN — OSELTAMIVIR PHOSPHATE 75 MG: 75 CAPSULE ORAL at 17:02

## 2020-02-26 RX ADMIN — AZITHROMYCIN MONOHYDRATE 500 MG: 250 TABLET ORAL at 08:30

## 2020-02-26 NOTE — PROGRESS NOTES
Problem: Risk for Spread of Infection  Goal: Prevent transmission of infectious organism to others  Description  Prevent the transmission of infectious organisms to other patients, staff members, and visitors. Outcome: Progressing Towards Goal     Problem: Patient Education:  Go to Education Activity  Goal: Patient/Family Education  Outcome: Progressing Towards Goal     Problem: Falls - Risk of  Goal: *Absence of Falls  Description  Document Eugenie Kam Fall Risk and appropriate interventions in the flowsheet.   Outcome: Progressing Towards Goal  Note: Fall Risk Interventions:  Mobility Interventions: Communicate number of staff needed for ambulation/transfer              Elimination Interventions: Call light in reach, Patient to call for help with toileting needs, Toileting schedule/hourly rounds              Problem: Patient Education: Go to Patient Education Activity  Goal: Patient/Family Education  Outcome: Progressing Towards Goal

## 2020-02-26 NOTE — PROGRESS NOTES
Bedside and Verbal shift change report given to DUSTY Collazo (oncoming nurse) by Martin Bryan RN (offgoing nurse). Report included the following information SBAR.

## 2020-02-26 NOTE — PROGRESS NOTES
..Bedside and Verbal shift change report given to Elba (oncoming nurse) by Philipp Almodovar (offgoing nurse). Report included the following information SBAR.

## 2020-02-26 NOTE — PROGRESS NOTES
Day #4 of Tamiflu  Indication:  Flu A  Current regimen:  30 mg BID    Recent Labs     20  0504   WBC 7.2   CREA 0.71   BUN 16   Est CrCl: 61-65 ml/min  Temp (24hrs), Av.2 °F (36.8 °C), Min:98.1 °F (36.7 °C), Max:98.3 °F (36.8 °C)      Plan: Change to 75 mg BID for CrCl >60 ml/min

## 2020-02-27 ENCOUNTER — APPOINTMENT (OUTPATIENT)
Dept: CT IMAGING | Age: 70
DRG: 193 | End: 2020-02-27
Attending: HOSPITALIST
Payer: MEDICARE

## 2020-02-27 LAB
ANION GAP SERPL CALC-SCNC: 5 MMOL/L (ref 5–15)
ARTERIAL PATENCY WRIST A: ABNORMAL
BACTERIA SPEC CULT: ABNORMAL
BACTERIA SPEC CULT: NORMAL
BASE EXCESS BLD CALC-SCNC: 7 MMOL/L
BASOPHILS # BLD: 0 K/UL (ref 0–0.1)
BASOPHILS NFR BLD: 0 % (ref 0–1)
BDY SITE: ABNORMAL
BUN SERPL-MCNC: 20 MG/DL (ref 6–20)
BUN/CREAT SERPL: 26 (ref 12–20)
CA-I BLD-SCNC: 1.26 MMOL/L (ref 1.12–1.32)
CALCIUM SERPL-MCNC: 9 MG/DL (ref 8.5–10.1)
CHLORIDE SERPL-SCNC: 107 MMOL/L (ref 97–108)
CO2 SERPL-SCNC: 31 MMOL/L (ref 21–32)
CREAT SERPL-MCNC: 0.76 MG/DL (ref 0.55–1.02)
DIFFERENTIAL METHOD BLD: ABNORMAL
EOSINOPHIL # BLD: 0.1 K/UL (ref 0–0.4)
EOSINOPHIL NFR BLD: 2 % (ref 0–7)
ERYTHROCYTE [DISTWIDTH] IN BLOOD BY AUTOMATED COUNT: 13.4 % (ref 11.5–14.5)
GAS FLOW.O2 O2 DELIVERY SYS: ABNORMAL L/MIN
GLUCOSE SERPL-MCNC: 84 MG/DL (ref 65–100)
GRAM STN SPEC: ABNORMAL
HCO3 BLD-SCNC: 31.6 MMOL/L (ref 22–26)
HCT VFR BLD AUTO: 28.6 % (ref 35–47)
HGB BLD-MCNC: 8.8 G/DL (ref 11.5–16)
IMM GRANULOCYTES # BLD AUTO: 0 K/UL (ref 0–0.04)
IMM GRANULOCYTES NFR BLD AUTO: 0 % (ref 0–0.5)
LYMPHOCYTES # BLD: 2.3 K/UL (ref 0.8–3.5)
LYMPHOCYTES NFR BLD: 42 % (ref 12–49)
MCH RBC QN AUTO: 33.3 PG (ref 26–34)
MCHC RBC AUTO-ENTMCNC: 30.8 G/DL (ref 30–36.5)
MCV RBC AUTO: 108.3 FL (ref 80–99)
MONOCYTES # BLD: 0.4 K/UL (ref 0–1)
MONOCYTES NFR BLD: 8 % (ref 5–13)
NEUTS SEG # BLD: 2.7 K/UL (ref 1.8–8)
NEUTS SEG NFR BLD: 48 % (ref 32–75)
NRBC # BLD: 0 K/UL (ref 0–0.01)
NRBC BLD-RTO: 0 PER 100 WBC
PCO2 BLD: 46.4 MMHG (ref 35–45)
PH BLD: 7.44 [PH] (ref 7.35–7.45)
PLATELET # BLD AUTO: 246 K/UL (ref 150–400)
PMV BLD AUTO: 10.2 FL (ref 8.9–12.9)
PO2 BLD: 59 MMHG (ref 80–100)
POTASSIUM SERPL-SCNC: 4 MMOL/L (ref 3.5–5.1)
RBC # BLD AUTO: 2.64 M/UL (ref 3.8–5.2)
RBC MORPH BLD: ABNORMAL
SAO2 % BLD: 91 % (ref 92–97)
SERVICE CMNT-IMP: ABNORMAL
SERVICE CMNT-IMP: NORMAL
SODIUM SERPL-SCNC: 143 MMOL/L (ref 136–145)
SPECIMEN TYPE: ABNORMAL
TOTAL RESP. RATE, ITRR: 18
WBC # BLD AUTO: 5.5 K/UL (ref 3.6–11)

## 2020-02-27 PROCEDURE — 74011250637 HC RX REV CODE- 250/637: Performed by: HOSPITALIST

## 2020-02-27 PROCEDURE — 71275 CT ANGIOGRAPHY CHEST: CPT

## 2020-02-27 PROCEDURE — 94664 DEMO&/EVAL PT USE INHALER: CPT

## 2020-02-27 PROCEDURE — 74011250636 HC RX REV CODE- 250/636: Performed by: INTERNAL MEDICINE

## 2020-02-27 PROCEDURE — 74011250637 HC RX REV CODE- 250/637: Performed by: NURSE PRACTITIONER

## 2020-02-27 PROCEDURE — 82803 BLOOD GASES ANY COMBINATION: CPT

## 2020-02-27 PROCEDURE — 74011250637 HC RX REV CODE- 250/637: Performed by: INTERNAL MEDICINE

## 2020-02-27 PROCEDURE — 80048 BASIC METABOLIC PNL TOTAL CA: CPT

## 2020-02-27 PROCEDURE — 65270000029 HC RM PRIVATE

## 2020-02-27 PROCEDURE — 74011000250 HC RX REV CODE- 250: Performed by: HOSPITALIST

## 2020-02-27 PROCEDURE — 74011000258 HC RX REV CODE- 258: Performed by: RADIOLOGY

## 2020-02-27 PROCEDURE — 74011000250 HC RX REV CODE- 250: Performed by: INTERNAL MEDICINE

## 2020-02-27 PROCEDURE — 36600 WITHDRAWAL OF ARTERIAL BLOOD: CPT

## 2020-02-27 PROCEDURE — 85025 COMPLETE CBC W/AUTO DIFF WBC: CPT

## 2020-02-27 PROCEDURE — 74011636320 HC RX REV CODE- 636/320: Performed by: RADIOLOGY

## 2020-02-27 PROCEDURE — 36415 COLL VENOUS BLD VENIPUNCTURE: CPT

## 2020-02-27 PROCEDURE — 94640 AIRWAY INHALATION TREATMENT: CPT

## 2020-02-27 RX ORDER — SODIUM CHLORIDE 0.9 % (FLUSH) 0.9 %
10 SYRINGE (ML) INJECTION
Status: DISPENSED | OUTPATIENT
Start: 2020-02-27 | End: 2020-02-28

## 2020-02-27 RX ORDER — ACETYLCYSTEINE 200 MG/ML
400 SOLUTION ORAL; RESPIRATORY (INHALATION)
Status: DISCONTINUED | OUTPATIENT
Start: 2020-02-27 | End: 2020-02-28 | Stop reason: HOSPADM

## 2020-02-27 RX ORDER — IPRATROPIUM BROMIDE AND ALBUTEROL SULFATE 2.5; .5 MG/3ML; MG/3ML
3 SOLUTION RESPIRATORY (INHALATION)
Status: DISCONTINUED | OUTPATIENT
Start: 2020-02-27 | End: 2020-02-28 | Stop reason: HOSPADM

## 2020-02-27 RX ADMIN — IOPAMIDOL 70 ML: 755 INJECTION, SOLUTION INTRAVENOUS at 17:45

## 2020-02-27 RX ADMIN — SODIUM CHLORIDE 100 ML: 900 INJECTION, SOLUTION INTRAVENOUS at 17:46

## 2020-02-27 RX ADMIN — IPRATROPIUM BROMIDE AND ALBUTEROL SULFATE 3 ML: .5; 3 SOLUTION RESPIRATORY (INHALATION) at 12:40

## 2020-02-27 RX ADMIN — Medication 10 ML: at 16:10

## 2020-02-27 RX ADMIN — OSELTAMIVIR PHOSPHATE 75 MG: 75 CAPSULE ORAL at 18:47

## 2020-02-27 RX ADMIN — AZITHROMYCIN MONOHYDRATE 500 MG: 250 TABLET ORAL at 08:53

## 2020-02-27 RX ADMIN — HEPARIN SODIUM 5000 UNITS: 5000 INJECTION INTRAVENOUS; SUBCUTANEOUS at 23:23

## 2020-02-27 RX ADMIN — HEPARIN SODIUM 5000 UNITS: 5000 INJECTION INTRAVENOUS; SUBCUTANEOUS at 12:25

## 2020-02-27 RX ADMIN — IPRATROPIUM BROMIDE AND ALBUTEROL SULFATE 3 ML: .5; 3 SOLUTION RESPIRATORY (INHALATION) at 07:49

## 2020-02-27 RX ADMIN — HEPARIN SODIUM 5000 UNITS: 5000 INJECTION INTRAVENOUS; SUBCUTANEOUS at 00:55

## 2020-02-27 RX ADMIN — OSELTAMIVIR PHOSPHATE 75 MG: 75 CAPSULE ORAL at 08:53

## 2020-02-27 RX ADMIN — Medication 10 ML: at 07:17

## 2020-02-27 RX ADMIN — GUAIFENESIN 600 MG: 600 TABLET, EXTENDED RELEASE ORAL at 08:53

## 2020-02-27 RX ADMIN — LEVOTHYROXINE SODIUM 100 MCG: 0.1 TABLET ORAL at 07:16

## 2020-02-27 RX ADMIN — Medication 1 CAPSULE: at 08:53

## 2020-02-27 RX ADMIN — IPRATROPIUM BROMIDE AND ALBUTEROL SULFATE 3 ML: .5; 3 SOLUTION RESPIRATORY (INHALATION) at 19:24

## 2020-02-27 RX ADMIN — GUAIFENESIN 600 MG: 600 TABLET, EXTENDED RELEASE ORAL at 20:37

## 2020-02-27 NOTE — PROGRESS NOTES
[facility] Adult  Hospitalist Group                                                                                          Hospitalist Progress Note  Daniel Jenkins MD  Answering service: [phone] from in house phone        Date of Service:  2020  NAME:  Nel Alcantar  :  1950  MRN:  [id number]      Admission Summary: This is a 51-year-old woman with past medical history significant for hypothyroidism, dyslipidemia, anxiety/depression, and right breast cancer, who was in her usual state of health until about a week ago when the patient developed nasal congestion. But in the last  24 to 48 hours, the nasal congestion became associated with shortness of breath. The shortness of breath is progressive and getting worse associated with cough which is productive of yellowish sputum. The patient also complained of headache. The headache is diffuse in location, not throbbing. The patient described the headache as mild and 4/10 in severity. The patient went to [facility] Room at [facility] for further evaluation. When the patient arrived at the emergency room, the patient was found to be influenza A antigen positive. The patient also has elevated BNP level. The patient has no history of asthma, no history of congestive heart failure. The patient was referred to the hospitalist service for evaluation for admission. The patient also stated that she was recently at [place] [address] and she is taking part in experimental treatment for depression. The patient has no fever, no rigors, no chills. Interval history / Subjective: Follow up Influenza A + infection.  -She is trying to wean off oxygen, SPO2 is improved today and she will probably need 1 more day to completely wean off oxygen.   -I received a call from nursing saying patient is requesting Imodium for diarrhea which says is chronic and she follows with her primary doctor.        Assessment & Plan: # Influenza A bronchitis  -Tamiflu and antibiotics for secondary infection  -Supportive care    # Acute hypoxic respiratory failure  -On oxygen via nasal cannula, wean as able to room air    # Elevated BNP  -No evidence of CHF. Echocardiogram with normal EF. Cardiology signed off. # Hyperglycemia, not a DM pt, likely due to stress of acute illness. -A1c 5.6    Hypothyroidism  -Cont symthroid    # HDL  -Home medication    # Smoking:  -Cessation counseling    #Mild macrocytic anemia. Vitamin B12 and folate are normal.  Recommend outpatient follow-up    Code status: Full code  DVT prophylaxis: Heparin SQ    Care Plan discussed with: Patient/Family and Nurse  Anticipated Disposition: Home w/Family  Anticipated Discharge: 24 hours to 48 hours     Hospital Problems  Date Reviewed: 2/23/2020          Codes Class Noted POA    Wheezing ICD-10-CM: R06.2  ICD-9-CM: 786.07  2/22/2020 Unknown        * (Principal) Influenza A ICD-10-CM: J10.1  ICD-9-CM: 487.1  2/22/2020 Yes        Acute respiratory failure with hypoxia [facility]) ICD-10-CM: J96.01  ICD-9-CM: 518.81  2/22/2020 Unknown                Review of Systems:   A comprehensive review of systems was negative except for that written in the HPI. Vital Signs:    Last 24hrs VS reviewed since prior progress note. Most recent are:  Visit Vitals  /60 (BP 1 Location: Right arm, BP Patient Position: At rest)   Pulse 80   Temp 98.7 °F (37.1 °C)   Resp 15   Ht 5' 2\" (1.575 m)   Wt 52.2 kg (115 lb 1.3 oz)   SpO2 97%   BMI 21.05 kg/m²       No intake or output data in the 24 hours ending 02/26/20 1917     Physical Examination:             Constitutional:  No acute distress, cooperative, pleasant    ENT:  Oral mucosa moist, oropharynx benign. Resp:  On oxygen via nasal cannula. CTA bilaterally. No wheezing/rhonchi/rales. No accessory muscle use   CV:  Regular rhythm, normal rate, no murmurs, gallops, rubs    GI:  Soft, non distended, non tender.  normoactive bowel sounds, no hepatosplenomegaly     Musculoskeletal:  No peripheral edema. Neurologic:  Moves all extremities. AAOx3, CN II-XII reviewed     Psych:  Good insight, Not anxious nor agitated. Skin:  Good turgor, no rashes or ulcers  Hematologic/Lymphatic/Immunlogic:  No jaundice nor lymph node swelling  Eyes:  EOMI. Anicteric sclerae, PERRL. Data Review:    Review and/or order of clinical lab test  Review and/or order of tests in the radiology section of CPT  Review and/or order of tests in the medicine section of CPT    Xr Chest Pa Lat    Result Date: 2/22/2020  IMPRESSION: No acute cardiopulmonary process      Labs:     Recent Labs     02/24/20  0504   WBC 7.2   HGB 9.5*   HCT 30.8*        Recent Labs     02/24/20  0504      K 4.0      CO2 32   BUN 16   CREA 0.71   GLU 95   CA 8.6     No results for input(s): SGOT, GPT, ALT, AP, TBIL, TBILI, TP, ALB, GLOB, GGT, AML, LPSE in the last 72 hours. No lab exists for component: AMYP, HLPSE  No results for input(s): INR, PTP, APTT, INREXT, INREXT in the last 72 hours. No results for input(s): FE, TIBC, PSAT, FERR in the last 72 hours. Lab Results   Component Value Date/Time    Folate 23.1 (H) 02/23/2020 05:34 AM      No results for input(s): PH, PCO2, PO2 in the last 72 hours. No results for input(s): CPK, CKNDX, TROIQ in the last 72 hours.     No lab exists for component: CPKMB  Lab Results   Component Value Date/Time    Cholesterol, total 132 02/23/2020 05:29 AM    HDL Cholesterol 61 02/23/2020 05:29 AM    LDL, calculated 61.8 02/23/2020 05:29 AM    Triglyceride 46 02/23/2020 05:29 AM    CHOL/HDL Ratio 2.2 02/23/2020 05:29 AM     No results found for: [facility]  Lab Results   Component Value Date/Time    Color YELLOW/STRAW 02/23/2020 10:58 AM    Appearance CLEAR 02/23/2020 10:58 AM    Specific gravity <1.005 02/23/2020 10:58 AM    pH (UA) 6.5 02/23/2020 10:58 AM    Protein NEGATIVE  02/23/2020 10:58 AM    Glucose NEGATIVE  02/23/2020 10:58 AM    Ketone NEGATIVE  02/23/2020 10:58 AM    Bilirubin NEGATIVE  02/23/2020 10:58 AM    Urobilinogen 0.2 02/23/2020 10:58 AM    Nitrites NEGATIVE  02/23/2020 10:58 AM    Leukocyte Esterase NEGATIVE  02/23/2020 10:58 AM    Epithelial cells FEW 02/23/2020 10:58 AM    Bacteria NEGATIVE  02/23/2020 10:58 AM    WBC 0-4 02/23/2020 10:58 AM    RBC 0-5 02/23/2020 10:58 AM         Medications Reviewed:     Current Facility-Administered Medications   Medication Dose Route Frequency    acetylcysteine (MUCOMYST) 200 mg/mL (20 %) solution 400 mg  400 mg Nebulization TID    oseltamivir (TAMIFLU) capsule 75 mg  75 mg Oral BID    diphenhydrAMINE (BENADRYL) capsule 25 mg  25 mg Oral TID PRN    heparin (porcine) injection 5,000 Units  5,000 Units SubCUTAneous Q12H    albuterol-ipratropium (DUO-NEB) 2.5 MG-0.5 MG/3 ML  3 mL Nebulization QID RT    albuterol-ipratropium (DUO-NEB) 2.5 MG-0.5 MG/3 ML  3 mL Nebulization Q4H PRN    lactobac ac& pc-s.therm-b.anim (ALEXA Q/RISAQUAD)  1 Cap Oral DAILY    clonazePAM (KlonoPIN) tablet 0.5 mg  0.5 mg Oral DAILY PRN    levothyroxine (SYNTHROID) tablet 100 mcg  100 mcg Oral ACB    [Held by provider] traZODone (DESYREL) tablet 150 mg  150 mg Oral QHS    vilazodone (VIIBRYD) tablet 40 mg  40 mg Oral DAILY    sodium chloride (NS) flush 5-40 mL  5-40 mL IntraVENous Q8H    sodium chloride (NS) flush 5-40 mL  5-40 mL IntraVENous PRN    acetaminophen (TYLENOL) tablet 650 mg  650 mg Oral Q4H PRN    ondansetron (ZOFRAN) injection 4 mg  4 mg IntraVENous Q4H PRN    bisacodyL (DULCOLAX) tablet 5 mg  5 mg Oral DAILY PRN    nicotine (NICODERM CQ) 21 mg/24 hr patch 1 Patch  1 Patch TransDERmal Q24H    guaiFENesin ER (MUCINEX) tablet 600 mg  600 mg Oral Q12H    azithromycin (ZITHROMAX) tablet 500 mg  500 mg Oral DAILY    sodium chloride (NS) flush 5-10 mL  5-10 mL IntraVENous PRN    sodium chloride (NS) flush 5-40 mL  5-40 mL IntraVENous Q8H    sodium chloride (NS) flush 5-40 mL  5-40 mL IntraVENous PRN     ______________________________________________________________________  EXPECTED LENGTH OF STAY: 4d 4h  ACTUAL LENGTH OF STAY:          4                 Jessa Covarrubias MD

## 2020-02-27 NOTE — PROGRESS NOTES
Bedside and Verbal shift change report given to Robbie Montgomery (oncoming nurse) by Shannan Schumacher (offgoing nurse). Report included the following information SBAR, Kardex, MAR and Recent Results.

## 2020-02-27 NOTE — PROGRESS NOTES
Bedside and Verbal shift change report given to [address] [place] [address] Tony, RN (oncoming nurse) by DUSTY Villa (offgoing nurse). Report included the following information SBAR, Kardex and MAR.

## 2020-02-27 NOTE — PROGRESS NOTES
Sent message to Dr. Pierre Grullon via [employer] regarding patient's chronic diarrhea. MD will put in order for Imodium.

## 2020-02-27 NOTE — PROGRESS NOTES
Problem: Risk for Spread of Infection  Goal: Prevent transmission of infectious organism to others  Description  Prevent the transmission of infectious organisms to other patients, staff members, and visitors. Outcome: Progressing Towards Goal  Note:   Comply with all droplet precautions  Inform visitors of droplet precautions and advise them of correct protective gear     Problem: Falls - Risk of  Goal: *Absence of Falls  Description  Document Clide Failing Fall Risk and appropriate interventions in the flowsheet.   Outcome: Progressing Towards Goal  Note: Fall Risk Interventions:  Mobility Interventions: Communicate number of staff needed for ambulation/transfer, Patient to call before getting OOB, PT Consult for mobility concerns         Medication Interventions: Evaluate medications/consider consulting pharmacy, Patient to call before getting OOB, Teach patient to arise slowly    Elimination Interventions: Call light in reach, Patient to call for help with toileting needs, Elevated toilet seat              Problem: Breathing Pattern - Ineffective  Goal: *Absence of hypoxia  Outcome: Progressing Towards Goal  Note:   Assess breath sounds, breathing pattern, oxygen saturation and respiratory rate  Monitor for changes in respiratory rate

## 2020-02-28 VITALS
TEMPERATURE: 98.1 F | SYSTOLIC BLOOD PRESSURE: 104 MMHG | OXYGEN SATURATION: 95 % | HEART RATE: 90 BPM | BODY MASS INDEX: 21.95 KG/M2 | DIASTOLIC BLOOD PRESSURE: 57 MMHG | RESPIRATION RATE: 16 BRPM | WEIGHT: 119.27 LBS | HEIGHT: 62 IN

## 2020-02-28 PROCEDURE — 74011250637 HC RX REV CODE- 250/637: Performed by: INTERNAL MEDICINE

## 2020-02-28 PROCEDURE — 74011000250 HC RX REV CODE- 250: Performed by: HOSPITALIST

## 2020-02-28 PROCEDURE — 94640 AIRWAY INHALATION TREATMENT: CPT

## 2020-02-28 PROCEDURE — 94760 N-INVAS EAR/PLS OXIMETRY 1: CPT

## 2020-02-28 PROCEDURE — 74011250637 HC RX REV CODE- 250/637: Performed by: NURSE PRACTITIONER

## 2020-02-28 RX ADMIN — IPRATROPIUM BROMIDE AND ALBUTEROL SULFATE 3 ML: .5; 3 SOLUTION RESPIRATORY (INHALATION) at 07:33

## 2020-02-28 RX ADMIN — GUAIFENESIN 600 MG: 600 TABLET, EXTENDED RELEASE ORAL at 08:49

## 2020-02-28 RX ADMIN — Medication 1 CAPSULE: at 08:49

## 2020-02-28 RX ADMIN — LEVOTHYROXINE SODIUM 100 MCG: 0.1 TABLET ORAL at 06:14

## 2020-02-28 RX ADMIN — LEVOTHYROXINE SODIUM 100 MCG: 0.1 TABLET ORAL at 07:30

## 2020-02-28 NOTE — PROGRESS NOTES
Problem: Falls - Risk of  Goal: *Absence of Falls  Description  Document Laurie Burgess Fall Risk and appropriate interventions in the flowsheet.   Outcome: Progressing Towards Goal  Note: Fall Risk Interventions:  Mobility Interventions: Communicate number of staff needed for ambulation/transfer, Patient to call before getting OOB         Medication Interventions: Evaluate medications/consider consulting pharmacy, Patient to call before getting OOB, Teach patient to arise slowly    Elimination Interventions: Call light in reach, Elevated toilet seat, Patient to call for help with toileting needs, Stay With Me (per policy), Toilet paper/wipes in reach, Toileting schedule/hourly rounds              Problem: Breathing Pattern - Ineffective  Goal: *Absence of hypoxia  Outcome: Progressing Towards Goal  Note:   Assess breathing pattern, respiratory rate, breath sounds and oxygen saturation  Patient will remain on room air  Monitor for changes in patients respiratory status

## 2020-02-28 NOTE — PROGRESS NOTES
I have reviewed discharge instructions with the patient. The patient verbalized understanding. Signs, symptoms and side effects discussed. Opportunity for questions and clarification provided. Patient leaving via private vehicle with .   Patient discharging to home

## 2020-02-28 NOTE — PROGRESS NOTES
Bedside and Verbal shift change report given to Lizzie Noyola RN (oncoming nurse) by Annette Dixon RN (offgoing nurse). Report included the following information SBAR, Kardex, Intake/Output, MAR and Recent Results.

## 2020-02-28 NOTE — DISCHARGE INSTRUCTIONS
Discharge Instructions       PATIENT ID: Layo Lee  MRN: [id number]   YOB: 1950    DATE OF ADMISSION: 2/22/2020  6:26 PM    DATE OF DISCHARGE: 2/28/2020    PRIMARY CARE PROVIDER: Anila Arreola MD     ATTENDING PHYSICIAN: Tomasa Cheadle, MD  DISCHARGING PROVIDER: Siva Garnett MD    To contact this individual call [phone] and ask the  to page. If unavailable ask to be transferred the Adult Hospitalist Department. DISCHARGE DIAGNOSES and CARE RECOMMENDATIONS:   · Influenza A infection: you have completed Tamiflu in the hospital  · Acute hypoxic respiratory failure: resolved  · You may need to have pulmonary function test for COPD ,ask your primary doctor for referral to a pulmonary doctor. Strongly recommend you quit smoking  · When you quit smoking, you lower your risks for cancer, lung disease, heart attack, stroke, blood vessel disease, and blindness from macular degeneration. · When you're smoke-free, you get sick less often, and you heal faster. You are less likely to get colds, flu, bronchitis, and pneumonia. · As a nonsmoker, you may find that your mood is better and you are less stressed. When and how will you feel healthier? Quitting has real health benefits that start from day 1 of being smoke-free. And the longer you stay smoke-free, the healthier you get and the better you feel. The first hours  · After just 20 minutes, your blood pressure and heart rate go down. That means there's less stress on your heart and blood vessels. · Within 12 hours, the level of carbon monoxide in your blood drops back to normal. That makes room for more oxygen. With more oxygen in your body, you may notice that you have more energy than when you smoked. After 2 weeks  · Your lungs start to work better. · Your risk of heart attack starts to drop. After 1 month  · When your lungs are clear, you cough less and breathe deeper, so it's easier to be active.   · Your sense of taste and smell return. That means you can enjoy food more than you have since you started smoking. Over the years  · After 1 year, your risk of heart disease is half what it would be if you kept smoking. · After 5 years, your risk of stroke starts to shrink. Within a few years after that, it's about the same as if you'd never smoked. · After 10 years, your risk of dying from lung cancer is cut by about half. And your risk for many other types of cancer is lower too. How would quitting help others in your life? When you quit smoking, you improve the health of everyone who now breathes in your smoke. · Their heart, lung, and cancer risks drop, much like yours. · They are sick less. For babies and small children, living smoke-free means they're less likely to have ear infections, pneumonia, and bronchitis. · If you're a woman who is or will be pregnant someday, quitting smoking means a healthier . · Children who are close to you are less likely to become adult smokers. Where can you learn more? Go to http://theresa-cathleen.info/. Enter [id number] [id number] [id number] 11 in the search box to learn more about \"Learning About Benefits From Quitting Smoking. \"  Current as of: 2018  Content Version: 12.2  © 1961-3148 [employer]. Care instructions adapted under license by [employer] (which disclaims liability or warranty for this information). If you have questions about a medical condition or this instruction, always ask your healthcare professional. [place] [address] any warranty or liability for your use of this information. · Anemia: you were noted to have mild anemia. Please discuss with your primary doctor for further work up.     DIET: Regular Diet      ACTIVITY: Activity as tolerated        PENDING TEST RESULTS:   At the time of discharge the following test results are still pending: none    FOLLOW UP APPOINTMENTS:   Follow-up Information     Follow up With Specialties Details Why Contact Info    Carlean Mcburney, MD Internal Medicine   [address] [facility] [address] [facility]  [address] [facility]  [address]      Carlean Mcburney, MD Internal Medicine   [address]  [facility]  [phone]               YOU WERE SEEN BY THE FOLLOWING CONSULTATIONS:   IP CONSULT TO CARDIOLOGY    YOU HAD THE FOLLOWING PROCEDURES/SURGERIES  :   * No surgery found *              DISCHARGE MEDICATIONS:   See Medication Reconciliation Form    · It is important that you take the medication exactly as they are prescribed. · Keep your medication in the bottles provided by the pharmacist and keep a list of the medication names, dosages, and times to be taken in your wallet. · Do not take other medications without consulting your doctor. NOTIFY YOUR PHYSICIAN FOR ANY OF THE FOLLOWING:   Fever over 101 degrees for 24 hours. Chest pain, shortness of breath, fever, chills, nausea, vomiting, diarrhea, change in mentation, falling, weakness, bleeding. Severe pain or pain not relieved by medications. Or, any other signs or symptoms that you may have questions about. Signed:    Danay Hdez MD  2/28/2020  8:43 AM

## 2020-02-28 NOTE — DISCHARGE SUMMARY
Discharge Summary       PATIENT ID: Ludmila Atwood  MRN: [id number]   YOB: 1950    DATE OF ADMISSION: 2/22/2020  6:26 PM    DATE OF DISCHARGE: 02/28/20     PRIMARY CARE PROVIDER: Elan Dumont MD     ATTENDING PHYSICIAN: Alyce More MD    DISCHARGING PROVIDER: Alyce More MD    To contact this individual call [phone] and ask the  to page. If unavailable ask to be transferred the Adult Hospitalist Department. CONSULTATIONS: IP CONSULT TO CARDIOLOGY    PROCEDURES/SURGERIES: * No surgery found *    ADMITTING [address] [place] [address] COURSE:   Admission Summary: This is a 57-year-old woman with past medical history significant for hypothyroidism, dyslipidemia, anxiety/depression, and right breast cancer was admitted for acute hypoxic respiratory-year-old due to influenza A bronchitis. She was treated with antibiotics, bronchodilators and Tamiflu. She improved slowly, her oxygenation took time to improve. Due to the slow response in the hypoxia, we performed a CT angiogram of the chest PE was ruled out. Patient now on room air for more than 24 hours oxygen saturation is above 92%. ABG without significant CO2 retention. On imaging of the lungs are hyperinflated, I suspect she may have underlying emphysema/COPD. I have recommended for her to have outpatient pulmonary function test.  She is strongly urged to quit smoking. DISCHARGE DIAGNOSES / PLAN:    # Influenza A bronchitis: Completed Tamiflu in the past.     # Acute hypoxic respiratory failure: Suspect undiagnosed COPD. She has been around on room air for more than 24 hours. Advised to follow-up for outpatient PFTs  # Elevated BNP  -No evidence of CHF. Echocardiogram with normal EF. Cardiology signed off. # Hyperglycemia, not a DM pt, likely due to stress of acute illness.   -A1c 5.6     Hypothyroidism  -Cont symthroid     # HDL  -Home medication     # Smoking:  -Cessation counseling     #Mild macrocytic anemia. Vitamin B12 and folate are normal.  Recommend outpatient follow-up     #Anxiety, patient has used benzodiazepines as needed previously, she declined this time. PENDING TEST RESULTS:   At the time of discharge the following test results are still pending: None    FOLLOW UP APPOINTMENTS:    Follow-up Information     Follow up With Specialties Details Why Contact Info    Kelly Goyal MD Internal Medicine   [address] [facility] [address] [place] [address] [address]      Kelly Goyal MD Internal Medicine   . [address]  [facility] [address]  [facility]  [phone]               DIET: Regular Diet and Cardiac Diet    ACTIVITY: Activity as tolerated          DISCHARGE MEDICATIONS:  Current Discharge Medication List      START taking these medications    Details   albuterol sulfate 90 mcg/actuation aepb Take 1-2 Puffs by inhalation every six (6) hours as needed for Wheezing. Qty: 1 Inhaler, Refills: 0         CONTINUE these medications which have NOT CHANGED    Details   !! levothyroxine (SYNTHROID) 100 mcg tablet TAKE 1 TABLET EVERY DAY BEFORE BREAKFAST  Qty: 90 Tab, Refills: 3    Associated Diagnoses: Acquired hypothyroidism      dextroamphetamine (DEXTROSTAT) 10 mg tab TAKE 1 TABLET BY MOUTH TWICE A DAY  Refills: 0      traZODone (DESYREL) 150 mg tablet TAKE 1 TABLET EVERY NIGHT  Qty: 90 Tab, Refills: 3      !! levothyroxine (SYNTHROID) 100 mcg tablet Take 1 Tab by mouth Daily (before breakfast).   Qty: 90 Tab, Refills: 3    Comments: Dose change  Associated Diagnoses: Acquired hypothyroidism      permethrin (ACTICIN) 5 % topical cream permethrin 5 % topical cream   APPLY (THOROUGHLY MASSAGE INTO SKIN FROM HEAD TO SOLES OF FEET) BY TOPICAL ROUTE ONCE LEAVE ON FOR 8-14 HR, THEN REMOVE BY THOROUGH WASHING      clonazePAM (KLONOPIN) 0.5 mg tablet TAKE 1-2 TABLETS EACH DAY AS NEEDED FOR ANXIETY  Refills: 0      VIIBRYD 40 mg tab tablet TAKE 1 TABLET BY MOUTH EVERY DAY  Refills: 1      LORazepam (ATIVAN) 1 mg tablet TAKE 1 TABLET BY MOUTH TWICE A DAY AS NEEDED  Qty: 60 Tab, Refills: 1    Comments: Not to exceed 4 additional fills before 05/19/2019  Associated Diagnoses: Anxiety and depression      ARIPiprazole (ABILIFY) 5 mg tablet       sertraline (ZOLOFT) 100 mg tablet TAKE  1 1/2 TABLET BY MOUTH EVERY DAY  Refills: 1      promethazine (PHENERGAN) 25 mg tablet Take 1/2-1 tablet every 6 hours as needed for nausea. Qty: 30 Tab, Refills: 2    Associated Diagnoses: Nausea      ondansetron (ZOFRAN ODT) 4 mg disintegrating tablet Take 1 Tab by mouth every eight (8) hours as needed for Nausea. Qty: 30 Tab, Refills: 2    Associated Diagnoses: Nausea      colestipol (COLESTID) 1 gram tablet Take 1 Tab by mouth two (2) times a day. Qty: 60 Tab, Refills: 3    Associated Diagnoses: Chronic diarrhea      Bacillus coagulans (PROBIOTIC, B. COAGULANS, PO) Take  by mouth. naproxen sodium (ALEVE) 220 mg cap Take  by mouth. !! - Potential duplicate medications found. Please discuss with provider. NOTIFY YOUR PHYSICIAN FOR ANY OF THE FOLLOWING:   Fever over 101 degrees for 24 hours. Chest pain, shortness of breath, fever, chills, nausea, vomiting, diarrhea, change in mentation, falling, weakness, bleeding. Severe pain or pain not relieved by medications. Or, any other signs or symptoms that you may have questions about. DISPOSITION:  x  Home With:   OT  PT  HH  RN       Long term SNF/Inpatient Rehab    Independent/assisted living    Hospice    Other:       PATIENT CONDITION AT DISCHARGE:     Functional status    Poor     Deconditioned    x Independent      Cognition   x  Lucid     Forgetful     Dementia      Catheters/lines (plus indication)    Sultana     PICC     PEG    x None      Code status    x Full code     DNR      PHYSICAL EXAMINATION AT DISCHARGE:  General:          Alert, cooperative, no distress, appears stated age. HEENT:           Atraumatic, anicteric sclerae, pink conjunctivae                          No oral ulcers, mucosa moist, throat clear, dentition fair  Neck:               Supple, symmetrical  Lungs: On room air. Not in distress. No wheezing or other added sounds. Chest wall:      No tenderness  No Accessory muscle use. Heart:              Regular  rhythm,  No  murmur   No edema  Abdomen:        Soft, non-tender. Not distended. Bowel sounds normal  Extremities:      No peripheral edema. Skin:                Not pale. Not Jaundiced  No rashes   Psych:             Not anxious or agitated. Neurologic:      Alert, moves all extremities, answers questions appropriately and responds to commands       CHRONIC MEDICAL DIAGNOSES:  Problem List as of 2/28/2020 Date Reviewed: 2/23/2020          Codes Class Noted - Resolved    Wheezing ICD-10-CM: R06.2  ICD-9-CM: 786.07  2/22/2020 - Present        * (Principal) Influenza A ICD-10-CM: J10.1  ICD-9-CM: 487.1  2/22/2020 - Present        Ac[facility] ([facility] ICD-10-CM: J96.01  ICD-9-CM: 518.81  2/22/2020 - Present        Cervical disc disease ICD-10-CM: M50.90  ICD-9-CM: 722.91  1/13/2016 - Present        Family history of melanoma ICD-10-CM: Z80.8  ICD-9-CM: V16.8  1/13/2016 - Present        Thyroid activity decreased ICD-10-CM: E03.9  ICD-9-CM: 244.9  10/14/2015 - Present        Insomnia ICD-10-CM: G47.00  ICD-9-CM: 780.52  9/29/2015 - Present        Hypothyroidism ICD-10-CM: E03.9  ICD-9-CM: 244.9  9/29/2015 - Present        Anxiety and depression ICD-10-CM: F41.9, F32.9  ICD-9-CM: 300.00, 311  9/29/2015 - Present        RESOLVED: Anxiety ICD-10-CM: F41.9  ICD-9-CM: 300.00  9/29/2015 - 9/29/2015        RESOLVED: Bronchitis ICD-10-CM: J40  ICD-9-CM: 778  9/29/2015 - 3/13/2019              Greater than 30 minutes were spent with the patient on counseling and coordination of care    Signed:    Luanne Peck MD  2/28/2020  8:54 AM

## 2020-02-28 NOTE — PROGRESS NOTES
[address] [facility] Adult  Hospitalist Group                                                                                          Hospitalist Progress Note  Daniel Jenkins MD  Answering service: [phone] from in house phone        Date of Service:  2020  NAME:  Nel Alcantar  :  1950  MRN:  [id number]      Admission Summary: This is a 51-year-old woman with past medical history significant for hypothyroidism, dyslipidemia, anxiety/depression, and right breast cancer, who was in her usual state of health until about a week ago when the patient developed nasal congestion. But in the last  24 to 48 hours, the nasal congestion became associated with shortness of breath. The shortness of breath is progressive and getting worse associated with cough which is productive of yellowish sputum. The patient also complained of headache. The headache is diffuse in location, not throbbing. The patient described the headache as mild and 4/10 in severity. The patient went to [facility] at [facility] for further evaluation. When the patient arrived at the emergency room, the patient was found to be influenza A antigen positive. The patient also has elevated BNP level. The patient has no history of asthma, no history of congestive heart failure. The patient was referred to the hospitalist service for evaluation for admission. The patient also stated that she was recently at [place] [address] and she is taking part in experimental treatment for depression. The patient has no fever, no rigors, no chills. Interval history / Subjective: Follow up Influenza A + infection.  -Oxygenation has improved and she has mostly remained on room air however her SPO2 occasionally drop as low as 85% on room air. ABG showed metabolic alkalosis, SPO2 under 60.          Assessment & Plan:     # Influenza A bronchitis  -Tamiflu and antibiotics for secondary infection  -Supportive care # Acute hypoxic respiratory failure  -Hypoxia improved but with occasional drop in SPO2 as low as low 80s. -CT of the chest negative for PE but showed bilateral lower lobe airspace disease. Patient has completed Zithromax. She is afebrile. WBC is normal    # Elevated BNP  -No evidence of CHF. Echocardiogram with normal EF. Cardiology signed off. # Hyperglycemia, not a DM pt, likely due to stress of acute illness. -A1c 5.6    Hypothyroidism  -Cont symthroid    # HDL  -Home medication    # Smoking:  -Cessation counseling    #Mild macrocytic anemia. Vitamin B12 and folate are normal.  Recommend outpatient follow-up    #Anxiety, patient has used benzodiazepines as needed previously, she declined this time. Code status: Full code  DVT prophylaxis: Heparin SQ    Care Plan discussed with: Patient/Family and Nurse  Anticipated Disposition: Home w/Family  Anticipated Discharge: 24 hours to 48 hours     Hospital Problems  Date Reviewed: 2/23/2020          Codes Class Noted POA    Wheezing ICD-10-CM: R06.2  ICD-9-CM: 786.07  2/22/2020 Unknown        * (Principal) Influenza A ICD-10-CM: J10.1  ICD-9-CM: 487.1  2/22/2020 Yes        Acute respiratory failure with hypoxia [facility]) ICD-10-CM: J96.01  ICD-9-CM: 518.81  2/22/2020 Unknown                Review of Systems:   A comprehensive review of systems was negative except for that written in the HPI. Vital Signs:    Last 24hrs VS reviewed since prior progress note. Most recent are:  Visit Vitals  /72   Pulse 93   Temp 98 °F (36.7 °C)   Resp 16   Ht 5' 2\" (1.575 m)   Wt 54.1 kg (119 lb 4.3 oz)   SpO2 93%   BMI 21.81 kg/m²       No intake or output data in the 24 hours ending 02/27/20 2108     Physical Examination:             Constitutional:  No acute distress, cooperative, pleasant    ENT:  Oral mucosa moist, oropharynx benign. Resp:  CTA bilaterally. No wheezing/rhonchi/rales.  No accessory muscle use   CV:  Regular rhythm, normal rate, no murmurs, gallops, rubs    GI:  Soft, non distended, non tender. normoactive bowel sounds, no hepatosplenomegaly     Musculoskeletal:  No peripheral edema. Neurologic:  Moves all extremities. AAOx3, CN II-XII reviewed     Psych:  Good insight, Not anxious nor agitated. Skin:  Good turgor, no rashes or ulcers  Hematologic/Lymphatic/Immunlogic:  No jaundice nor lymph node swelling  Eyes:  EOMI. Anicteric sclerae, PERRL. Data Review:    Review and/or order of clinical lab test  Review and/or order of tests in the radiology section of CPT  Review and/or order of tests in the medicine section of CPT    Xr Chest Pa Lat    Result Date: 2/22/2020  IMPRESSION: No acute cardiopulmonary process    Cta Chest W Or W Wo Cont    Result Date: 2/27/2020  IMPRESSION: 1. No evidence for pulmonary embolism. 2. Innumerable small nodular opacities are seen in the lower lobes bilaterally associated with bronchial wall thickening and mucus plugging most compatible with infection. Labs:     Recent Labs     02/27/20  0405   WBC 5.5   HGB 8.8*   HCT 28.6*        Recent Labs     02/27/20  0405      K 4.0      CO2 31   BUN 20   CREA 0.76   GLU 84   CA 9.0     No results for input(s): SGOT, GPT, ALT, AP, TBIL, TBILI, TP, ALB, GLOB, GGT, AML, LPSE in the last 72 hours. No lab exists for component: AMYP, HLPSE  No results for input(s): INR, PTP, APTT, INREXT, INREXT in the last 72 hours. No results for input(s): FE, TIBC, PSAT, FERR in the last 72 hours. Lab Results   Component Value Date/Time    Folate 23.1 (H) 02/23/2020 05:34 AM      No results for input(s): PH, PCO2, PO2 in the last 72 hours. No results for input(s): CPK, CKNDX, TROIQ in the last 72 hours.     No lab exists for component: CPKMB  Lab Results   Component Value Date/Time    Cholesterol, total 132 02/23/2020 05:29 AM    HDL Cholesterol 61 02/23/2020 05:29 AM    LDL, calculated 61.8 02/23/2020 05:29 AM    Triglyceride 46 02/23/2020 05:29 AM CHOL/HDL Ratio 2.2 02/23/2020 05:29 AM     No results found for: Blanca Vania  Lab Results   Component Value Date/Time    Color YELLOW/STRAW 02/23/2020 10:58 AM    Appearance CLEAR 02/23/2020 10:58 AM    Specific gravity <1.005 02/23/2020 10:58 AM    pH (UA) 6.5 02/23/2020 10:58 AM    Protein NEGATIVE  02/23/2020 10:58 AM    Glucose NEGATIVE  02/23/2020 10:58 AM    Ketone NEGATIVE  02/23/2020 10:58 AM    Bilirubin NEGATIVE  02/23/2020 10:58 AM    Urobilinogen 0.2 02/23/2020 10:58 AM    Nitrites NEGATIVE  02/23/2020 10:58 AM    Leukocyte Esterase NEGATIVE  02/23/2020 10:58 AM    Epithelial cells FEW 02/23/2020 10:58 AM    Bacteria NEGATIVE  02/23/2020 10:58 AM    WBC 0-4 02/23/2020 10:58 AM    RBC 0-5 02/23/2020 10:58 AM         Medications Reviewed:     Current Facility-Administered Medications   Medication Dose Route Frequency    albuterol-ipratropium (DUO-NEB) 2.5 MG-0.5 MG/3 ML  3 mL Nebulization TID RT    sodium chloride (NS) flush 10 mL  10 mL IntraVENous RAD ONCE    iopamidoL (ISOVUE-370) 76 % injection        acetylcysteine (MUCOMYST) 200 mg/mL (20 %) solution 400 mg  400 mg Nebulization TID RT    loperamide (IMODIUM) capsule 2 mg  2 mg Oral Q4H PRN    diphenhydrAMINE (BENADRYL) capsule 25 mg  25 mg Oral TID PRN    heparin (porcine) injection 5,000 Units  5,000 Units SubCUTAneous Q12H    albuterol-ipratropium (DUO-NEB) 2.5 MG-0.5 MG/3 ML  3 mL Nebulization Q4H PRN    lactobac ac& pc-s.therm-b.anim (ALEXA Q/RISAQUAD)  1 Cap Oral DAILY    clonazePAM (KlonoPIN) tablet 0.5 mg  0.5 mg Oral DAILY PRN    levothyroxine (SYNTHROID) tablet 100 mcg  100 mcg Oral ACB    [Held by provider] traZODone (DESYREL) tablet 150 mg  150 mg Oral QHS    vilazodone (VIIBRYD) tablet 40 mg  40 mg Oral DAILY    sodium chloride (NS) flush 5-40 mL  5-40 mL IntraVENous Q8H    sodium chloride (NS) flush 5-40 mL  5-40 mL IntraVENous PRN    acetaminophen (TYLENOL) tablet 650 mg  650 mg Oral Q4H PRN    ondansetron (ZOFRAN) injection 4 mg  4 mg IntraVENous Q4H PRN    bisacodyL (DULCOLAX) tablet 5 mg  5 mg Oral DAILY PRN    nicotine (NICODERM CQ) 21 mg/24 hr patch 1 Patch  1 Patch TransDERmal Q24H    guaiFENesin ER (MUCINEX) tablet 600 mg  600 mg Oral Q12H    sodium chloride (NS) flush 5-10 mL  5-10 mL IntraVENous PRN    sodium chloride (NS) flush 5-40 mL  5-40 mL IntraVENous Q8H    sodium chloride (NS) flush 5-40 mL  5-40 mL IntraVENous PRN     ______________________________________________________________________  EXPECTED LENGTH OF STAY: 4d 4h  ACTUAL LENGTH OF STAY:          5                 Payton Hooker MD

## 2020-02-28 NOTE — PROGRESS NOTES
Bedside and Verbal shift change report given to Tania Del Toro RN (oncoming nurse) by Leigh Ann Wynn RN (offgoing nurse). Report included the following information SBAR, ED Summary, Intake/Output, MAR and Recent Results.

## 2020-02-28 NOTE — PROGRESS NOTES
Problem: Risk for Spread of Infection  Goal: Prevent transmission of infectious organism to others  Description  Prevent the transmission of infectious organisms to other patients, staff members, and visitors. Outcome: Progressing Towards Goal     Problem: Falls - Risk of  Goal: *Absence of Falls  Description  Document [address] Fall Risk and appropriate interventions in the flowsheet.   Outcome: Progressing Towards Goal  Note: Fall Risk Interventions:  Mobility Interventions: Communicate number of staff needed for ambulation/transfer, Patient to call before getting OOB, PT Consult for mobility concerns, PT Consult for assist device competence, Strengthening exercises (ROM-active/passive), Utilize walker, cane, or other assistive device         Medication Interventions: Evaluate medications/consider consulting pharmacy, Patient to call before getting OOB, Teach patient to arise slowly    Elimination Interventions: Call light in reach, Patient to call for help with toileting needs, Toileting schedule/hourly rounds              Problem: Breathing Pattern - Ineffective  Goal: *Absence of hypoxia  Outcome: Progressing Towards Goal

## 2020-03-01 NOTE — PROGRESS NOTES
Transitional Care Team: Discharge TIFFANIEG Note    Met Ms. Tran at discharge while she was waiting for her ride home. Introduced myself and the [facility] and its goals. 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 For this reason, [facility] information was not given to her. She states she is feeling better--was diagnosed with flu. She knows that an abnormality was found with her CBC and she is to F/U with a hematologist after discharge. She also needs a F/U visit with her PCP Dr. Mei Singer has not yet been scheduled. Because of her CT chest results of hyperinflation, I also recommended she be evaluated by a pulmonologist.  She has hx of smoking--per EMR notes, is a self-reported \"light smoker\" currently. Encouraged her to have a conversation with her PCP to see if she is eligible for a low-dose CT for lung cancer screening. She reports that she knows her only new medication is an Albuterol inhaler. This was confirmed by me. Left my card with Ms. Tran for any F/U questions/concerns.

## 2020-03-03 ENCOUNTER — PATIENT OUTREACH (OUTPATIENT)
Dept: INTERNAL MEDICINE CLINIC | Age: 70
End: 2020-03-03

## 2020-03-03 NOTE — PROGRESS NOTES
Hospital Discharge Follow-Up      Date/Time:  3/3/2020 10:39 AM  Stephanie Oleary. Jonathon Wilson, RN  Care Transition Nurse  [phone]    Patient was admitted to [facility] on 2/22 and discharged on 2/28 for  DX. INFLUENZA (BUNDLE RESP.5/28). The physician discharge summary was available at the time of outreach. Patient was contacted within 2 business days of discharge. [facility]    Top Challenges reviewed with the provider     Advance Care Planning:   Does patient have an Advance Directive:  not on file     outpatient pulmonary function test    urged to quit smoking. Component      Latest Ref Rng & Units 2/27/2020 2/27/2020 2/24/2020 2/24/2020          HGB      11.5 - 16.0 g/dL 8.8 (L)   9.5 (L)   HCT      35.0 - 47.0 % 28.6 (L)   30.8 (L)     2/22/20  NT pro-BNP 1,005           Method of communication with provider :ccLink    Was this a readmission? no       Disease Specific:   N/A      Medication(s):   New Medications at Discharge:   albuterol sulfate 90 mcg/actuation aepb Take 1-2 Puffs by inhalation every six (6) hours as needed for Wheezing. Qty: 1 Inhaler, Refills: 0     Referral to Pharm D needed: no     Current Outpatient Medications   Medication Sig    albuterol sulfate 90 mcg/actuation aepb Take 1-2 Puffs by inhalation every six (6) hours as needed for Wheezing.     levothyroxine (SYNTHROID) 100 mcg tablet TAKE 1 TABLET EVERY DAY BEFORE BREAKFAST    permethrin (ACTICIN) 5 % topical cream permethrin 5 % topical cream   APPLY (THOROUGHLY MASSAGE INTO SKIN FROM HEAD TO SOLES OF FEET) BY TOPICAL ROUTE ONCE LEAVE ON FOR 8-14 HR, THEN REMOVE BY THOROUGH WASHING    clonazePAM (KLONOPIN) 0.5 mg tablet TAKE 1-2 TABLETS EACH DAY AS NEEDED FOR ANXIETY    dextroamphetamine (DEXTROSTAT) 10 mg tab TAKE 1 TABLET BY MOUTH TWICE A DAY    VIIBRYD 40 mg tab tablet TAKE 1 TABLET BY MOUTH EVERY DAY    traZODone (DESYREL) 150 mg tablet TAKE 1 TABLET EVERY NIGHT    LORazepam (ATIVAN) 1 mg tablet TAKE 1 TABLET BY MOUTH TWICE A DAY AS NEEDED    ARIPiprazole (ABILIFY) 5 mg tablet     sertraline (ZOLOFT) 100 mg tablet TAKE  1 1/2 TABLET BY MOUTH EVERY DAY    promethazine (PHENERGAN) 25 mg tablet Take 1/2-1 tablet every 6 hours as needed for nausea.  ondansetron (ZOFRAN ODT) 4 mg disintegrating tablet Take 1 Tab by mouth every eight (8) hours as needed for Nausea.  colestipol (COLESTID) 1 gram tablet Take 1 Tab by mouth two (2) times a day. (Patient not taking: Reported on 10/22/2019)    levothyroxine (SYNTHROID) 100 mcg tablet Take 1 Tab by mouth Daily (before breakfast).  Bacillus coagulans (PROBIOTIC, B. COAGULANS, PO) Take  by mouth.  naproxen sodium (ALEVE) 220 mg cap Take  by mouth. No current facility-administered medications for this visit. [facility] follow up appointment(s): No future appointments.      [facility]:  out of service area No

## 2020-04-21 ENCOUNTER — PATIENT OUTREACH (OUTPATIENT)
Dept: INTERNAL MEDICINE CLINIC | Age: 70
End: 2020-04-21

## 2020-04-21 NOTE — PROGRESS NOTES
4/21/20 CTN attempt to contact pt.; unable to reach, LVM. , will f/u in 2-3 weeks. -[facility]

## 2020-04-23 ENCOUNTER — VIRTUAL VISIT (OUTPATIENT)
Dept: INTERNAL MEDICINE CLINIC | Age: 70
End: 2020-04-23

## 2020-04-23 DIAGNOSIS — S80.862A INSECT BITE OF LEFT LOWER LEG, INITIAL ENCOUNTER: Primary | ICD-10-CM
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
## 2020-04-23 RX ORDER — CEPHALEXIN 500 MG/1
500 CAPSULE ORAL 3 TIMES DAILY
Qty: 21 CAP | Refills: 0 | Status: SHIPPED | OUTPATIENT
Start: 2020-04-23 | End: 2020-06-06

## 2020-04-23 NOTE — PROGRESS NOTES
Yudelka Lyn is a 81 y.o. female who presents with concern regarding tick bite 3 weeks ago. [place] Has a red hali and firm around bite. Clear discharge. Used neosporin. Two days ago enlarged to size of 50 cent piece. This is an established visit conducted via telemedicine with video. The patient has been instructed that this meets HIPAA criteria and acknowledges and agrees to this method of visitation. Pursuant to the emergency declaration under the [facility], [facility] waiver authority and the [employer] Act, this Virtual Visit was conducted, with patient's consent, to reduce the patient's risk of exposure to COVID-19 and provide continuity of care for an established patient. Services were provided through a video synchronous discussion virtually to substitute for in-person clinic visit.         Past Medical History:   Diagnosis Date    Anemia     Anxiety     Breast cancer ([facility] [address] 2005    right lumpectomy    Chronic pain     neck pain    Cough     Depression     Hemorrhoid     Menopause 2005    Muscle pain     Neck problem     Pneumonia     sepis    S/P radiation therapy 2005    Sleep trouble     Thyroid disease     Urine troubles     unable to controll       Family History   Problem Relation Age of Onset    Cancer Mother        Social History     Socioeconomic History    Marital status:      Spouse name: Not on file    Number of children: Not on file    Years of education: Not on file    Highest education level: Not on file   Occupational History    Not on file   Social Needs    Financial resource strain: Not on file    Food insecurity     Worry: Not on file     Inability: Not on file    Transportation needs     Medical: Not on file     Non-medical: Not on file   Tobacco Use    Smoking status: Light Tobacco Smoker     Last attempt to quit: 1/1/2010     Years since quitting: 10.3    Smokeless tobacco: Never Used   Substance and Sexual Activity    Alcohol use: Yes     Alcohol/week: 14.0 standard drinks     Types: 14 Shots of liquor per week     Frequency: 4 or more times a week     Drinks per session: 10 or more    Drug use: No    Sexual activity: Yes     Partners: Male     Birth control/protection: None   Lifestyle    Physical activity     Days per week: Not on file     Minutes per session: Not on file    Stress: Not on file   Relationships    Social connections     Talks on phone: Not on file     Gets together: Not on file     Attends Episcopalian service: Not on file     Active member of club or organization: Not on file     Attends meetings of clubs or organizations: Not on file     Relationship status: Not on file    Intimate partner violence     Fear of current or ex partner: Not on file     Emotionally abused: Not on file     Physically abused: Not on file     Forced sexual activity: Not on file   Other Topics Concern    Not on file   Social History Narrative    Not on file       Current Outpatient Medications on File Prior to Visit   Medication Sig Dispense Refill    albuterol sulfate 90 mcg/actuation aepb Take 1-2 Puffs by inhalation every six (6) hours as needed for Wheezing.  1 Inhaler 0    levothyroxine (SYNTHROID) 100 mcg tablet TAKE 1 TABLET EVERY DAY BEFORE BREAKFAST 90 Tab 3    permethrin (ACTICIN) 5 % topical cream permethrin 5 % topical cream   APPLY (THOROUGHLY MASSAGE INTO SKIN FROM HEAD TO SOLES OF FEET) BY TOPICAL ROUTE ONCE LEAVE ON FOR 8-14 HR, THEN REMOVE BY THOROUGH WASHING      clonazePAM (KLONOPIN) 0.5 mg tablet TAKE 1-2 TABLETS EACH DAY AS NEEDED FOR ANXIETY  0    dextroamphetamine (DEXTROSTAT) 10 mg tab TAKE 1 TABLET BY MOUTH TWICE A DAY  0    VIIBRYD 40 mg tab tablet TAKE 1 TABLET BY MOUTH EVERY DAY  1    traZODone (DESYREL) 150 mg tablet TAKE 1 TABLET EVERY NIGHT 90 Tab 3    LORazepam (ATIVAN) 1 mg tablet TAKE 1 TABLET BY MOUTH TWICE A DAY AS NEEDED 60 Tab 1    ARIPiprazole (ABILIFY) 5 mg tablet       sertraline (ZOLOFT) 100 mg tablet TAKE  1 1/2 TABLET BY MOUTH EVERY DAY  1    promethazine (PHENERGAN) 25 mg tablet Take 1/2-1 tablet every 6 hours as needed for nausea. 30 Tab 2    ondansetron (ZOFRAN ODT) 4 mg disintegrating tablet Take 1 Tab by mouth every eight (8) hours as needed for Nausea. 30 Tab 2    colestipol (COLESTID) 1 gram tablet Take 1 Tab by mouth two (2) times a day. (Patient not taking: Reported on 10/22/2019) 60 Tab 3    levothyroxine (SYNTHROID) 100 mcg tablet Take 1 Tab by mouth Daily (before breakfast). 90 Tab 3    Bacillus coagulans (PROBIOTIC, B. COAGULANS, PO) Take  by mouth.  naproxen sodium (ALEVE) 220 mg cap Take  by mouth. No current facility-administered medications on file prior to visit. Review of Systems  Pertinent items are noted in HPI. Objective:     Gen: well appearing female  Resp: normal respiratory effort, no audible wheezing. CV: patient does not feel palpitations or heart irregularity  Neuro: Alert and oriented, able to answer questions without difficulty  Skin:  Ecchymoses right lower leg with open wound. Assessment/Plan:       ICD-10-CM ICD-9-CM    1. Insect bite of left lower leg, initial encounter Q83.992I 916.4 cephALEXin (KEFLEX) 500 mg capsule    W57. Jessica Salas C975.0        This was a telemedicine visit with video.         Linda Santana MD

## 2020-05-08 ENCOUNTER — PATIENT OUTREACH (OUTPATIENT)
Dept: INTERNAL MEDICINE CLINIC | Age: 70
End: 2020-05-08

## 2020-05-08 NOTE — PROGRESS NOTES
CTN unable to contact pt. left message with spouse to return call. CTN will f/u with pt. 1-2 weeks. -[facility]

## 2020-06-03 ENCOUNTER — PATIENT OUTREACH (OUTPATIENT)
Dept: INTERNAL MEDICINE CLINIC | Age: 70
End: 2020-06-03

## 2020-06-06 ENCOUNTER — APPOINTMENT (OUTPATIENT)
Dept: GENERAL RADIOLOGY | Age: 70
End: 2020-06-06
Attending: STUDENT IN AN ORGANIZED HEALTH CARE EDUCATION/TRAINING PROGRAM
Payer: MEDICARE

## 2020-06-06 ENCOUNTER — HOSPITAL ENCOUNTER (EMERGENCY)
Age: 70
Discharge: HOME | End: 2020-06-06
Attending: STUDENT IN AN ORGANIZED HEALTH CARE EDUCATION/TRAINING PROGRAM | Admitting: STUDENT IN AN ORGANIZED HEALTH CARE EDUCATION/TRAINING PROGRAM
Payer: MEDICARE

## 2020-06-06 VITALS
HEART RATE: 97 BPM | TEMPERATURE: 101.5 F | BODY MASS INDEX: 22.8 KG/M2 | HEIGHT: 62 IN | WEIGHT: 123.9 LBS | RESPIRATION RATE: 16 BRPM | OXYGEN SATURATION: 93 % | DIASTOLIC BLOOD PRESSURE: 61 MMHG | SYSTOLIC BLOOD PRESSURE: 140 MMHG

## 2020-06-06 DIAGNOSIS — Z20.822 SUSPECTED 2019 NOVEL CORONAVIRUS INFECTION: ICD-10-CM

## 2020-06-06 DIAGNOSIS — J18.9 PNEUMONIA OF RIGHT LOWER LOBE DUE TO INFECTIOUS ORGANISM: Primary | ICD-10-CM

## 2020-06-06 PROCEDURE — 99284 EMERGENCY DEPT VISIT MOD MDM: CPT

## 2020-06-06 PROCEDURE — 36415 COLL VENOUS BLD VENIPUNCTURE: CPT

## 2020-06-06 PROCEDURE — 87635 SARS-COV-2 COVID-19 AMP PRB: CPT

## 2020-06-06 PROCEDURE — 71045 X-RAY EXAM CHEST 1 VIEW: CPT

## 2020-06-06 PROCEDURE — 74011250637 HC RX REV CODE- 250/637: Performed by: STUDENT IN AN ORGANIZED HEALTH CARE EDUCATION/TRAINING PROGRAM

## 2020-06-06 RX ORDER — ACETAMINOPHEN 325 MG/1
650 TABLET ORAL ONCE
Status: COMPLETED | OUTPATIENT
Start: 2020-06-06 | End: 2020-06-06

## 2020-06-06 RX ORDER — FLUOXETINE HYDROCHLORIDE 40 MG/1
CAPSULE ORAL
COMMUNITY
Start: 2020-03-16 | End: 2020-06-16

## 2020-06-06 RX ORDER — ALBUTEROL SULFATE 90 UG/1
AEROSOL, METERED RESPIRATORY (INHALATION)
COMMUNITY
Start: 2020-02-29 | End: 2021-02-03

## 2020-06-06 RX ORDER — AZITHROMYCIN 250 MG/1
TABLET, FILM COATED ORAL
Qty: 6 TAB | Refills: 0 | Status: SHIPPED | OUTPATIENT
Start: 2020-06-06 | End: 2020-06-16

## 2020-06-06 RX ADMIN — ACETAMINOPHEN 650 MG: 325 TABLET ORAL at 10:36

## 2020-06-06 NOTE — ED NOTES
Dr. Alireza Morris and I have reviewed discharge instructions with the patient. The patient verbalized understanding. Pt. Has d/c paperwork and is ambulatory out of treatment area.

## 2020-06-06 NOTE — ED TRIAGE NOTES
Pt. Complains of fever last night and headache that started yesterday afternoon. PT. Also complains of cough that started yesterday as well.

## 2020-06-06 NOTE — ED PROVIDER NOTES
Jay Jay Grover is a 81 y.o. female with past medical history notable for bibasilar pneumonia, pneumonia related to sepsis at a different time, tobacco abuse, chronic neck pain, breast cancer status post lumpectomy and radiation in , anemia, anxiety presenting with shortness of breath, nonproductive cough, headache associated with cough which started yesterday. Felt that the cough started first in the morning. No known contacts with coronavirus disease. Denies sore throat, vomiting, abdominal pain, diarrhea. No leg swelling, orthopnea. Took an albuterol treatment, had minimal improvement. Worse symptoms with ambulation. No rest dyspnea.             Past Medical History:   Diagnosis Date    Anemia     Anxiety     Breast cancer ([facility] [address] 2005    right lumpectomy    Chronic pain     neck pain    Cough     Depression     Hemorrhoid     Menopause 2005    Muscle pain     Neck problem     Pneumonia     sepis    S/P radiation therapy 2005    Sleep trouble     Thyroid disease     Urine troubles     unable to controll       Past Surgical History:   Procedure Laterality Date    HX BREAST LUMPECTOMY Right 2005    HX  SECTION      HX THYROIDECTOMY      IMPLANT BREAST SILICONE/EQ Bilateral 0765         Family History:   Problem Relation Age of Onset    Cancer Mother        Social History     Socioeconomic History    Marital status:      Spouse name: Not on file    Number of children: Not on file    Years of education: Not on file    Highest education level: Not on file   Occupational History    Not on file   Social Needs    Financial resource strain: Not on file    Food insecurity     Worry: Not on file     Inability: Not on file    Transportation needs     Medical: Not on file     Non-medical: Not on file   Tobacco Use    Smoking status: Light Tobacco Smoker     Last attempt to quit: 2010     Years since quitting: 10.4    Smokeless tobacco: Current User   Substance and Sexual Activity    Alcohol use: Yes     Alcohol/week: 14.0 standard drinks     Types: 14 Shots of liquor per week     Frequency: 4 or more times a week     Drinks per session: 10 or more    Drug use: No    Sexual activity: Yes     Partners: Male     Birth control/protection: None   Lifestyle    Physical activity     Days per week: Not on file     Minutes per session: Not on file    Stress: Not on file   Relationships    Social connections     Talks on phone: Not on file     Gets together: Not on file     Attends Evangelical service: Not on file     Active member of club or organization: Not on file     Attends meetings of clubs or organizations: Not on file     Relationship status: Not on file    Intimate partner violence     Fear of current or ex partner: Not on file     Emotionally abused: Not on file     Physically abused: Not on file     Forced sexual activity: Not on file   Other Topics Concern    Not on file   Social History Narrative    Not on file         ALLERGIES: Patient has no known allergies. Review of Systems   Constitutional: Positive for chills, fatigue and fever. HENT: Negative for tinnitus. Eyes: Negative for photophobia. Respiratory: Positive for cough and shortness of breath. Cardiovascular: Negative for chest pain. Gastrointestinal: Negative for abdominal pain and nausea. Genitourinary: Negative for dysuria. Musculoskeletal: Negative for back pain. Neurological: Positive for headaches. Psychiatric/Behavioral: Negative for confusion. All other systems reviewed and are negative. Vitals:    06/06/20 0945 06/06/20 0953   BP: 122/65 140/60   Pulse: (!) 104 99   Resp: 16    Temp: (!) 101.5 °F (38.6 °C)    SpO2: 93% 95%   Weight: 56.2 kg (123 lb 14.4 oz)    Height: 5' 2\" (1.575 m)             Physical Exam  Vitals signs reviewed. Constitutional:       General: She is not in acute distress. Appearance: She is diaphoretic (  Slightly).    HENT:      Head: Normocephalic and atraumatic. Mouth/Throat:      Mouth: Mucous membranes are moist.      Pharynx: Oropharynx is clear. Cardiovascular:      Rate and Rhythm: Regular rhythm. Tachycardia present. Heart sounds: Normal heart sounds. Pulmonary:      Effort: Pulmonary effort is normal.      Breath sounds: Normal breath sounds. Abdominal:      Tenderness: There is no abdominal tenderness. There is no guarding or rebound. Musculoskeletal: Normal range of motion. Right lower leg: No edema. Left lower leg: No edema. Skin:     General: Skin is warm. Capillary Refill: Capillary refill takes less than 2 seconds. Neurological:      General: No focal deficit present. Mental Status: She is alert and oriented to person, place, and time. Cranial Nerves: No cranial nerve deficit. Motor: No weakness. Gait: Gait normal.   Psychiatric:         Mood and Affect: Mood normal.          MDM  Number of Diagnoses or Management Options  Pneumonia of right lower lobe due to infectious organism:   Suspected 2019 novel coronavirus infection:   Diagnosis management comments: Patient with concern for new onset dyspnea, fever, developing pneumonia on x-ray, may be coronavirus related infiltrate. Given instructions to isolate until the results of her testing have been obtained. Strict return precautions for worsening symptoms were provided to the patient.          Procedures

## 2020-06-08 ENCOUNTER — PATIENT OUTREACH (OUTPATIENT)
Dept: FAMILY MEDICINE CLINIC | Age: 70
End: 2020-06-08

## 2020-06-08 ENCOUNTER — VIRTUAL VISIT (OUTPATIENT)
Dept: INTERNAL MEDICINE CLINIC | Age: 70
End: 2020-06-08

## 2020-06-08 ENCOUNTER — TELEPHONE (OUTPATIENT)
Dept: INTERNAL MEDICINE CLINIC | Age: 70
End: 2020-06-08

## 2020-06-08 DIAGNOSIS — J18.9 PNEUMONIA OF RIGHT LOWER LOBE DUE TO INFECTIOUS ORGANISM: Primary | ICD-10-CM

## 2020-06-08 LAB
SARS-COV-2, COV2NT: NOT DETECTED
SPECIMEN SOURCE, FCOV2M: NORMAL

## 2020-06-08 NOTE — TELEPHONE ENCOUNTER
Spoke with patient. Two pt identifiers confirmed. Patient advised that Dr. Leidy Rey would like for her to schedule a virtual visit to discuss her symptoms. Patient offered an appointment today, 06/08/2020. Appointment accepted. Patient advised if anything changes or if unable to keep this appointment to call the office  Pt verbalized understanding of information discussed w/ no further questions at this time.

## 2020-06-08 NOTE — PROGRESS NOTES
Patient contacted regarding COVID-19 exposure. Discussed COVID-19 related testing which was pending at this time. Test results were pending. Patient informed of results, if available? no     Care Transition Nurse/ Ambulatory Care Manager contacted the patient by telephone to perform post discharge assessment. Verified name and  with patient as identifiers. Provided introduction to self, and explanation of the CTN/ACM role, and reason for call due to risk factors for infection and/or exposure to COVID-19. Symptoms reviewed with patient who verbalized the following symptoms: no new symptoms and no worsening symptoms. Due to no new or worsening symptoms encounter was not routed to provider for escalation. Discussed follow-up appointments. If no appointment was previously scheduled, appointment scheduling offered: patient had telemedicine appointment with [facility] follow up appointment(s): No future appointments. Non-[facility] follow up appointment(s): PCP      Patient has following risk factors of: pneumonia. CTN/ACM reviewed discharge instructions, medical action plan and red flags such as increased shortness of breath, increasing fever and signs of decompensation with patient who verbalized understanding. Discussed exposure protocols and quarantine with CDC Guidelines What to do if you are sick with coronavirus disease 2019.  Patient was given an opportunity for questions and concerns. The patient agrees to contact the [facility] exposure line [phone], [facility] 106  ([phone]) and PCP office for questions related to their healthcare. CTN/ACM provided contact information for future needs. Reviewed and educated patient on any new and changed medications related to discharge diagnosis. Patient/family/caregiver [facility] and agrees to enroll yes  Patient's preferred e-mail:  [email or web address]  Patient's preferred phone number: [phone]  Based on Loop alert triggers, patient will be contacted by nurse care manager for worsening symptoms. Pt will be further monitored by COVID Loop Team based on severity of symptoms and risk factors.

## 2020-06-08 NOTE — TELEPHONE ENCOUNTER
#[phone]  Pt states she went to urgent care and has pneumonia in lower lobe. She has a cough and SOB. Pt is not sleeping she states and not thinking well. Please call pt as soon as possible. Thanks.

## 2020-06-08 NOTE — PROGRESS NOTES
Norman Wong is a 81 y.o. female who presents with concern of cough, fever, headache. Reports fatigue and SOB. Tested for COVID 19, pending. Diagnosed with RLL pneumonia. Given zpak and taking tylenol. Appetite is down, increased fluids. Using albuterol prn. SpO2 90-93%. Lives with . This is an established visit conducted via telemedicine with video. The patient has been instructed that this meets HIPAA criteria and acknowledges and agrees to this method of visitation. Pursuant to the emergency declaration under the [facility]note 2[facility], [facility] waiver authority and the [employer] Act, this Virtual Visit was conducted, with patient's consent, to reduce the patient's risk of exposure to COVID-19 and provide continuity of care for an established patient. Services were provided through a video synchronous discussion virtually to substitute for in-person clinic visit.         Past Medical History:   Diagnosis Date    Anemia     Anxiety     Breast cancer ([facility] [address] 2005    right lumpectomy    Chronic pain     neck pain    Cough     Depression     Hemorrhoid     Menopause 2005    Muscle pain     Neck problem     Pneumonia     sepis    S/P radiation therapy 2005    Sleep trouble     Thyroid disease     Urine troubles     unable to controll       Family History   Problem Relation Age of Onset    Cancer Mother        Social History     Socioeconomic History    Marital status:      Spouse name: Not on file    Number of children: Not on file    Years of education: Not on file    Highest education level: Not on file   Occupational History    Not on file   Social Needs    Financial resource strain: Not on file    Food insecurity     Worry: Not on file     Inability: Not on file    Transportation needs     Medical: Not on file     Non-medical: Not on file   Tobacco Use    Smoking status: Light Tobacco Smoker     Last attempt to quit: 1/1/2010     Years since quitting: 10.4    Smokeless tobacco: Current User   Substance and Sexual Activity    Alcohol use: Yes     Alcohol/week: 14.0 standard drinks     Types: 14 Shots of liquor per week     Frequency: 4 or more times a week     Drinks per session: 10 or more    Drug use: No    Sexual activity: Yes     Partners: Male     Birth control/protection: None   Lifestyle    Physical activity     Days per week: Not on file     Minutes per session: Not on file    Stress: Not on file   Relationships    Social connections     Talks on phone: Not on file     Gets together: Not on file     Attends Confucianist service: Not on file     Active member of club or organization: Not on file     Attends meetings of clubs or organizations: Not on file     Relationship status: Not on file    Intimate partner violence     Fear of current or ex partner: Not on file     Emotionally abused: Not on file     Physically abused: Not on file     Forced sexual activity: Not on file   Other Topics Concern    Not on file   Social History Narrative    Not on file       Current Outpatient Medications on File Prior to Visit   Medication Sig Dispense Refill    FLUoxetine (PROzac) 40 mg capsule TAKE 1 CAPSULE BY MOUTH EVERY DAY      albuterol (PROVENTIL HFA, VENTOLIN HFA, PROAIR HFA) 90 mcg/actuation inhaler INHALE 1 2 PUFFS BY MOUTH EVERY 6 HOURS AS NEEDED FOR WHEEZING      azithromycin (ZITHROMAX) 250 mg tablet Take 2 tabs on day 1, take 1 tab daily days 2 through 5 6 Tab 0    albuterol sulfate 90 mcg/actuation aepb Take 1-2 Puffs by inhalation every six (6) hours as needed for Wheezing.  1 Inhaler 0    levothyroxine (SYNTHROID) 100 mcg tablet TAKE 1 TABLET EVERY DAY BEFORE BREAKFAST 90 Tab 3    LORazepam (ATIVAN) 1 mg tablet TAKE 1 TABLET BY MOUTH TWICE A DAY AS NEEDED 60 Tab 1    ARIPiprazole (ABILIFY) 5 mg tablet       colestipol (COLESTID) 1 gram tablet Take 1 Tab by mouth two (2) times a day. 60 Tab 3    naproxen sodium (ALEVE) 220 mg cap Take  by mouth.  permethrin (ACTICIN) 5 % topical cream permethrin 5 % topical cream   APPLY (THOROUGHLY MASSAGE INTO SKIN FROM HEAD TO SOLES OF FEET) BY TOPICAL ROUTE ONCE LEAVE ON FOR 8-14 HR, THEN REMOVE BY THOROUGH WASHING      clonazePAM (KLONOPIN) 0.5 mg tablet TAKE 1-2 TABLETS EACH DAY AS NEEDED FOR ANXIETY  0    dextroamphetamine (DEXTROSTAT) 10 mg tab TAKE 1 TABLET BY MOUTH TWICE A DAY  0    VIIBRYD 40 mg tab tablet TAKE 1 TABLET BY MOUTH EVERY DAY  1    traZODone (DESYREL) 150 mg tablet TAKE 1 TABLET EVERY NIGHT 90 Tab 3    sertraline (ZOLOFT) 100 mg tablet TAKE  1 1/2 TABLET BY MOUTH EVERY DAY  1    promethazine (PHENERGAN) 25 mg tablet Take 1/2-1 tablet every 6 hours as needed for nausea. 30 Tab 2    ondansetron (ZOFRAN ODT) 4 mg disintegrating tablet Take 1 Tab by mouth every eight (8) hours as needed for Nausea. 30 Tab 2    Bacillus coagulans (PROBIOTIC, B. COAGULANS, PO) Take  by mouth. No current facility-administered medications on file prior to visit. Review of Systems  Pertinent items are noted in HPI. Objective:     Gen: mildly ill appearing female, no SOB at rest.   HEENT: normal conjunctiva, no audible congestion, patient does not see oral erythema, has MMM  Neck: patient does not feel enlarged or tender LAD or masses  Resp: normal respiratory effort, no audible wheezing. CV: patient does not feel palpitations or heart irregularity  Abd: patient does not feel abdominal tenderness or mass, patient does not notice distension  Extrem: patient does not see swelling in ankles or joints. Neuro: Alert and oriented, able to answer questions without difficulty        Assessment/Plan:       ICD-10-CM ICD-9-CM    1. Pneumonia of right lower lobe due to infectious organism J18.9 486    COVID test pending. Continue increased fluids and rest.  Use inhaler, tylenol, and finish zpak.      This was a telemedicine visit with video. Ximena Lopez MD    Follow-up and Dispositions    · Return if symptoms worsen or fail to improve.

## 2020-06-09 ENCOUNTER — HOSPITAL ENCOUNTER (EMERGENCY)
Age: 70
Discharge: STILL ADMITTED OR ADMINISTRATIVE | End: 2020-06-09

## 2020-06-10 ENCOUNTER — PATIENT OUTREACH (OUTPATIENT)
Dept: INTERNAL MEDICINE CLINIC | Age: 70
End: 2020-06-10

## 2020-06-10 NOTE — PROGRESS NOTES
Yellow alert noted in remote symptom monitoring program. Messaged patient to notify Clay White if symptoms have worsened since yesterday or if they would like to have a nurse reach out.

## 2020-06-12 ENCOUNTER — APPOINTMENT (OUTPATIENT)
Dept: CT IMAGING | Age: 70
DRG: 867 | End: 2020-06-12
Attending: STUDENT IN AN ORGANIZED HEALTH CARE EDUCATION/TRAINING PROGRAM
Payer: MEDICARE

## 2020-06-12 ENCOUNTER — HOSPITAL ENCOUNTER (INPATIENT)
Age: 70
LOS: 4 days | Discharge: HOME HEALTH | DRG: 867 | End: 2020-06-16
Attending: STUDENT IN AN ORGANIZED HEALTH CARE EDUCATION/TRAINING PROGRAM | Admitting: HOSPITALIST
Payer: MEDICARE

## 2020-06-12 ENCOUNTER — APPOINTMENT (OUTPATIENT)
Dept: GENERAL RADIOLOGY | Age: 70
DRG: 867 | End: 2020-06-12
Attending: STUDENT IN AN ORGANIZED HEALTH CARE EDUCATION/TRAINING PROGRAM
Payer: MEDICARE

## 2020-06-12 DIAGNOSIS — J98.8 VIRAL RESPIRATORY ILLNESS: ICD-10-CM

## 2020-06-12 DIAGNOSIS — R74.01 TRANSAMINITIS: ICD-10-CM

## 2020-06-12 DIAGNOSIS — B97.89 VIRAL RESPIRATORY ILLNESS: ICD-10-CM

## 2020-06-12 DIAGNOSIS — J90 PLEURAL EFFUSION, BILATERAL: Primary | ICD-10-CM

## 2020-06-12 DIAGNOSIS — F41.9 ANXIETY AND DEPRESSION: ICD-10-CM

## 2020-06-12 DIAGNOSIS — R09.02 HYPOXIA: ICD-10-CM

## 2020-06-12 DIAGNOSIS — F32.A ANXIETY AND DEPRESSION: ICD-10-CM

## 2020-06-12 PROBLEM — R06.02 SOB (SHORTNESS OF BREATH): Status: ACTIVE | Noted: 2020-06-12

## 2020-06-12 LAB
ALBUMIN SERPL-MCNC: 2.4 G/DL (ref 3.5–5)
ALBUMIN/GLOB SERPL: 0.6 {RATIO} (ref 1.1–2.2)
ALP SERPL-CCNC: 136 U/L (ref 45–117)
ALT SERPL-CCNC: 294 U/L (ref 12–78)
ANION GAP SERPL CALC-SCNC: 6 MMOL/L (ref 5–15)
APPEARANCE UR: CLEAR
AST SERPL-CCNC: 169 U/L (ref 15–37)
ATRIAL RATE: 84 BPM
B PERT DNA SPEC QL NAA+PROBE: NOT DETECTED
BACTERIA URNS QL MICRO: NEGATIVE /HPF
BASOPHILS # BLD: 0 K/UL (ref 0–0.1)
BASOPHILS NFR BLD: 0 % (ref 0–1)
BILIRUB SERPL-MCNC: 0.4 MG/DL (ref 0.2–1)
BILIRUB UR QL: NEGATIVE
BNP SERPL-MCNC: 1644 PG/ML (ref 0–125)
BORDETELLA PARAPERTUSSIS PCR, BORPAR: NOT DETECTED
BUN SERPL-MCNC: 19 MG/DL (ref 6–20)
BUN/CREAT SERPL: 19 (ref 12–20)
C PNEUM DNA SPEC QL NAA+PROBE: NOT DETECTED
CALCIUM SERPL-MCNC: 8.4 MG/DL (ref 8.5–10.1)
CALCULATED P AXIS, ECG09: 40 DEGREES
CALCULATED R AXIS, ECG10: 16 DEGREES
CALCULATED T AXIS, ECG11: 60 DEGREES
CHLORIDE SERPL-SCNC: 101 MMOL/L (ref 97–108)
CO2 SERPL-SCNC: 31 MMOL/L (ref 21–32)
COLOR UR: ABNORMAL
COMMENT, HOLDF: NORMAL
CREAT SERPL-MCNC: 1.01 MG/DL (ref 0.55–1.02)
DIAGNOSIS, 93000: NORMAL
DIFFERENTIAL METHOD BLD: ABNORMAL
EOSINOPHIL # BLD: 0 K/UL (ref 0–0.4)
EOSINOPHIL NFR BLD: 0 % (ref 0–7)
EPITH CASTS URNS QL MICRO: ABNORMAL /LPF
ERYTHROCYTE [DISTWIDTH] IN BLOOD BY AUTOMATED COUNT: 14 % (ref 11.5–14.5)
FLUAV H1 2009 PAND RNA SPEC QL NAA+PROBE: NOT DETECTED
FLUAV H1 RNA SPEC QL NAA+PROBE: NOT DETECTED
FLUAV H3 RNA SPEC QL NAA+PROBE: NOT DETECTED
FLUAV SUBTYP SPEC NAA+PROBE: NOT DETECTED
FLUBV RNA SPEC QL NAA+PROBE: NOT DETECTED
GLOBULIN SER CALC-MCNC: 3.7 G/DL (ref 2–4)
GLUCOSE SERPL-MCNC: 146 MG/DL (ref 65–100)
GLUCOSE UR STRIP.AUTO-MCNC: NEGATIVE MG/DL
HADV DNA SPEC QL NAA+PROBE: NOT DETECTED
HCOV 229E RNA SPEC QL NAA+PROBE: NOT DETECTED
HCOV HKU1 RNA SPEC QL NAA+PROBE: NOT DETECTED
HCOV NL63 RNA SPEC QL NAA+PROBE: NOT DETECTED
HCOV OC43 RNA SPEC QL NAA+PROBE: NOT DETECTED
HCT VFR BLD AUTO: 31.1 % (ref 35–47)
HETEROPH AB BLD QL IA: NEGATIVE
HGB BLD-MCNC: 10 G/DL (ref 11.5–16)
HGB UR QL STRIP: ABNORMAL
HMPV RNA SPEC QL NAA+PROBE: NOT DETECTED
HPIV1 RNA SPEC QL NAA+PROBE: NOT DETECTED
HPIV2 RNA SPEC QL NAA+PROBE: NOT DETECTED
HPIV3 RNA SPEC QL NAA+PROBE: NOT DETECTED
HPIV4 RNA SPEC QL NAA+PROBE: NOT DETECTED
IMM GRANULOCYTES # BLD AUTO: 0 K/UL
IMM GRANULOCYTES NFR BLD AUTO: 0 %
INR PPP: 1.1 (ref 0.9–1.1)
KETONES UR QL STRIP.AUTO: NEGATIVE MG/DL
LACTATE SERPL-SCNC: 1.5 MMOL/L (ref 0.4–2)
LEUKOCYTE ESTERASE UR QL STRIP.AUTO: NEGATIVE
LYMPHOCYTES # BLD: 2.5 K/UL (ref 0.8–3.5)
LYMPHOCYTES NFR BLD: 28 % (ref 12–49)
M PNEUMO DNA SPEC QL NAA+PROBE: NOT DETECTED
MAGNESIUM SERPL-MCNC: 2 MG/DL (ref 1.6–2.4)
MCH RBC QN AUTO: 33.2 PG (ref 26–34)
MCHC RBC AUTO-ENTMCNC: 32.2 G/DL (ref 30–36.5)
MCV RBC AUTO: 103.3 FL (ref 80–99)
MONOCYTES # BLD: 0.3 K/UL (ref 0–1)
MONOCYTES NFR BLD: 3 % (ref 5–13)
NEUTS SEG # BLD: 6.1 K/UL (ref 1.8–8)
NEUTS SEG NFR BLD: 69 % (ref 32–75)
NITRITE UR QL STRIP.AUTO: NEGATIVE
NRBC # BLD: 0 K/UL (ref 0–0.01)
NRBC BLD-RTO: 0 PER 100 WBC
P-R INTERVAL, ECG05: 146 MS
PH UR STRIP: 5.5 [PH] (ref 5–8)
PLATELET # BLD AUTO: 159 K/UL (ref 150–400)
PMV BLD AUTO: 12.2 FL (ref 8.9–12.9)
POTASSIUM SERPL-SCNC: 3.3 MMOL/L (ref 3.5–5.1)
PROT SERPL-MCNC: 6.1 G/DL (ref 6.4–8.2)
PROT UR STRIP-MCNC: NEGATIVE MG/DL
PROTHROMBIN TIME: 10.9 SEC (ref 9–11.1)
Q-T INTERVAL, ECG07: 386 MS
QRS DURATION, ECG06: 86 MS
QTC CALCULATION (BEZET), ECG08: 456 MS
RBC # BLD AUTO: 3.01 M/UL (ref 3.8–5.2)
RBC #/AREA URNS HPF: ABNORMAL /HPF (ref 0–5)
RBC MORPH BLD: ABNORMAL
RSV RNA SPEC QL NAA+PROBE: NOT DETECTED
RV+EV RNA SPEC QL NAA+PROBE: NOT DETECTED
SAMPLES BEING HELD,HOLD: NORMAL
SODIUM SERPL-SCNC: 138 MMOL/L (ref 136–145)
SP GR UR REFRACTOMETRY: >1.03 (ref 1–1.03)
TROPONIN I SERPL-MCNC: <0.05 NG/ML
UR CULT HOLD, URHOLD: NORMAL
UROBILINOGEN UR QL STRIP.AUTO: 1 EU/DL (ref 0.2–1)
VENTRICULAR RATE, ECG03: 84 BPM
WBC # BLD AUTO: 8.9 K/UL (ref 3.6–11)
WBC URNS QL MICRO: ABNORMAL /HPF (ref 0–4)

## 2020-06-12 PROCEDURE — 96375 TX/PRO/DX INJ NEW DRUG ADDON: CPT

## 2020-06-12 PROCEDURE — 84484 ASSAY OF TROPONIN QUANT: CPT

## 2020-06-12 PROCEDURE — 83880 ASSAY OF NATRIURETIC PEPTIDE: CPT

## 2020-06-12 PROCEDURE — 87040 BLOOD CULTURE FOR BACTERIA: CPT

## 2020-06-12 PROCEDURE — 93005 ELECTROCARDIOGRAM TRACING: CPT

## 2020-06-12 PROCEDURE — 99285 EMERGENCY DEPT VISIT HI MDM: CPT

## 2020-06-12 PROCEDURE — 74011250636 HC RX REV CODE- 250/636: Performed by: STUDENT IN AN ORGANIZED HEALTH CARE EDUCATION/TRAINING PROGRAM

## 2020-06-12 PROCEDURE — 65660000000 HC RM CCU STEPDOWN

## 2020-06-12 PROCEDURE — 74011000258 HC RX REV CODE- 258: Performed by: STUDENT IN AN ORGANIZED HEALTH CARE EDUCATION/TRAINING PROGRAM

## 2020-06-12 PROCEDURE — 96360 HYDRATION IV INFUSION INIT: CPT

## 2020-06-12 PROCEDURE — 71275 CT ANGIOGRAPHY CHEST: CPT

## 2020-06-12 PROCEDURE — 85025 COMPLETE CBC W/AUTO DIFF WBC: CPT

## 2020-06-12 PROCEDURE — 96361 HYDRATE IV INFUSION ADD-ON: CPT

## 2020-06-12 PROCEDURE — 83605 ASSAY OF LACTIC ACID: CPT

## 2020-06-12 PROCEDURE — 80053 COMPREHEN METABOLIC PANEL: CPT

## 2020-06-12 PROCEDURE — 87635 SARS-COV-2 COVID-19 AMP PRB: CPT

## 2020-06-12 PROCEDURE — 86308 HETEROPHILE ANTIBODY SCREEN: CPT

## 2020-06-12 PROCEDURE — 36415 COLL VENOUS BLD VENIPUNCTURE: CPT

## 2020-06-12 PROCEDURE — 85610 PROTHROMBIN TIME: CPT

## 2020-06-12 PROCEDURE — 0100U RESPIRATORY PANEL,PCR,NASOPHARYNGEAL: CPT

## 2020-06-12 PROCEDURE — 96374 THER/PROPH/DIAG INJ IV PUSH: CPT

## 2020-06-12 PROCEDURE — 71045 X-RAY EXAM CHEST 1 VIEW: CPT

## 2020-06-12 PROCEDURE — 83735 ASSAY OF MAGNESIUM: CPT

## 2020-06-12 PROCEDURE — 81001 URINALYSIS AUTO W/SCOPE: CPT

## 2020-06-12 PROCEDURE — 74011250636 HC RX REV CODE- 250/636: Performed by: HOSPITALIST

## 2020-06-12 PROCEDURE — 74011636320 HC RX REV CODE- 636/320: Performed by: STUDENT IN AN ORGANIZED HEALTH CARE EDUCATION/TRAINING PROGRAM

## 2020-06-12 RX ORDER — SODIUM CHLORIDE 0.9 % (FLUSH) 0.9 %
10 SYRINGE (ML) INJECTION
Status: COMPLETED | OUTPATIENT
Start: 2020-06-12 | End: 2020-06-12

## 2020-06-12 RX ORDER — ZINC SULFATE 50(220)MG
1 CAPSULE ORAL DAILY
Status: DISCONTINUED | OUTPATIENT
Start: 2020-06-13 | End: 2020-06-13

## 2020-06-12 RX ORDER — SODIUM CHLORIDE 0.9 % (FLUSH) 0.9 %
5-40 SYRINGE (ML) INJECTION EVERY 8 HOURS
Status: DISCONTINUED | OUTPATIENT
Start: 2020-06-12 | End: 2020-06-16 | Stop reason: HOSPADM

## 2020-06-12 RX ORDER — ONDANSETRON 2 MG/ML
4 INJECTION INTRAMUSCULAR; INTRAVENOUS
Status: DISCONTINUED | OUTPATIENT
Start: 2020-06-12 | End: 2020-06-16 | Stop reason: HOSPADM

## 2020-06-12 RX ORDER — ONDANSETRON 2 MG/ML
4 INJECTION INTRAMUSCULAR; INTRAVENOUS
Status: COMPLETED | OUTPATIENT
Start: 2020-06-12 | End: 2020-06-12

## 2020-06-12 RX ORDER — NALOXONE HYDROCHLORIDE 0.4 MG/ML
0.4 INJECTION, SOLUTION INTRAMUSCULAR; INTRAVENOUS; SUBCUTANEOUS AS NEEDED
Status: DISCONTINUED | OUTPATIENT
Start: 2020-06-12 | End: 2020-06-16 | Stop reason: HOSPADM

## 2020-06-12 RX ORDER — MONTELUKAST SODIUM 4 MG/1
1 TABLET, CHEWABLE ORAL 2 TIMES DAILY
Status: DISCONTINUED | OUTPATIENT
Start: 2020-06-13 | End: 2020-06-13

## 2020-06-12 RX ORDER — FLUOXETINE HYDROCHLORIDE 20 MG/1
40 CAPSULE ORAL DAILY
Status: DISCONTINUED | OUTPATIENT
Start: 2020-06-13 | End: 2020-06-16

## 2020-06-12 RX ORDER — KETOROLAC TROMETHAMINE 30 MG/ML
30 INJECTION, SOLUTION INTRAMUSCULAR; INTRAVENOUS
Status: COMPLETED | OUTPATIENT
Start: 2020-06-12 | End: 2020-06-12

## 2020-06-12 RX ORDER — MORPHINE SULFATE 2 MG/ML
2 INJECTION, SOLUTION INTRAMUSCULAR; INTRAVENOUS
Status: COMPLETED | OUTPATIENT
Start: 2020-06-12 | End: 2020-06-12

## 2020-06-12 RX ORDER — LORAZEPAM 2 MG/ML
1 INJECTION INTRAMUSCULAR
Status: DISCONTINUED | OUTPATIENT
Start: 2020-06-12 | End: 2020-06-16 | Stop reason: HOSPADM

## 2020-06-12 RX ORDER — ENOXAPARIN SODIUM 100 MG/ML
30 INJECTION SUBCUTANEOUS EVERY 12 HOURS
Status: DISCONTINUED | OUTPATIENT
Start: 2020-06-12 | End: 2020-06-14

## 2020-06-12 RX ORDER — MORPHINE SULFATE 10 MG/ML
8 INJECTION, SOLUTION INTRAMUSCULAR; INTRAVENOUS
Status: DISCONTINUED | OUTPATIENT
Start: 2020-06-12 | End: 2020-06-13

## 2020-06-12 RX ORDER — ASCORBIC ACID 500 MG
1000 TABLET ORAL 2 TIMES DAILY
Status: DISCONTINUED | OUTPATIENT
Start: 2020-06-13 | End: 2020-06-13

## 2020-06-12 RX ORDER — SODIUM CHLORIDE 0.9 % (FLUSH) 0.9 %
5-40 SYRINGE (ML) INJECTION AS NEEDED
Status: DISCONTINUED | OUTPATIENT
Start: 2020-06-12 | End: 2020-06-16 | Stop reason: HOSPADM

## 2020-06-12 RX ORDER — ALBUTEROL SULFATE 90 UG/1
1 AEROSOL, METERED RESPIRATORY (INHALATION)
Status: DISCONTINUED | OUTPATIENT
Start: 2020-06-12 | End: 2020-06-16 | Stop reason: HOSPADM

## 2020-06-12 RX ORDER — ACETAMINOPHEN 325 MG/1
650 TABLET ORAL
Status: DISCONTINUED | OUTPATIENT
Start: 2020-06-12 | End: 2020-06-16 | Stop reason: HOSPADM

## 2020-06-12 RX ADMIN — KETOROLAC TROMETHAMINE 30 MG: 30 INJECTION, SOLUTION INTRAMUSCULAR at 15:36

## 2020-06-12 RX ADMIN — MORPHINE SULFATE 2 MG: 2 INJECTION, SOLUTION INTRAMUSCULAR; INTRAVENOUS at 18:33

## 2020-06-12 RX ADMIN — Medication 10 ML: at 15:53

## 2020-06-12 RX ADMIN — MORPHINE SULFATE 2 MG: 2 INJECTION, SOLUTION INTRAMUSCULAR; INTRAVENOUS at 20:06

## 2020-06-12 RX ADMIN — MORPHINE SULFATE 8 MG: 10 INJECTION INTRAVENOUS at 22:30

## 2020-06-12 RX ADMIN — Medication 10 ML: at 22:30

## 2020-06-12 RX ADMIN — ONDANSETRON 4 MG: 2 INJECTION INTRAMUSCULAR; INTRAVENOUS at 15:36

## 2020-06-12 RX ADMIN — SODIUM CHLORIDE 1000 ML: 900 INJECTION, SOLUTION INTRAVENOUS at 16:56

## 2020-06-12 RX ADMIN — IOPAMIDOL 80 ML: 755 INJECTION, SOLUTION INTRAVENOUS at 15:51

## 2020-06-12 RX ADMIN — SODIUM CHLORIDE 50 ML: 900 INJECTION, SOLUTION INTRAVENOUS at 15:53

## 2020-06-12 NOTE — PROGRESS NOTES
TRANSFER - IN REPORT:    Verbal report received from Thomas Webber RN(name) on [employer]  being received from [facility](unit) for routine progression of care      Report consisted of patients Situation, Background, Assessment and   Recommendations(SBAR). Information from the following report(s) Kardex, ED Summary, MAR, Recent Results, Med Rec Status and Cardiac Rhythm NSR was reviewed with the receiving nurse. Opportunity for questions and clarification was provided. Patient to arrive by EMS transport. Assessment completed upon patients arrival to unit and care assumed.

## 2020-06-12 NOTE — ED TRIAGE NOTES
TRIAGE NOTE: Patient arrived from home with c/o headaches, body aches, fevers and chills. Patient was seen here for the same a week ago, given zpack for the pnuemonia.  covid negative

## 2020-06-12 NOTE — ROUTINE PROCESS
TRANSFER - OUT REPORT: 
 
Verbal report given to Sudha Olivarez RN (name) on Radha Woods  being transferred to [facility]) for routine progression of care Report consisted of patients Situation, Background, Assessment and  
Recommendations(SBAR). Information from the following report(s) SBAR, Kardex, ED Summary and MAR was reviewed with the receiving nurse. Lines:  
Peripheral IV 06/12/20 Right Antecubital (Active) Site Assessment Clean, dry, & intact 6/12/2020  2:50 PM  
Phlebitis Assessment 0 6/12/2020  2:50 PM  
Dressing Status Clean, dry, & intact 6/12/2020  2:50 PM  
Dressing Type Transparent 6/12/2020  2:50 PM  
Hub Color/Line Status Patent;Pink 6/12/2020  2:50 PM  
Action Taken Blood drawn 6/12/2020  2:50 PM  
   
Peripheral IV 06/12/20 Left Antecubital (Active) Site Assessment Clean, dry, & intact 6/12/2020  2:51 PM  
Phlebitis Assessment 0 6/12/2020  2:51 PM  
Dressing Status Clean, dry, & intact 6/12/2020  2:51 PM  
Dressing Type Transparent 6/12/2020  2:51 PM  
Hub Color/Line Status Pink;Patent 6/12/2020  2:51 PM  
Action Taken Blood drawn 6/12/2020  2:51 PM  
  
 
Opportunity for questions and clarification was provided. Patient transported with: 
 Monitor Tech

## 2020-06-12 NOTE — ED PROVIDER NOTES
Chief Complaint   Patient presents with    Generalized Body Aches     This is a 70-year-old female with past medical history as documented below presenting with generalized body aches and pains, nausea, and persistent chest pain and shortness of breath. She was seen about a week ago and completed a course of azithromycin for a possible right lower lobe pneumonia on chest x-ray. Denies any fevers specifically but noted that her pulse oximetry at home was 88-93% on room air after she had walked back to her bed from the bathroom. No hx asthma or COPD, no hx structural heart disease. No urinary complaints. Was hospitalized in February for pneumonia and had a reassuring echocardiogram at the time. Symptoms are moderate in nature without alleviating or exacerbating factors.       Past Medical History:   Diagnosis Date    Anemia     Anxiety     Breast cancer ([facility] [address] 2005    right lumpectomy    Chronic pain     neck pain    Cough     Depression     Hemorrhoid     Menopause 2005    Muscle pain     Neck problem     Pneumonia     sepis    S/P radiation therapy 2005    Sleep trouble     Thyroid disease     Urine troubles     unable to controll       Past Surgical History:   Procedure Laterality Date    HX BREAST LUMPECTOMY Right 2005    HX  SECTION      HX THYROIDECTOMY      IMPLANT BREAST SILICONE/EQ Bilateral 4449         Family History:   Problem Relation Age of Onset    Cancer Mother        Social History     Socioeconomic History    Marital status:      Spouse name: Not on file    Number of children: Not on file    Years of education: Not on file    Highest education level: Not on file   Occupational History    Not on file   Social Needs    Financial resource strain: Not on file    Food insecurity     Worry: Not on file     Inability: Not on file    Transportation needs     Medical: Not on file     Non-medical: Not on file   Tobacco Use    Smoking status: Light Tobacco Smoker Last attempt to quit: 1/1/2010     Years since quitting: 10.4    Smokeless tobacco: Current User   Substance and Sexual Activity    Alcohol use: Yes     Alcohol/week: 14.0 standard drinks     Types: 14 Shots of liquor per week     Frequency: 4 or more times a week     Drinks per session: 10 or more    Drug use: No    Sexual activity: Yes     Partners: Male     Birth control/protection: None   Lifestyle    Physical activity     Days per week: Not on file     Minutes per session: Not on file    Stress: Not on file   Relationships    Social connections     Talks on phone: Not on file     Gets together: Not on file     Attends Restoration service: Not on file     Active member of club or organization: Not on file     Attends meetings of clubs or organizations: Not on file     Relationship status: Not on file    Intimate partner violence     Fear of current or ex partner: Not on file     Emotionally abused: Not on file     Physically abused: Not on file     Forced sexual activity: Not on file   Other Topics Concern    Not on file   Social History Narrative    Not on file         ALLERGIES: Patient has no known allergies. Review of Systems   Constitutional: Positive for chills and fatigue. HENT: Negative for congestion. Eyes: Negative for redness. Respiratory: Positive for shortness of breath. Cardiovascular: Positive for chest pain. Gastrointestinal: Positive for nausea. Negative for abdominal pain. Musculoskeletal: Positive for back pain and myalgias. Skin: Negative for rash. Neurological: Positive for headaches. Psychiatric/Behavioral: Negative for confusion.        Vitals:    06/12/20 1420   BP: 122/53   Pulse: 86   Resp: 17   Temp: 98.6 °F (37 °C)   SpO2: 93%   Weight: 56.3 kg (124 lb 1.9 oz)   Height: 5' 2\" (1.575 m)            Physical Exam  General:  Awake and alert, NAD  HEENT:  NC/AT, equal pupils, moist mucous membranes  Neck:   Normal inspection, full range of motion  Cardiac: RRR, no murmurs  Respiratory:  Clear bilaterally, no wheezes, rales, rhonchi  Abdomen:  Soft and nontender, nondistended  Extremities: Warm and well perfused, no peripheral edema  Neuro:  Moving all extremities symmetrically without gross motor deficit  Skin:   No rashes or pallor    RESULTS  Recent Results (from the past 12 hour(s))   EKG, 12 LEAD, INITIAL    Collection Time: 06/12/20  2:23 PM   Result Value Ref Range    Ventricular Rate 84 BPM    Atrial Rate 84 BPM    P-R Interval 146 ms    QRS Duration 86 ms    Q-T Interval 386 ms    QTC Calculation (Bezet) 456 ms    Calculated P Axis 40 degrees    Calculated R Axis 16 degrees    Calculated T Axis 60 degrees    Diagnosis       Normal sinus rhythm  Possible Anterior infarct , age undetermined  Abnormal ECG  When compared with ECG of 22-FEB-2020 18:50,  No significant change was found     SAMPLES BEING HELD    Collection Time: 06/12/20  2:24 PM   Result Value Ref Range    SAMPLES BEING HELD 1RED, 1DK GRN     COMMENT        Add-on orders for these samples will be processed based on acceptable specimen integrity and analyte stability, which may vary by analyte. CBC WITH AUTOMATED DIFF    Collection Time: 06/12/20  2:24 PM   Result Value Ref Range    WBC 8.9 3.6 - 11.0 K/uL    RBC 3.01 (L) 3.80 - 5.20 M/uL    HGB 10.0 (L) 11.5 - 16.0 g/dL    HCT 31.1 (L) 35.0 - 47.0 %    .3 (H) 80.0 - 99.0 FL    MCH 33.2 26.0 - 34.0 PG    MCHC 32.2 30.0 - 36.5 g/dL    RDW 14.0 11.5 - 14.5 %    PLATELET 461 813 - 815 K/uL    MPV 12.2 8.9 - 12.9 FL    NRBC 0.0 0  WBC    ABSOLUTE NRBC 0.00 0.00 - 0.01 K/uL    NEUTROPHILS PENDING %    LYMPHOCYTES PENDING %    MONOCYTES PENDING %    EOSINOPHILS PENDING %    BASOPHILS PENDING %    IMMATURE GRANULOCYTES PENDING %    ABS. NEUTROPHILS PENDING K/UL    ABS. LYMPHOCYTES PENDING K/UL    ABS. MONOCYTES PENDING K/UL    ABS. EOSINOPHILS PENDING K/UL    ABS. BASOPHILS PENDING K/UL    ABS. IMM. GRANS.  PENDING K/UL    DF PENDING PROTHROMBIN TIME + INR    Collection Time: 06/12/20  2:24 PM   Result Value Ref Range    INR 1.1 0.9 - 1.1      Prothrombin time 10.9 9.0 - 11.1 sec        IMAGING  Xr Chest Port    Result Date: 6/12/2020  IMPRESSION: No acute findings. Procedures - none unless documented below  EKG as interpreted by me:  Normal sinus rhythm at a rate of 84, normal axis and intervals, no ST or T-wave changes suggesting acute ischemia. ED course: Presents with generalized myalgias, chest pain and shortness of breath, recently completed azithromycin for PNA. Chest CTA negative for PE, small bilateral effusions. Elevated BNP [id number]. Liver enzymes also elevated which is new for her. Hypoxic to 88-93% at home and requiring 2 liters NC here. Needs evaluation by pulmonary and cardiology, with elevated BNP concern for possible viral related cardiomyopathy. Recent negative COVID test although presentation is still suspicious, discussed with the hospitalist Emmett López who advised sending another COVID panel. Will admit for continued management, hospitalist made aware. Hospitalist Kristopher Serve for Admission  5:39 PM    ED Room Number:   SER01/01  Patient Name and age:  Josy Brown 81 y.o.  female  Working Diagnosis:     1. Pleural effusion, bilateral    2. Transaminitis    3. Hypoxia    4. Viral respiratory illness      COVID suspicion:   Other(swabbed on 6/620 with negative test, but concern for possible false negative)  Code Status:    Full Code  Readmission:    no  Isolation Requirements:  yes  Recommended Level of Care: telemetry  Department:    [facility] - [phone]  Other:     Presents with generalized myalgias, chest pain and shortness of breath, recently completed azithromycin for PNA. Chest CTA negative for PE, small bilateral effusions. Elevated BNP [id number]. Liver enzymes also elevated which is new for her. Hypoxic to 88-93% at home and requiring 2 liters NC here.   Needs evaluation by pulmonary and cardiology, cannot exclude viral cardiomyopathy.   Let me know if you feel like she needs to be swabbed a second time for COVID, as I do have some index of suspicion despite a recent negative test.

## 2020-06-13 ENCOUNTER — APPOINTMENT (OUTPATIENT)
Dept: ULTRASOUND IMAGING | Age: 70
DRG: 867 | End: 2020-06-13
Attending: INTERNAL MEDICINE
Payer: MEDICARE

## 2020-06-13 LAB
ALBUMIN SERPL-MCNC: 2.3 G/DL (ref 3.5–5)
ALBUMIN/GLOB SERPL: 0.6 {RATIO} (ref 1.1–2.2)
ALP SERPL-CCNC: 149 U/L (ref 45–117)
ALT SERPL-CCNC: 270 U/L (ref 12–78)
ANION GAP SERPL CALC-SCNC: 6 MMOL/L (ref 5–15)
APTT PPP: 28.4 SEC (ref 22.1–32)
AST SERPL-CCNC: 139 U/L (ref 15–37)
BASOPHILS # BLD: 0 K/UL (ref 0–0.1)
BASOPHILS NFR BLD: 0 % (ref 0–1)
BILIRUB SERPL-MCNC: 0.5 MG/DL (ref 0.2–1)
BUN SERPL-MCNC: 15 MG/DL (ref 6–20)
BUN/CREAT SERPL: 19 (ref 12–20)
CALCIUM SERPL-MCNC: 7.6 MG/DL (ref 8.5–10.1)
CHLORIDE SERPL-SCNC: 99 MMOL/L (ref 97–108)
CO2 SERPL-SCNC: 25 MMOL/L (ref 21–32)
CREAT SERPL-MCNC: 0.79 MG/DL (ref 0.55–1.02)
D DIMER PPP FEU-MCNC: 3.68 MG/L FEU (ref 0–0.65)
DIFFERENTIAL METHOD BLD: ABNORMAL
EOSINOPHIL # BLD: 0 K/UL (ref 0–0.4)
EOSINOPHIL NFR BLD: 0 % (ref 0–7)
ERYTHROCYTE [DISTWIDTH] IN BLOOD BY AUTOMATED COUNT: 14.2 % (ref 11.5–14.5)
FERRITIN SERPL-MCNC: 437 NG/ML (ref 26–388)
FIBRINOGEN PPP-MCNC: 306 MG/DL (ref 200–475)
GLOBULIN SER CALC-MCNC: 3.9 G/DL (ref 2–4)
GLUCOSE SERPL-MCNC: 139 MG/DL (ref 65–100)
HAV IGM SER QL: NONREACTIVE
HBV CORE IGM SER QL: NONREACTIVE
HBV SURFACE AG SER QL: 0.1 INDEX
HBV SURFACE AG SER QL: NEGATIVE
HCT VFR BLD AUTO: 31 % (ref 35–47)
HCV AB SERPL QL IA: NONREACTIVE
HCV COMMENT,HCGAC: NORMAL
HGB BLD-MCNC: 9.8 G/DL (ref 11.5–16)
IMM GRANULOCYTES # BLD AUTO: 0 K/UL
IMM GRANULOCYTES NFR BLD AUTO: 0 %
INR PPP: 1.1 (ref 0.9–1.1)
LDH SERPL L TO P-CCNC: 294 U/L (ref 81–246)
LYMPHOCYTES # BLD: 3.7 K/UL (ref 0.8–3.5)
LYMPHOCYTES NFR BLD: 31 % (ref 12–49)
MCH RBC QN AUTO: 33.3 PG (ref 26–34)
MCHC RBC AUTO-ENTMCNC: 31.6 G/DL (ref 30–36.5)
MCV RBC AUTO: 105.4 FL (ref 80–99)
MONOCYTES # BLD: 0.6 K/UL (ref 0–1)
MONOCYTES NFR BLD: 5 % (ref 5–13)
NEUTS BAND NFR BLD MANUAL: 5 % (ref 0–6)
NEUTS SEG # BLD: 7.7 K/UL (ref 1.8–8)
NEUTS SEG NFR BLD: 59 % (ref 32–75)
NRBC # BLD: 0 K/UL (ref 0–0.01)
NRBC BLD-RTO: 0 PER 100 WBC
PLATELET # BLD AUTO: 163 K/UL (ref 150–400)
PMV BLD AUTO: 12.1 FL (ref 8.9–12.9)
POTASSIUM SERPL-SCNC: 3.7 MMOL/L (ref 3.5–5.1)
PROCALCITONIN SERPL-MCNC: 0.37 NG/ML
PROT SERPL-MCNC: 6.2 G/DL (ref 6.4–8.2)
PROTHROMBIN TIME: 11.5 SEC (ref 9–11.1)
RBC # BLD AUTO: 2.94 M/UL (ref 3.8–5.2)
RBC MORPH BLD: ABNORMAL
SARS-COV-2, COV2: NOT DETECTED
SODIUM SERPL-SCNC: 130 MMOL/L (ref 136–145)
SOURCE, COVRS: NORMAL
SP1: NORMAL
SP2: NORMAL
SP3: NORMAL
SPECIMEN SOURCE, FCOV2M: NORMAL
THERAPEUTIC RANGE,PTTT: NORMAL SECS (ref 58–77)
WBC # BLD AUTO: 12 K/UL (ref 3.6–11)

## 2020-06-13 PROCEDURE — 74011250637 HC RX REV CODE- 250/637: Performed by: HOSPITALIST

## 2020-06-13 PROCEDURE — 74011000258 HC RX REV CODE- 258: Performed by: INTERNAL MEDICINE

## 2020-06-13 PROCEDURE — 74011250636 HC RX REV CODE- 250/636: Performed by: INTERNAL MEDICINE

## 2020-06-13 PROCEDURE — 85025 COMPLETE CBC W/AUTO DIFF WBC: CPT

## 2020-06-13 PROCEDURE — 80074 ACUTE HEPATITIS PANEL: CPT

## 2020-06-13 PROCEDURE — 84145 PROCALCITONIN (PCT): CPT

## 2020-06-13 PROCEDURE — 74011250636 HC RX REV CODE- 250/636: Performed by: HOSPITALIST

## 2020-06-13 PROCEDURE — 65270000029 HC RM PRIVATE

## 2020-06-13 PROCEDURE — 82728 ASSAY OF FERRITIN: CPT

## 2020-06-13 PROCEDURE — 76700 US EXAM ABDOM COMPLETE: CPT

## 2020-06-13 PROCEDURE — 80053 COMPREHEN METABOLIC PANEL: CPT

## 2020-06-13 PROCEDURE — 83615 LACTATE (LD) (LDH) ENZYME: CPT

## 2020-06-13 PROCEDURE — 85610 PROTHROMBIN TIME: CPT

## 2020-06-13 PROCEDURE — 36415 COLL VENOUS BLD VENIPUNCTURE: CPT

## 2020-06-13 PROCEDURE — 85384 FIBRINOGEN ACTIVITY: CPT

## 2020-06-13 PROCEDURE — 85379 FIBRIN DEGRADATION QUANT: CPT

## 2020-06-13 PROCEDURE — 85730 THROMBOPLASTIN TIME PARTIAL: CPT

## 2020-06-13 RX ORDER — QUETIAPINE FUMARATE 25 MG/1
25-50 TABLET, FILM COATED ORAL
COMMUNITY

## 2020-06-13 RX ADMIN — ENOXAPARIN SODIUM 30 MG: 30 INJECTION SUBCUTANEOUS at 11:39

## 2020-06-13 RX ADMIN — Medication 10 ML: at 06:33

## 2020-06-13 RX ADMIN — ENOXAPARIN SODIUM 30 MG: 30 INJECTION SUBCUTANEOUS at 22:48

## 2020-06-13 RX ADMIN — MORPHINE SULFATE 8 MG: 10 INJECTION INTRAVENOUS at 08:37

## 2020-06-13 RX ADMIN — AMPICILLIN SODIUM AND SULBACTAM SODIUM 3 G: 2; 1 INJECTION, POWDER, FOR SOLUTION INTRAMUSCULAR; INTRAVENOUS at 14:16

## 2020-06-13 RX ADMIN — ENOXAPARIN SODIUM 30 MG: 30 INJECTION SUBCUTANEOUS at 00:07

## 2020-06-13 RX ADMIN — FLUOXETINE 40 MG: 20 CAPSULE ORAL at 08:36

## 2020-06-13 RX ADMIN — ACETAMINOPHEN 650 MG: 325 TABLET, FILM COATED ORAL at 00:14

## 2020-06-13 RX ADMIN — ZINC SULFATE 220 MG (50 MG) CAPSULE 1 CAPSULE: CAPSULE at 08:36

## 2020-06-13 RX ADMIN — AMPICILLIN SODIUM AND SULBACTAM SODIUM 3 G: 2; 1 INJECTION, POWDER, FOR SOLUTION INTRAMUSCULAR; INTRAVENOUS at 21:07

## 2020-06-13 RX ADMIN — Medication 10 ML: at 21:06

## 2020-06-13 RX ADMIN — Medication 1000 MG: at 08:37

## 2020-06-13 RX ADMIN — DOXYCYCLINE 100 MG: 100 INJECTION, POWDER, LYOPHILIZED, FOR SOLUTION INTRAVENOUS at 22:49

## 2020-06-13 RX ADMIN — Medication 10 ML: at 14:17

## 2020-06-13 RX ADMIN — MORPHINE SULFATE 8 MG: 10 INJECTION INTRAVENOUS at 03:19

## 2020-06-13 RX ADMIN — LORAZEPAM 1 MG: 2 INJECTION INTRAMUSCULAR; INTRAVENOUS at 00:13

## 2020-06-13 NOTE — PROGRESS NOTES
Problem: Falls - Risk of  Goal: *Absence of Falls  Description: Document Danney Mess Fall Risk and appropriate interventions in the flowsheet. Outcome: Progressing Towards Goal  Note: Patient has not fallen during shift. Call bell within reach. Tray table within reach. Bed in lowest position. Non-skid socks on patient's feet when ambulating. Appropriate lighting in room. Fall Risk Interventions:            Medication Interventions: Evaluate medications/consider consulting pharmacy, Patient to call before getting OOB, Teach patient to arise slowly                   Problem: Pain  Goal: *Control of Pain  Outcome: Progressing Towards Goal     COVID negative. Ultrasound to be performed.   Problem: General Infection Care Plan (Adult and Pediatric)  Goal: Improvement in signs and symptoms of infection  Outcome: Progressing Towards Goal

## 2020-06-13 NOTE — H&P
History & Physical    Primary Care Provider: Elina Flaherty MD  Source of Information: Patient     History of Presenting Illness:   Karoline Camara is a 81 y.o. female who presents with whole body ache and chills     This is 81  female with past medical history notable for pneumonia,  tobacco abuse, chronic neck pain, breast cancer status post lumpectomy and radiation in 2005, anemia, anxiety [facility] body [facility] and pains, nausea, and persistent chest pain, chills  and shortness of breath. She was seen in ER about a week ago, for dry cough, sob, headache and she had covid 19 test at that time was negative and she was given azithromycin and completed the course for a possible right lower lobe pneumonia on chest x-ray. No fever documented, pt but has intermittent chills at home then increasing whole body ache last several days, pt has mild dry cough,  noted that her pulse oximetry at home was 88-93% on room air after she had walked back to her bed from the bathroom. No diagnose of asthma or COPD, no hx structural heart disease. No urinary complaints. Was hospitalized in February for pneumonia and had a reassuring echocardiogram at the time. pain, cough, congestion, recent illness, palpitations, or dysuria. Pt lives home with . She is a retired icu nurse. Review of Systems:  General: HPI, low appetite  HEENT: +headache, no vision changes, no nose discharge, no hearing changes   RES: HPI   CVS: no cp, no palpitation.   Muscular: no joint swelling, +neck pain, +back pain, , no leg swelling  Skin: no rash, no itching   GI: no vomiting, no diarrhea  : no dysuria, no hematuria  Hemo: no gum bleeding, no petechial   Neuro: no sensation changes, no focal weakness   Endo: no polydipsia   Psych: denied depression     Past Medical History:   Diagnosis Date    Anemia     Anxiety     Breast cancer ([facility] [address] 2005    right lumpectomy    Chronic pain     neck pain    Cough     Depression     Hemorrhoid     Menopause 2005    Muscle pain     Neck problem     Pneumonia     sepis    S/P radiation therapy 2005    Sleep trouble     Thyroid disease     Urine troubles     unable to controll      Past Surgical History:   Procedure Laterality Date    HX BREAST LUMPECTOMY Right 2005    HX  SECTION      HX THYROIDECTOMY      IMPLANT BREAST SILICONE/EQ Bilateral 4440     Prior to Admission medications    Medication Sig Start Date End Date Taking?  Authorizing Provider   clonazePAM (KLONOPIN) 0.5 mg tablet TAKE 1-2 TABLETS EACH DAY AS NEEDED FOR ANXIETY 19  Yes Provider, Historical   FLUoxetine (PROzac) 40 mg capsule TAKE 1 CAPSULE BY MOUTH EVERY DAY 3/16/20   Other, MD Lulu   albuterol (PROVENTIL HFA, VENTOLIN HFA, PROAIR HFA) 90 mcg/actuation inhaler INHALE 1 2 PUFFS BY MOUTH EVERY 6 HOURS AS NEEDED FOR WHEEZING 20   Gio, MD Lulu   azithromycin (ZITHROMAX) 250 mg tablet Take 2 tabs on day 1, take 1 tab daily days 2 through 5 20  Dolores Perez MD   albuterol sulfate 90 mcg/actuation aepb Take 1-2 Puffs by inhalation every six (6) hours as needed for Wheezing. 20   MERISSA Simons MD   levothyroxine (SYNTHROID) 100 mcg tablet TAKE 1 TABLET EVERY DAY BEFORE BREAKFAST 20   Caryle Hind, MD   permethrin (ACTICIN) 5 % topical cream permethrin 5 % topical cream   APPLY (THOROUGHLY MASSAGE INTO SKIN FROM HEAD TO SOLES OF FEET) BY TOPICAL ROUTE ONCE LEAVE ON FOR 8-14 HR, THEN REMOVE BY [address] [place] [address]    Provider, Historical   dextroamphetamine (DEXTROSTAT) 10 mg tab TAKE 1 TABLET BY MOUTH TWICE A DAY 19   Provider, Historical   VIIBRYD 40 mg tab tablet TAKE 1 TABLET BY MOUTH EVERY DAY 19   Provider, Historical   traZODone (DESYREL) 150 mg tablet TAKE 1 TABLET EVERY NIGHT 19   Caryle Hind, MD   LORazepam (ATIVAN) 1 mg tablet TAKE 1 TABLET BY MOUTH TWICE A DAY AS NEEDED 19 Freedom Ge MD   ARIPiprazole (ABILIFY) 5 mg tablet  3/12/19   Provider, Historical   sertraline (ZOLOFT) 100 mg tablet TAKE  1 1/2 TABLET BY MOUTH EVERY DAY 3/1/19   Provider, Historical   promethazine (PHENERGAN) 25 mg tablet Take 1/2-1 tablet every 6 hours as needed for nausea. 3/13/19   Freedom Ge MD   ondansetron (ZOFRAN ODT) 4 mg disintegrating tablet Take 1 Tab by mouth every eight (8) hours as needed for Nausea. 3/13/19   Freedom Ge MD   colestipol (COLESTID) 1 gram tablet Take 1 Tab by mouth two (2) times a day. 3/13/19   Freedom Ge MD   Bacillus coagulans (PROBIOTIC, B. COAGULANS, PO) Take  by mouth. Provider, Historical   naproxen sodium (ALEVE) 220 mg cap Take  by mouth. Provider, Historical     No Known Allergies   Family History   Problem Relation Age of Onset    Cancer Mother         SOCIAL HISTORY:  Patient resides:  Independently x   Assisted Living    SNF    With family care       Smoking history:   None    Former x   Chronic      Alcohol history:   None x   Social    Chronic      Ambulates:   Independently x   w/cane    w/walker    w/wc    CODE STATUS:  DNR    Full x   Other      Objective:     Physical Exam:     Visit Vitals  /48   Pulse (!) 102   Temp 98.8 °F (37.1 °C)   Resp 20   Ht 5' 2\" (1.575 m)   Wt 58.1 kg (128 lb 1.4 oz)   SpO2 95%   Breastfeeding No   BMI 23.43 kg/m²      O2 Device: Room air    General:  Alert, cooperative, no distress, appears stated age. Very anxious, pt is shaking    Head:  Normocephalic, without obvious abnormality, atraumatic. Eyes:  Conjunctivae/corneas clear. PERRL, EOMs intact. Nose: Nares normal. Septum midline. Mucosa normal. No drainage or sinus tenderness. Throat: Lips, mucosa, and tongue normal. Teeth and gums normal.   Neck: Supple, symmetrical, trachea midline, no adenopathy, thyroid: no enlargement/tenderness/nodules, no carotid bruit and no JVD. Lungs:    No wheeing, decrease bs bilat lower lung    Chest wall:  No tenderness or deformity. Heart:  Regular rate and rhythm, S1, S2 normal, no murmur, click, rub or gallop. Abdomen:   Soft, non-tender. Bowel sounds normal. No masses,  No organomegaly. Extremities: Extremities normal, atraumatic, no cyanosis or edema. Pulses: 2+ and symmetric all extremities. Skin: Skin color, texture, turgor normal. No rashes or lesions   Neurologic: CNII-XII intact. No focal weakness          Data Review:     Recent Days:  Recent Labs     06/12/20  1424   WBC 8.9   HGB 10.0*   HCT 31.1*        Recent Labs     06/12/20  1424      K 3.3*      CO2 31   *   BUN 19   CREA 1.01   CA 8.4*   MG 2.0   ALB 2.4*   *   INR 1.1     No results for input(s): PH, PCO2, PO2, HCO3, FIO2 in the last 72 hours. 24 Hour Results:  Recent Results (from the past 24 hour(s))   EKG, 12 LEAD, INITIAL    Collection Time: 06/12/20  2:23 PM   Result Value Ref Range    Ventricular Rate 84 BPM    Atrial Rate 84 BPM    P-R Interval 146 ms    QRS Duration 86 ms    Q-T Interval 386 ms    QTC Calculation (Bezet) 456 ms    Calculated P Axis 40 degrees    Calculated R Axis 16 degrees    Calculated T Axis 60 degrees    Diagnosis       Normal sinus rhythm  Nonspecific ST abnormality    When compared with ECG of 22-FEB-2020 18:50,  No significant change was found  Confirmed by Aurora Francisco M.D., Chloe Calderón ([id number]) on 6/12/2020 6:40:54 PM     SAMPLES BEING HELD    Collection Time: 06/12/20  2:24 PM   Result Value Ref Range    SAMPLES BEING HELD 1RED, 1DK GRN     COMMENT        Add-on orders for these samples will be processed based on acceptable specimen integrity and analyte stability, which may vary by analyte.    CBC WITH AUTOMATED DIFF    Collection Time: 06/12/20  2:24 PM   Result Value Ref Range    WBC 8.9 3.6 - 11.0 K/uL    RBC 3.01 (L) 3.80 - 5.20 M/uL    HGB 10.0 (L) 11.5 - 16.0 g/dL    HCT 31.1 (L) 35.0 - 47.0 %    .3 (H) 80.0 - 99.0 FL    MCH 33.2 26.0 - 34.0 PG    MCHC 32.2 30.0 - 36.5 g/dL    RDW 14.0 11.5 - 14.5 %    PLATELET 501 454 - 492 K/uL    MPV 12.2 8.9 - 12.9 FL    NRBC 0.0 0  WBC    ABSOLUTE NRBC 0.00 0.00 - 0.01 K/uL    NEUTROPHILS 69 32 - 75 %    LYMPHOCYTES 28 12 - 49 %    MONOCYTES 3 (L) 5 - 13 %    EOSINOPHILS 0 0 - 7 %    BASOPHILS 0 0 - 1 %    IMMATURE GRANULOCYTES 0 %    ABS. NEUTROPHILS 6.1 1.8 - 8.0 K/UL    ABS. LYMPHOCYTES 2.5 0.8 - 3.5 K/UL    ABS. MONOCYTES 0.3 0.0 - 1.0 K/UL    ABS. EOSINOPHILS 0.0 0.0 - 0.4 K/UL    ABS. BASOPHILS 0.0 0.0 - 0.1 K/UL    ABS. IMM. GRANS. 0.0 K/UL    DF MANUAL      RBC COMMENTS MACROCYTOSIS  1+       METABOLIC PANEL, COMPREHENSIVE    Collection Time: 06/12/20  2:24 PM   Result Value Ref Range    Sodium 138 136 - 145 mmol/L    Potassium 3.3 (L) 3.5 - 5.1 mmol/L    Chloride 101 97 - 108 mmol/L    CO2 31 21 - 32 mmol/L    Anion gap 6 5 - 15 mmol/L    Glucose 146 (H) 65 - 100 mg/dL    BUN 19 6 - 20 MG/DL    Creatinine 1.01 0.55 - 1.02 MG/DL    BUN/Creatinine ratio 19 12 - 20      GFR est AA >60 >60 ml/min/1.73m2    GFR est non-AA 55 (L) >60 ml/min/1.73m2    Calcium 8.4 (L) 8.5 - 10.1 MG/DL    Bilirubin, total 0.4 0.2 - 1.0 MG/DL    ALT (SGPT) 294 (H) 12 - 78 U/L    AST (SGOT) 169 (H) 15 - 37 U/L    Alk.  phosphatase 136 (H) 45 - 117 U/L    Protein, total 6.1 (L) 6.4 - 8.2 g/dL    Albumin 2.4 (L) 3.5 - 5.0 g/dL    Globulin 3.7 2.0 - 4.0 g/dL    A-G Ratio 0.6 (L) 1.1 - 2.2     MAGNESIUM    Collection Time: 06/12/20  2:24 PM   Result Value Ref Range    Magnesium 2.0 1.6 - 2.4 mg/dL   TROPONIN I    Collection Time: 06/12/20  2:24 PM   Result Value Ref Range    Troponin-I, Qt. <0.05 <0.05 ng/mL   PROTHROMBIN TIME + INR    Collection Time: 06/12/20  2:24 PM   Result Value Ref Range    INR 1.1 0.9 - 1.1      Prothrombin time 10.9 9.0 - 11.1 sec   NT-PRO BNP    Collection Time: 06/12/20  2:24 PM   Result Value Ref Range    NT pro-BNP 1,644 (H) 0 - 125 PG/ML   MONONUCLEOSIS SCREEN    Collection Time: 06/12/20  2:24 PM   Result Value Ref Range Mononucleosis screen Negative NEG     LACTIC ACID    Collection Time: 06/12/20  2:41 PM   Result Value Ref Range    Lactic acid 1.5 0.4 - 2.0 MMOL/L   URINALYSIS W/MICROSCOPIC    Collection Time: 06/12/20  4:57 PM   Result Value Ref Range    Color YELLOW/STRAW      Appearance CLEAR CLEAR      Specific gravity >1.030 (H) 1.003 - 1.030    pH (UA) 5.5 5.0 - 8.0      Protein Negative NEG mg/dL    Glucose Negative NEG mg/dL    Ketone Negative NEG mg/dL    Bilirubin Negative NEG      Blood MODERATE (A) NEG      Urobilinogen 1.0 0.2 - 1.0 EU/dL    Nitrites Negative NEG      Leukocyte Esterase Negative NEG      WBC 0-4 0 - 4 /hpf    RBC 5-10 0 - 5 /hpf    Epithelial cells FEW FEW /lpf    Bacteria Negative NEG /hpf   URINE CULTURE HOLD SAMPLE    Collection Time: 06/12/20  4:57 PM   Result Value Ref Range    Urine culture hold        Urine on hold in Microbiology dept for 2 days. If unpreserved urine is submitted, it cannot be used for addtional testing after 24 hours, recollection will be required. SARS-COV-2    Collection Time: 06/12/20  6:34 PM   Result Value Ref Range    Specimen source Nasopharyngeal      SARS-CoV-2 PENDING     SARS-CoV-2 PENDING     Specimen source Nasopharyngeal      COVID-19 rapid test PENDING     COVID-19 PENDING NEG         Imaging:   Cta Chest W Or W Wo Cont    Result Date: 6/12/2020  IMPRESSION: 1. No evidence for pulmonary embolism. 2. Small bilateral pleural effusions. Xr Chest Port    Result Date: 6/12/2020  IMPRESSION: No acute findings. Assessment:     Active Problems:    SOB (shortness of breath) (6/12/2020)           Plan:     1. Acute viral illness: with chills, body ache, dry cough, will repeat COIVD 19 test, also send for other respiratory viral panel. Supportive care for now   2. Acute hypoxia respiratory failure: CTA of chest reviewed. No singificant infiltration. She may have COPD due to her smoking history. O2 for now. 3. Anxiety: prn ativan .         Signed By: Dunia Mazariegos Claude Lobo, MD     June 12, 2020

## 2020-06-13 NOTE — PROGRESS NOTES
Day #1 of Unasyn  Indication:  intra-abdominal infection  Current regimen:  3 gram Q8H  Abx regimen: Unasyn  Recent Labs     20  0030 20  0028 20  1424   WBC 12.0*  --  8.9   CREA  --  0.79 1.01   BUN  --  15 19     Est CrCl: 55 ml/min  Temp (24hrs), Av.1 °F (37.3 °C), Min:98.3 °F (36.8 °C), Max:102 °F (38.9 °C)    Cultures:    blood, pending    Plan: Change to Unasyn 3 gram Q6H for crcl > 30 ml/min

## 2020-06-13 NOTE — CONSULTS
ID Consult Note  NAME:  Spencer Aviles   :   1950   MRN:   [id number]   Date/Time:  2020  Subjective:   REASON FOR CONSULT:    Fever  Estevan Borrero is a 81 y.o. with a history of Breast cancer, chronic neck pain, thyroid disorder and pneumonia. She is not a good historian. The patient says she has been having neck pain for the last 15 years. Over the past 2 weeks, she has had generalized body aches. This has been associated with fever and nausea. She couldn't really tell me much more about her history, but she did say that she had been bitten by ticks a few weeks ago. She doesn't remember having any rashes. She does have anorexia. Last week, she had a cough, but no shortness of breath. She went to an emergency room where she was tested for Covid. It came back negative. Her myalgias and fevers continued. There were no aggravating or alleviating factors. For this reason, she came to the emergency room. She was found to have A WBC of 8.9 on admission. Her liver function tests were elevated. She does not complain of any right upper quadrant pain. She is currently on Unasyn and we are being asked to see her in consult. She was again tested for Covid and it came back negative once more.       Past Medical History:   Diagnosis Date    Anemia     Anxiety     Breast cancer ([address] 2005    right lumpectomy    Chronic pain     neck pain    Cough     Depression     Hemorrhoid     Menopause 2005    Muscle pain     Neck problem     Pneumonia     sepis    S/P radiation therapy 2005    Sleep trouble     Thyroid disease     Urine troubles     unable to controll      Past Surgical History:   Procedure Laterality Date    HX BREAST LUMPECTOMY Right 2005    HX  SECTION      HX THYROIDECTOMY      IMPLANT BREAST SILICONE/EQ Bilateral 5092     Social History     Tobacco Use    Smoking status: Light Tobacco Smoker     Last attempt to quit: 2010     Years since quitting: 10.4    Smokeless tobacco: Current User   Substance Use Topics    Alcohol use: Yes     Alcohol/week: 14.0 standard drinks     Types: 14 Shots of liquor per week     Frequency: 4 or more times a week     Drinks per session: 10 or more    No drug use    Family History   Problem Relation Age of Onset    Cancer Mother       No Known Allergies   Home Medications:  Prior to Admission Medications   Prescriptions Last Dose Informant Patient Reported? Taking? ARIPiprazole (ABILIFY) 5 mg tablet Not Taking at Unknown time  Yes No   Bacillus coagulans (PROBIOTIC, B. COAGULANS, PO)   Yes No   Sig: Take  by mouth. FLUoxetine (PROzac) 40 mg capsule   Yes No   Sig: TAKE 1 CAPSULE BY MOUTH EVERY DAY   LORazepam (ATIVAN) 1 mg tablet Not Taking at Unknown time  No No   Sig: TAKE 1 TABLET BY MOUTH TWICE A DAY AS NEEDED   Patient taking differently: 0.5-1 mg. For help sleeping   QUEtiapine (SEROqueL) 25 mg tablet 6/11/2020 at 2200  Yes Yes   Sig: Take 25-50 mg by mouth nightly. For sleep   VIIBRYD 40 mg tab tablet Not Taking at Unknown time  Yes No   Sig: TAKE 1 TABLET BY MOUTH EVERY DAY   albuterol (PROVENTIL HFA, VENTOLIN HFA, PROAIR HFA) 90 mcg/actuation inhaler   Yes No   Sig: INHALE 1 2 PUFFS BY MOUTH EVERY 6 HOURS AS NEEDED FOR WHEEZING   albuterol sulfate 90 mcg/actuation aepb   No No   Sig: Take 1-2 Puffs by inhalation every six (6) hours as needed for Wheezing. azithromycin (ZITHROMAX) 250 mg tablet   No No   Sig: Take 2 tabs on day 1, take 1 tab daily days 2 through 5   clonazePAM (KLONOPIN) 0.5 mg tablet 6/5/2020 at Unknown time  Yes Yes   Sig: TAKE 1-2 TABLETS EACH DAY AS NEEDED FOR ANXIETY   colestipol (COLESTID) 1 gram tablet Not Taking at Unknown time  No No   Sig: Take 1 Tab by mouth two (2) times a day.    dextroamphetamine (DEXTROSTAT) 10 mg tab 6/6/2020 at Unknown time  Yes Yes   Sig: TAKE 1 TABLET BY MOUTH TWICE A DAY   levothyroxine (SYNTHROID) 100 mcg tablet 6/11/2020 at Unknown time  No Yes   Sig: TAKE 1 TABLET EVERY DAY BEFORE BREAKFAST   naproxen sodium (ALEVE) 220 mg cap   Yes No   Sig: Take  by mouth. ondansetron (ZOFRAN ODT) 4 mg disintegrating tablet   No No   Sig: Take 1 Tab by mouth every eight (8) hours as needed for Nausea. permethrin (ACTICIN) 5 % topical cream Not Taking at Unknown time  Yes No   Sig: permethrin 5 % topical cream   APPLY (THOROUGHLY MASSAGE INTO SKIN FROM HEAD TO SOLES OF FEET) BY TOPICAL ROUTE ONCE LEAVE ON FOR 8-14 HR, THEN REMOVE BY THOROUGH WASHING   promethazine (PHENERGAN) 25 mg tablet   No No   Sig: Take 1/2-1 tablet every 6 hours as needed for nausea.    sertraline (ZOLOFT) 100 mg tablet Not Taking at Unknown time  Yes No   Sig: TAKE  1 1/2 TABLET BY MOUTH EVERY DAY   traZODone (DESYREL) 150 mg tablet 6/11/2020 at Unknown time  No Yes   Sig: TAKE 1 TABLET EVERY NIGHT      Facility-Administered Medications: None     Hospital medications:  Current Facility-Administered Medications   Medication Dose Route Frequency    enoxaparin (LOVENOX) injection 30 mg  30 mg SubCUTAneous Q24H    ampicillin-sulbactam (UNASYN) 3 g in 0.9% sodium chloride (MBP/ADV) 100 mL  3 g IntraVENous Y9K    methyl salicylate-menthol (BENGAY) 15-10 % cream   Topical Q6H PRN    doxycycline (VIBRAMYCIN) 100 mg in 0.9% sodium chloride (MBP/ADV) 100 mL  100 mg IntraVENous Q12H    acetaminophen (TYLENOL) tablet 650 mg  650 mg Oral Q8H PRN    sodium chloride (NS) flush 5-40 mL  5-40 mL IntraVENous Q8H    sodium chloride (NS) flush 5-40 mL  5-40 mL IntraVENous PRN    naloxone (NARCAN) injection 0.4 mg  0.4 mg IntraVENous PRN    ondansetron (ZOFRAN) injection 4 mg  4 mg IntraVENous Q4H PRN    LORazepam (ATIVAN) injection 1 mg  1 mg IntraVENous Q8H PRN    FLUoxetine (PROzac) capsule 40 mg  40 mg Oral DAILY    albuterol (PROVENTIL HFA, VENTOLIN HFA, PROAIR HFA) inhaler 1 Puff  1 Puff Inhalation Q6H PRN     REVIEW OF SYSTEMS:        Const:   negative weight loss  Eyes:   negative diplopia or visual changes, negative eye pain  ENT:   negative coryza, negative sore throat  Resp:   negative  hemoptysis  Cards:  negative for chest pain, palpitations, lower extremity edema  :  negative for frequency, dysuria and hematuria  GI:   Negative for hematemesis and hematochezia  Skin:   negative for pruritus  Heme:   negative for easy bruising and gum/nose bleeding  MS:  Has muscle weakness  Neurolo:  negative for  vertigo,   Psych:  negative for hallucinations        Objective:   VITALS:    Visit Vitals  /58 (BP 1 Location: Right arm, BP Patient Position: At rest)   Pulse 96   Temp 98.2 °F (36.8 °C)   Resp 16   Ht 5' 2\" (1.575 m)   Wt 58 kg (127 lb 13.9 oz)   SpO2 96%   Breastfeeding No   BMI 23.39 kg/m²     Temp (24hrs), Av °F (37.2 °C), Min:98.2 °F (36.8 °C), Max:100.7 °F (38.2 °C)    PHYSICAL EXAM:   General:    Alert, cooperative, no distress, appears stated age. Head:   Normocephalic, without obvious abnormality, atraumatic. Eyes:   Conjunctivae clear, anicteric sclerae. Nose:  Nares normal.   Throat:    Lips and tongue normal.  No Thrush  Neck:  Supple, symmetrical    no carotid bruit and no JVD. :    No CVA tenderness, no gordon catheter  Lungs:   Clear to auscultation bilaterally. No Wheezing or Rhonchi. No rales. Heart:   Regular rate and rhythm,  no murmur, rub or gallop. Abdomen:   Soft, non-tender,not distended. Bowel sounds normal.   Extremities: Knees, ankles, wrists, elbows are not warm and not tender. No pedal edema  Skin:     No rashes or lesions.   Not Jaundiced  Lymph: Cervical normal  Neurologic: Full use of extraocular muscles, no facial asymmetry, tongue midline muscle strength 5 out of 5    LAB DATA REVIEWED:    Recent Results (from the past 48 hour(s))   EKG, 12 LEAD, INITIAL    Collection Time: 20  2:23 PM   Result Value Ref Range    Ventricular Rate 84 BPM    Atrial Rate 84 BPM    P-R Interval 146 ms    QRS Duration 86 ms    Q-T Interval 386 ms    QTC Calculation (Bezet) 456 ms Calculated P Axis 40 degrees    Calculated R Axis 16 degrees    Calculated T Axis 60 degrees    Diagnosis       Normal sinus rhythm  Nonspecific ST abnormality    When compared with ECG of 22-FEB-2020 18:50,  No significant change was found  Confirmed by Bernabe Gage M.D., Edmond Rolls ([id number]) on 6/12/2020 6:40:54 PM     SAMPLES BEING HELD    Collection Time: 06/12/20  2:24 PM   Result Value Ref Range    SAMPLES BEING HELD 1RED, 1DK GRN     COMMENT        Add-on orders for these samples will be processed based on acceptable specimen integrity and analyte stability, which may vary by analyte. CBC WITH AUTOMATED DIFF    Collection Time: 06/12/20  2:24 PM   Result Value Ref Range    WBC 8.9 3.6 - 11.0 K/uL    RBC 3.01 (L) 3.80 - 5.20 M/uL    HGB 10.0 (L) 11.5 - 16.0 g/dL    HCT 31.1 (L) 35.0 - 47.0 %    .3 (H) 80.0 - 99.0 FL    MCH 33.2 26.0 - 34.0 PG    MCHC 32.2 30.0 - 36.5 g/dL    RDW 14.0 11.5 - 14.5 %    PLATELET 450 520 - 111 K/uL    MPV 12.2 8.9 - 12.9 FL    NRBC 0.0 0  WBC    ABSOLUTE NRBC 0.00 0.00 - 0.01 K/uL    NEUTROPHILS 69 32 - 75 %    LYMPHOCYTES 28 12 - 49 %    MONOCYTES 3 (L) 5 - 13 %    EOSINOPHILS 0 0 - 7 %    BASOPHILS 0 0 - 1 %    IMMATURE GRANULOCYTES 0 %    ABS. NEUTROPHILS 6.1 1.8 - 8.0 K/UL    ABS. LYMPHOCYTES 2.5 0.8 - 3.5 K/UL    ABS. MONOCYTES 0.3 0.0 - 1.0 K/UL    ABS. EOSINOPHILS 0.0 0.0 - 0.4 K/UL    ABS. BASOPHILS 0.0 0.0 - 0.1 K/UL    ABS. IMM.  GRANS. 0.0 K/UL    DF MANUAL      RBC COMMENTS MACROCYTOSIS  1+       METABOLIC PANEL, COMPREHENSIVE    Collection Time: 06/12/20  2:24 PM   Result Value Ref Range    Sodium 138 136 - 145 mmol/L    Potassium 3.3 (L) 3.5 - 5.1 mmol/L    Chloride 101 97 - 108 mmol/L    CO2 31 21 - 32 mmol/L    Anion gap 6 5 - 15 mmol/L    Glucose 146 (H) 65 - 100 mg/dL    BUN 19 6 - 20 MG/DL    Creatinine 1.01 0.55 - 1.02 MG/DL    BUN/Creatinine ratio 19 12 - 20      GFR est AA >60 >60 ml/min/1.73m2    GFR est non-AA 55 (L) >60 ml/min/1.73m2    Calcium 8.4 (L) 8.5 - 10.1 MG/DL    Bilirubin, total 0.4 0.2 - 1.0 MG/DL    ALT (SGPT) 294 (H) 12 - 78 U/L    AST (SGOT) 169 (H) 15 - 37 U/L    Alk. phosphatase 136 (H) 45 - 117 U/L    Protein, total 6.1 (L) 6.4 - 8.2 g/dL    Albumin 2.4 (L) 3.5 - 5.0 g/dL    Globulin 3.7 2.0 - 4.0 g/dL    A-G Ratio 0.6 (L) 1.1 - 2.2     MAGNESIUM    Collection Time: 06/12/20  2:24 PM   Result Value Ref Range    Magnesium 2.0 1.6 - 2.4 mg/dL   TROPONIN I    Collection Time: 06/12/20  2:24 PM   Result Value Ref Range    Troponin-I, Qt. <0.05 <0.05 ng/mL   PROTHROMBIN TIME + INR    Collection Time: 06/12/20  2:24 PM   Result Value Ref Range    INR 1.1 0.9 - 1.1      Prothrombin time 10.9 9.0 - 11.1 sec   NT-PRO BNP    Collection Time: 06/12/20  2:24 PM   Result Value Ref Range    NT pro-BNP 1,644 (H) 0 - 125 PG/ML   MONONUCLEOSIS SCREEN    Collection Time: 06/12/20  2:24 PM   Result Value Ref Range    Mononucleosis screen Negative NEG     CULTURE, BLOOD, PAIRED    Collection Time: 06/12/20  2:41 PM   Result Value Ref Range    Special Requests: NO SPECIAL REQUESTS      Culture result: NO GROWTH 2 DAYS     LACTIC ACID    Collection Time: 06/12/20  2:41 PM   Result Value Ref Range    Lactic acid 1.5 0.4 - 2.0 MMOL/L   URINALYSIS W/MICROSCOPIC    Collection Time: 06/12/20  4:57 PM   Result Value Ref Range    Color YELLOW/STRAW      Appearance CLEAR CLEAR      Specific gravity >1.030 (H) 1.003 - 1.030    pH (UA) 5.5 5.0 - 8.0      Protein Negative NEG mg/dL    Glucose Negative NEG mg/dL    Ketone Negative NEG mg/dL    Bilirubin Negative NEG      Blood MODERATE (A) NEG      Urobilinogen 1.0 0.2 - 1.0 EU/dL    Nitrites Negative NEG      Leukocyte Esterase Negative NEG      WBC 0-4 0 - 4 /hpf    RBC 5-10 0 - 5 /hpf    Epithelial cells FEW FEW /lpf    Bacteria Negative NEG /hpf   URINE CULTURE HOLD SAMPLE    Collection Time: 06/12/20  4:57 PM   Result Value Ref Range    Urine culture hold        Urine on hold in Microbiology dept for 2 days.   If unpreserved urine is submitted, it cannot be used for addtional testing after 24 hours, recollection will be required.    SARS-COV-2    Collection Time: 06/12/20  6:34 PM   Result Value Ref Range    Specimen source Nasopharyngeal      SARS-CoV-2 Not detected NOTD      Specimen source Nasopharyngeal     RESPIRATORY PANEL,PCR,NASOPHARYNGEAL    Collection Time: 06/12/20  6:34 PM   Result Value Ref Range    Adenovirus Not detected NOTD      Coronavirus 229E Not detected NOTD      Coronavirus HKU1 Not detected NOTD      Coronavirus CVNL63 Not detected NOTD      Coronavirus OC43 Not detected NOTD      Metapneumovirus Not detected NOTD      Rhinovirus and Enterovirus Not detected NOTD      Influenza A Not detected NOTD      Influenza A, subtype H1 Not detected NOTD      Influenza A, subtype H3 Not detected NOTD      INFLUENZA A H1N1 PCR Not detected NOTD      Influenza B Not detected NOTD      Parainfluenza 1 Not detected NOTD      Parainfluenza 2 Not detected NOTD      Parainfluenza 3 Not detected NOTD      Parainfluenza virus 4 Not detected NOTD      RSV by PCR Not detected NOTD      B. parapertussis, PCR Not detected NOTD      Bordetella pertussis - PCR Not detected NOTD      Chlamydophila pneumoniae DNA, QL, PCR Not detected NOTD      Mycoplasma pneumoniae DNA, QL, PCR Not detected NOTD     PROTHROMBIN TIME + INR    Collection Time: 06/13/20 12:27 AM   Result Value Ref Range    INR 1.1 0.9 - 1.1      Prothrombin time 11.5 (H) 9.0 - 11.1 sec   PTT    Collection Time: 06/13/20 12:27 AM   Result Value Ref Range    aPTT 28.4 22.1 - 32.0 sec    aPTT, therapeutic range     58.0 - 77.0 SECS   FIBRINOGEN    Collection Time: 06/13/20 12:27 AM   Result Value Ref Range    Fibrinogen 306 200 - 475 mg/dL   D DIMER    Collection Time: 06/13/20 12:27 AM   Result Value Ref Range    D-dimer 3.68 (H) 0.00 - 0.65 mg/L FEU   METABOLIC PANEL, COMPREHENSIVE    Collection Time: 06/13/20 12:28 AM   Result Value Ref Range    Sodium 130 (L) 136 - 145 mmol/L Potassium 3.7 3.5 - 5.1 mmol/L    Chloride 99 97 - 108 mmol/L    CO2 25 21 - 32 mmol/L    Anion gap 6 5 - 15 mmol/L    Glucose 139 (H) 65 - 100 mg/dL    BUN 15 6 - 20 MG/DL    Creatinine 0.79 0.55 - 1.02 MG/DL    BUN/Creatinine ratio 19 12 - 20      GFR est AA >60 >60 ml/min/1.73m2    GFR est non-AA >60 >60 ml/min/1.73m2    Calcium 7.6 (L) 8.5 - 10.1 MG/DL    Bilirubin, total 0.5 0.2 - 1.0 MG/DL    ALT (SGPT) 270 (H) 12 - 78 U/L    AST (SGOT) 139 (H) 15 - 37 U/L    Alk. phosphatase 149 (H) 45 - 117 U/L    Protein, total 6.2 (L) 6.4 - 8.2 g/dL    Albumin 2.3 (L) 3.5 - 5.0 g/dL    Globulin 3.9 2.0 - 4.0 g/dL    A-G Ratio 0.6 (L) 1.1 - 2.2     LD    Collection Time: 06/13/20 12:28 AM   Result Value Ref Range     (H) 81 - 246 U/L   FERRITIN    Collection Time: 06/13/20 12:29 AM   Result Value Ref Range    Ferritin 437 (H) 26 - 388 NG/ML   HEPATITIS PANEL, ACUTE    Collection Time: 06/13/20 12:29 AM   Result Value Ref Range    Hepatitis A, IgM NONREACTIVE NR      __          Hepatitis B surface Ag 0.10 Index    Hep B surface Ag Interp. Negative NEG      __          Hepatitis B core, IgM NONREACTIVE NR      __          Hep C  virus Ab Interp. NONREACTIVE NR      Hep C  virus Ab comment Method used is Siemens Advia Centaur     CBC WITH AUTOMATED DIFF    Collection Time: 06/13/20 12:30 AM   Result Value Ref Range    WBC 12.0 (H) 3.6 - 11.0 K/uL    RBC 2.94 (L) 3.80 - 5.20 M/uL    HGB 9.8 (L) 11.5 - 16.0 g/dL    HCT 31.0 (L) 35.0 - 47.0 %    .4 (H) 80.0 - 99.0 FL    MCH 33.3 26.0 - 34.0 PG    MCHC 31.6 30.0 - 36.5 g/dL    RDW 14.2 11.5 - 14.5 %    PLATELET 835 475 - 219 K/uL    MPV 12.1 8.9 - 12.9 FL    NRBC 0.0 0  WBC    ABSOLUTE NRBC 0.00 0.00 - 0.01 K/uL    NEUTROPHILS 59 32 - 75 %    BAND NEUTROPHILS 5 0 - 6 %    LYMPHOCYTES 31 12 - 49 %    MONOCYTES 5 5 - 13 %    EOSINOPHILS 0 0 - 7 %    BASOPHILS 0 0 - 1 %    IMMATURE GRANULOCYTES 0 %    ABS. NEUTROPHILS 7.7 1.8 - 8.0 K/UL    ABS.  LYMPHOCYTES 3.7 (H) 0.8 - 3.5 K/UL    ABS. MONOCYTES 0.6 0.0 - 1.0 K/UL    ABS. EOSINOPHILS 0.0 0.0 - 0.4 K/UL    ABS. BASOPHILS 0.0 0.0 - 0.1 K/UL    ABS. IMM. GRANS. 0.0 K/UL    DF MANUAL      RBC COMMENTS MACROCYTOSIS  1+       PROCALCITONIN    Collection Time: 06/13/20 12:30 AM   Result Value Ref Range    Procalcitonin 7.77 ng/mL   METABOLIC PANEL, COMPREHENSIVE    Collection Time: 06/14/20  1:57 AM   Result Value Ref Range    Sodium 131 (L) 136 - 145 mmol/L    Potassium 4.4 3.5 - 5.1 mmol/L    Chloride 98 97 - 108 mmol/L    CO2 23 21 - 32 mmol/L    Anion gap 10 5 - 15 mmol/L    Glucose 78 65 - 100 mg/dL    BUN 33 (H) 6 - 20 MG/DL    Creatinine 1.89 (H) 0.55 - 1.02 MG/DL    BUN/Creatinine ratio 17 12 - 20      GFR est AA 32 (L) >60 ml/min/1.73m2    GFR est non-AA 27 (L) >60 ml/min/1.73m2    Calcium 7.6 (L) 8.5 - 10.1 MG/DL    Bilirubin, total 0.6 0.2 - 1.0 MG/DL    ALT (SGPT) 181 (H) 12 - 78 U/L    AST (SGOT) 73 (H) 15 - 37 U/L    Alk. phosphatase 133 (H) 45 - 117 U/L    Protein, total 5.8 (L) 6.4 - 8.2 g/dL    Albumin 2.2 (L) 3.5 - 5.0 g/dL    Globulin 3.6 2.0 - 4.0 g/dL    A-G Ratio 0.6 (L) 1.1 - 2.2     CBC W/O DIFF    Collection Time: 06/14/20  1:57 AM   Result Value Ref Range    WBC 15.8 (H) 3.6 - 11.0 K/uL    RBC 2.67 (L) 3.80 - 5.20 M/uL    HGB 8.8 (L) 11.5 - 16.0 g/dL    HCT 27.9 (L) 35.0 - 47.0 %    .5 (H) 80.0 - 99.0 FL    MCH 33.0 26.0 - 34.0 PG    MCHC 31.5 30.0 - 36.5 g/dL    RDW 14.4 11.5 - 14.5 %    PLATELET 617 865 - 163 K/uL    MPV 11.9 8.9 - 12.9 FL    NRBC 0.0 0  WBC    ABSOLUTE NRBC 0.00 0.00 - 0.01 K/uL   CK    Collection Time: 06/14/20  1:57 AM   Result Value Ref Range     (H) 26 - 192 U/L   C REACTIVE PROTEIN, QT    Collection Time: 06/14/20  1:57 AM   Result Value Ref Range    C-Reactive protein 22.60 (H) 0.00 - 0.60 mg/dL         IMPRESSION    #1 fever    #2 elevated liver enzymes    #3 myalgias    #4 History of breast cancer      PLAN    She has elevated liver enzymes with fever. Although this could be viral in origin, which should follow evaluate her for other causes. She claims she had tick bites a few weeks ago. I will check her for RMSF and Ehrlichia. I will start her on doxycycline pending these tests. Her right upper quadrant should be imaged either ultrasound or CT scan.   We can continue Unasyn for now.                   ___________________________________________________  ID: Tushar Monge MD       Patient seen and examined    - orders placed

## 2020-06-13 NOTE — PHYSICIAN ADVISORY
Letter of Status Determination: Current Status INPATIENT is Appropriate Pt Name:  Hina Kumari MR#  [id number] [facility]#   [id number] Room [facility] Hospital  408/02  @ . [facility] hospital  
Hospitalization date  6/12/2020  2:06 PM  
Current Attending Physician  Jey Garcia MD  
Principal diagnosis  Acute hypoxic resp failure Clinicals  81 y.o. y.o  female hospitalized with  Acute viral illness: with chills, body ache, dry cough, COIVD 19 neg, also send for other respiratory viral panel. Supportive care for now Acute hypoxia respiratory failure: CTA of chest reviewed. No singificant infiltration. She may have COPD due to her smoking history, remains hypoxic and on suppl oxygen. [facility] Does  NOT apply STATUS DETERMINATION  This patient is at high risk of adverse events and deterioration based on documented clinical data, comorbid conditions and current acute care course. Ms. Hina Kumari is expected to meet Inpatient Admission status criteria in accordance with CMS regulation Section 43 .3. Specifically, due to medical necessity the patient's stay is expected to exceed Two Midnights. It is our recommendation that this patient's hospitalization INPATIENT status is appropriate. The final decision of the patient's hospitalization status depends on the attending physician's judgment. Additional comments Insurance  Payor: [place] MEDICARE / Plan: VA MEDICARE PART A & B / Product Type: Medicare / Insurance [facility] PART A & B Phone: [phone] Subscriber: Kizzy Myles Subscriber#: [id number] Group#:  Precert#:   
   
 GENERIC COMMERCIAL/BSHSI GENERIC COMMERCIAL Phone: [phone] Subscriber: Kizzy Myles Subscriber#: [id number]  Group#: [id number] Precert#:   
  
 
 
The information in this document is a recommendation to be used for utilization review and utilization management purposes only. This recommendation is not an order. The recommendation is made based on the information reviewed at the time of the referral, is pursuant to the [facility] Conditions of Participation (42 CFR Part 482), and is neither a judgment nor an assessment with regard to the appropriateness or quality of clinical care. For all Managed Care patients: The Criteria are intended solely for use as screening guidelines with respect to the medical appropriateness of healthcare services and not for final clinical or payment determinations concerning the type or level of medical care provided, or proposed to be provided, to a patient. They help the reviewers determine whether a patient is appropriate for observation or inpatient admission at the time a decision to admit the patient is being made. All efforts are made to apply the pertinent payor criteria ([employer] or [employer]) as well as the clinical judgements based on the information reviewed at the time of the referral.\" Nothing in this document may be used to limit, alter, or affect clinical services provided to the patient named below. Kain Rowe MD 
Physician Advisor [employer] [address] C:  
[email or web address]

## 2020-06-13 NOTE — PROGRESS NOTES
Bedside and Verbal shift change report given to Pippa Triplett RN (oncoming nurse) by Iris Goodrich RN (offgoing nurse). Report included the following information SBAR, Kardex, ED Summary, MAR, Recent Results and Cardiac Rhythm sinus tach. Problem: Falls - Risk of  Goal: *Absence of Falls  Description: Document Ragena Glad Fall Risk and appropriate interventions in the flowsheet.   Outcome: Progressing Towards Goal  Note: Fall Risk Interventions:   Medication Interventions: Evaluate medications/consider consulting pharmacy, Patient to call before getting OOB          Problem: Pain  Goal: *Control of Pain  Outcome: Progressing Towards Goal  Note: Pt pain controled with Q4 morphine     Problem: General Infection Care Plan (Adult and Pediatric)  Goal: Improvement in signs and symptoms of infection  Outcome: Progressing Towards Goal  Note: Pt febrile during shift, controlled with PRN tylenol

## 2020-06-13 NOTE — PROGRESS NOTES
Hospitalist Progress Note  Michelle Resendiz MD  Answering service: [phone] from in house phone      Date of Service:  2020  NAME:  Radha Woods  :  1950  MRN:  [id number]    Admission Summary:   69F p/w SOB/body aches. Interval history / Subjective:   Patient seen and examined at bedside, feels weak, groggy, was given too much morphine? . Poor historian. Doesn't think her  will offer much history when offered to call him. Assessment & Plan:     #. Deranged LFTs: US RUQ pending. Monitor closely, if continues to rise will get hepatology cs  #. Fever/N/V/Body aches: flu -ve, COVID -ve x2 last few days. - start empiric IV abx, work up for possible gallbladder infection. BCs pending.  - Groggy from iv morphine- will dc this. #. SOB: mild/imaging unremarkable, wean off NC O2 as able. #. Anxiety disorder: chronic, stable, home regimen, PRN anxiolytics  #. HypoNatremia: mild, monitor    Code status: Full  DVT prophylaxis: Lovenox  Care Plan discussed with: Patient/Family and Nurse  Disposition: TBD     Hospital Problems  Date Reviewed: 2020          Codes Class Noted POA    SOB (shortness of breath) ICD-10-CM: R06.02  ICD-9-CM: 786.05  2020 Unknown            Review of Systems:   Pertinent items are mentioned in interval history. Vital Signs:    Last 24hrs VS reviewed since prior progress note.  Most recent are:  Visit Vitals  BP 91/57 (BP 1 Location: Left arm, BP Patient Position: At rest)   Pulse 95   Temp 98.7 °F (37.1 °C)   Resp 8   Ht 5' 2\" (1.575 m)   Wt 58 kg (127 lb 13.9 oz)   SpO2 94%   Breastfeeding No   BMI 23.39 kg/m²         Intake/Output Summary (Last 24 hours) at 2020 1147  Last data filed at 2020  Gross per 24 hour   Intake 100 ml   Output    Net 100 ml        Physical Examination:   General:  Alert, oriented, No acute distress  Resp:  No accessory muscle use, Good AE, no wheezes, no rhonchi  Abd:  Soft, non-tender, non-distended  Extremities:  No cyanosis or clubbing, no significant edema  Neuro:  Grossly normal, no focal neuro deficits, follows commands   Psych:  Good insight, AAOx3, not agitated. Data Review:    Review and/or order of clinical lab test  Review and/or order of tests in the radiology section of [facility]  Review and/or order of tests in the medicine section of [facility]  Labs:     Recent Labs     06/13/20  0030 06/12/20  1424   WBC 12.0* 8.9   HGB 9.8* 10.0*   HCT 31.0* 31.1*    159     Recent Labs     06/13/20  0028 06/12/20  1424   * 138   K 3.7 3.3*   CL 99 101   CO2 25 31   BUN 15 19   CREA 0.79 1.01   * 146*   CA 7.6* 8.4*   MG  --  2.0     Recent Labs     06/13/20  0028 06/12/20  1424   * 294*   * 136*   TBILI 0.5 0.4   TP 6.2* 6.1*   ALB 2.3* 2.4*   GLOB 3.9 3.7     Recent Labs     06/13/20  0027 06/12/20  1424   INR 1.1 1.1   PTP 11.5* 10.9   APTT 28.4  --       Recent Labs     06/13/20  0029   FERR 437*      Lab Results   Component Value Date/Time    Folate 23.1 (H) 02/23/2020 05:34 AM      No results for input(s): PH, PCO2, PO2 in the last 72 hours.   Recent Labs     06/12/20  1424   TROIQ <0.05     Lab Results   Component Value Date/Time    Cholesterol, total 132 02/23/2020 05:29 AM    HDL Cholesterol 61 02/23/2020 05:29 AM    LDL, calculated 61.8 02/23/2020 05:29 AM    Triglyceride 46 02/23/2020 05:29 AM    CHOL/HDL Ratio 2.2 02/23/2020 05:29 AM     No results found for: [facility]  Lab Results   Component Value Date/Time    Color YELLOW/STRAW 06/12/2020 04:57 PM    Appearance CLEAR 06/12/2020 04:57 PM    Specific gravity >1.030 (H) 06/12/2020 04:57 PM    pH (UA) 5.5 06/12/2020 04:57 PM    Protein Negative 06/12/2020 04:57 PM    Glucose Negative 06/12/2020 04:57 PM    Ketone Negative 06/12/2020 04:57 PM    Bilirubin Negative 06/12/2020 04:57 PM    Urobilinogen 1.0 06/12/2020 04:57 PM    Nitrites Negative 06/12/2020 04:57 PM Leukocyte Esterase Negative 06/12/2020 04:57 PM    Epithelial cells FEW 06/12/2020 04:57 PM    Bacteria Negative 06/12/2020 04:57 PM    WBC 0-4 06/12/2020 04:57 PM    RBC 5-10 06/12/2020 04:57 PM     Medications Reviewed:     Current Facility-Administered Medications   Medication Dose Route Frequency    enoxaparin (LOVENOX) injection 30 mg  30 mg SubCUTAneous Q12H    acetaminophen (TYLENOL) tablet 650 mg  650 mg Oral Q8H PRN    zinc sulfate (ZINCATE) 220 (50) mg capsule 1 Cap  1 Cap Oral DAILY    ascorbic acid (vitamin C) (VITAMIN C) tablet 1,000 mg  1,000 mg Oral BID    sodium chloride (NS) flush 5-40 mL  5-40 mL IntraVENous Q8H    sodium chloride (NS) flush 5-40 mL  5-40 mL IntraVENous PRN    naloxone (NARCAN) injection 0.4 mg  0.4 mg IntraVENous PRN    morphine 10 mg/ml injection 8 mg  8 mg IntraVENous Q4H PRN    ondansetron (ZOFRAN) injection 4 mg  4 mg IntraVENous Q4H PRN    LORazepam (ATIVAN) injection 1 mg  1 mg IntraVENous Q8H PRN    colestipoL (COLESTID) tablet 1 g  1 g Oral BID    FLUoxetine (PROzac) capsule 40 mg  40 mg Oral DAILY    albuterol (PROVENTIL HFA, VENTOLIN HFA, PROAIR HFA) inhaler 1 Puff  1 Puff Inhalation Q6H PRN   ______________________________________________________________________  EXPECTED LENGTH OF STAY: - - -  ACTUAL LENGTH OF STAY:          1               Jhony Ellison MD

## 2020-06-13 NOTE — PROGRESS NOTES
0409: Gave patient 4mg of morphine due to some noticed delayed speech. 0900: Patient states current delays in speech and memory are related to ECT she received years ago and never got her memory back from. 0969: Reassessed to give additional 4mg morphine to complete 8mg dose. Patient states she does not want additional 4mg. Also noticed patient's shallow RR of 10, SPO2 80% on room air. O2 titrated to 3lpm. SPO2 94%. No dsypnea noted at any time. 0940: Notified Dr. Derald Canavan of patient's response to morphine, RR, SPO2, and mental status. 1050: Spoke with . Patient had a small bug bite 3 weeks ago on her left calf/ankle. Patient received antibiotics at that time. Dr. Derald Canavan made aware. 1200: Patient made NPO for ultrasound. Ultrasound to be performed ~1800.    1540: TRANSFER - OUT REPORT:    Verbal report given to Elzbieta Ware RN(name) on [employer]  being transferred to (unit) for routine progression of care       Report consisted of patients Situation, Background, Assessment and   Recommendations(SBAR). Information from the following report(s) SBAR, Kardex, Intake/Output, MAR, Recent Results, Med Rec Status and Cardiac Rhythm NSR was reviewed with the receiving nurse.     Lines:   Peripheral IV 06/12/20 Right Antecubital (Active)   Site Assessment Clean, dry, & intact 6/13/2020  8:37 AM   Phlebitis Assessment 0 6/13/2020  8:37 AM   Infiltration Assessment 0 6/13/2020  8:37 AM   Dressing Status Clean, dry, & intact 6/13/2020  8:37 AM   Dressing Type Transparent;Tape 6/13/2020  8:37 AM   Hub Color/Line Status Pink;Capped 6/13/2020  8:37 AM   Action Taken Open ports on tubing capped 6/13/2020  3:18 AM   Alcohol Cap Used Yes 6/13/2020  8:37 AM       Peripheral IV 06/12/20 Left Antecubital (Active)   Site Assessment Clean, dry, & intact 6/13/2020  8:37 AM   Phlebitis Assessment 0 6/13/2020  8:37 AM   Infiltration Assessment 0 6/13/2020  8:37 AM   Dressing Status Clean, dry, & intact 6/13/2020  8:37 AM   Dressing Type Transparent;Tape 6/13/2020  8:37 AM   Hub Color/Line Status Pink;Capped 6/13/2020  8:37 AM   Action Taken Open ports on tubing capped 6/13/2020  3:18 AM   Alcohol Cap Used Yes 6/13/2020  8:37 AM        Opportunity for questions and clarification was provided.       Patient transported with:   O2 @ 3 liters  Tech

## 2020-06-14 LAB
ALBUMIN SERPL-MCNC: 2.2 G/DL (ref 3.5–5)
ALBUMIN/GLOB SERPL: 0.6 {RATIO} (ref 1.1–2.2)
ALP SERPL-CCNC: 133 U/L (ref 45–117)
ALT SERPL-CCNC: 181 U/L (ref 12–78)
ANION GAP SERPL CALC-SCNC: 10 MMOL/L (ref 5–15)
AST SERPL-CCNC: 73 U/L (ref 15–37)
BILIRUB SERPL-MCNC: 0.6 MG/DL (ref 0.2–1)
BUN SERPL-MCNC: 33 MG/DL (ref 6–20)
BUN/CREAT SERPL: 17 (ref 12–20)
CALCIUM SERPL-MCNC: 7.6 MG/DL (ref 8.5–10.1)
CHLORIDE SERPL-SCNC: 98 MMOL/L (ref 97–108)
CK SERPL-CCNC: 223 U/L (ref 26–192)
CO2 SERPL-SCNC: 23 MMOL/L (ref 21–32)
CREAT SERPL-MCNC: 1.89 MG/DL (ref 0.55–1.02)
CREAT UR-MCNC: 87.9 MG/DL
CRP SERPL-MCNC: 22.6 MG/DL (ref 0–0.6)
ERYTHROCYTE [DISTWIDTH] IN BLOOD BY AUTOMATED COUNT: 14.4 % (ref 11.5–14.5)
GLOBULIN SER CALC-MCNC: 3.6 G/DL (ref 2–4)
GLUCOSE SERPL-MCNC: 78 MG/DL (ref 65–100)
HCT VFR BLD AUTO: 27.9 % (ref 35–47)
HEMOCCULT STL QL: NEGATIVE
HGB BLD-MCNC: 8.8 G/DL (ref 11.5–16)
MCH RBC QN AUTO: 33 PG (ref 26–34)
MCHC RBC AUTO-ENTMCNC: 31.5 G/DL (ref 30–36.5)
MCV RBC AUTO: 104.5 FL (ref 80–99)
NRBC # BLD: 0 K/UL (ref 0–0.01)
NRBC BLD-RTO: 0 PER 100 WBC
PLATELET # BLD AUTO: 211 K/UL (ref 150–400)
PMV BLD AUTO: 11.9 FL (ref 8.9–12.9)
POTASSIUM SERPL-SCNC: 4.4 MMOL/L (ref 3.5–5.1)
PROT SERPL-MCNC: 5.8 G/DL (ref 6.4–8.2)
PROT UR-MCNC: 78 MG/DL (ref 0–11.9)
RBC # BLD AUTO: 2.67 M/UL (ref 3.8–5.2)
SODIUM SERPL-SCNC: 131 MMOL/L (ref 136–145)
SODIUM UR-SCNC: 31 MMOL/L
T3FREE SERPL-MCNC: <0.5 PG/ML (ref 2.2–4)
TSH SERPL DL<=0.05 MIU/L-ACNC: 4.32 UIU/ML (ref 0.36–3.74)
UR CULT HOLD, URHOLD: NORMAL
VIT B12 SERPL-MCNC: 1644 PG/ML (ref 193–986)
WBC # BLD AUTO: 15.8 K/UL (ref 3.6–11)

## 2020-06-14 PROCEDURE — 82550 ASSAY OF CK (CPK): CPT

## 2020-06-14 PROCEDURE — 84156 ASSAY OF PROTEIN URINE: CPT

## 2020-06-14 PROCEDURE — 85027 COMPLETE CBC AUTOMATED: CPT

## 2020-06-14 PROCEDURE — 86666 EHRLICHIA ANTIBODY: CPT

## 2020-06-14 PROCEDURE — 84443 ASSAY THYROID STIM HORMONE: CPT

## 2020-06-14 PROCEDURE — 74011250637 HC RX REV CODE- 250/637: Performed by: HOSPITALIST

## 2020-06-14 PROCEDURE — 65270000029 HC RM PRIVATE

## 2020-06-14 PROCEDURE — 80053 COMPREHEN METABOLIC PANEL: CPT

## 2020-06-14 PROCEDURE — 74011250636 HC RX REV CODE- 250/636: Performed by: INTERNAL MEDICINE

## 2020-06-14 PROCEDURE — 74011000258 HC RX REV CODE- 258: Performed by: INTERNAL MEDICINE

## 2020-06-14 PROCEDURE — 82570 ASSAY OF URINE CREATININE: CPT

## 2020-06-14 PROCEDURE — 84300 ASSAY OF URINE SODIUM: CPT

## 2020-06-14 PROCEDURE — 36415 COLL VENOUS BLD VENIPUNCTURE: CPT

## 2020-06-14 PROCEDURE — 77010033678 HC OXYGEN DAILY

## 2020-06-14 PROCEDURE — 74011000250 HC RX REV CODE- 250: Performed by: HOSPITALIST

## 2020-06-14 PROCEDURE — 86757 RICKETTSIA ANTIBODY: CPT

## 2020-06-14 PROCEDURE — 86140 C-REACTIVE PROTEIN: CPT

## 2020-06-14 PROCEDURE — 74011250636 HC RX REV CODE- 250/636: Performed by: HOSPITALIST

## 2020-06-14 PROCEDURE — 82272 OCCULT BLD FECES 1-3 TESTS: CPT

## 2020-06-14 PROCEDURE — 84481 FREE ASSAY (FT-3): CPT

## 2020-06-14 PROCEDURE — 82607 VITAMIN B-12: CPT

## 2020-06-14 RX ORDER — CLONAZEPAM 0.5 MG/1
0.5 TABLET ORAL
Status: DISCONTINUED | OUTPATIENT
Start: 2020-06-14 | End: 2020-06-16

## 2020-06-14 RX ORDER — LORAZEPAM 2 MG/ML
2 INJECTION INTRAMUSCULAR
Status: DISCONTINUED | OUTPATIENT
Start: 2020-06-14 | End: 2020-06-16 | Stop reason: HOSPADM

## 2020-06-14 RX ORDER — SODIUM CHLORIDE 9 MG/ML
100 INJECTION, SOLUTION INTRAVENOUS CONTINUOUS
Status: DISCONTINUED | OUTPATIENT
Start: 2020-06-14 | End: 2020-06-16

## 2020-06-14 RX ORDER — IBUPROFEN 200 MG
1 TABLET ORAL EVERY 24 HOURS
Status: DISCONTINUED | OUTPATIENT
Start: 2020-06-14 | End: 2020-06-16 | Stop reason: HOSPADM

## 2020-06-14 RX ORDER — ENOXAPARIN SODIUM 100 MG/ML
30 INJECTION SUBCUTANEOUS EVERY 24 HOURS
Status: DISCONTINUED | OUTPATIENT
Start: 2020-06-14 | End: 2020-06-15

## 2020-06-14 RX ADMIN — ENOXAPARIN SODIUM 30 MG: 30 INJECTION SUBCUTANEOUS at 23:02

## 2020-06-14 RX ADMIN — SODIUM CHLORIDE 100 ML/HR: 900 INJECTION, SOLUTION INTRAVENOUS at 10:13

## 2020-06-14 RX ADMIN — Medication 10 ML: at 02:29

## 2020-06-14 RX ADMIN — LORAZEPAM 2 MG: 2 INJECTION INTRAMUSCULAR; INTRAVENOUS at 23:04

## 2020-06-14 RX ADMIN — DOXYCYCLINE 100 MG: 100 INJECTION, POWDER, LYOPHILIZED, FOR SOLUTION INTRAVENOUS at 20:46

## 2020-06-14 RX ADMIN — AMPICILLIN SODIUM AND SULBACTAM SODIUM 3 G: 2; 1 INJECTION, POWDER, FOR SOLUTION INTRAMUSCULAR; INTRAVENOUS at 08:14

## 2020-06-14 RX ADMIN — AMPICILLIN SODIUM AND SULBACTAM SODIUM 3 G: 2; 1 INJECTION, POWDER, FOR SOLUTION INTRAMUSCULAR; INTRAVENOUS at 02:28

## 2020-06-14 RX ADMIN — DOXYCYCLINE 100 MG: 100 INJECTION, POWDER, LYOPHILIZED, FOR SOLUTION INTRAVENOUS at 10:09

## 2020-06-14 RX ADMIN — Medication 10 ML: at 08:15

## 2020-06-14 RX ADMIN — AMPICILLIN SODIUM AND SULBACTAM SODIUM 3 G: 2; 1 INJECTION, POWDER, FOR SOLUTION INTRAMUSCULAR; INTRAVENOUS at 14:14

## 2020-06-14 RX ADMIN — FOLIC ACID: 5 INJECTION, SOLUTION INTRAMUSCULAR; INTRAVENOUS; SUBCUTANEOUS at 18:48

## 2020-06-14 RX ADMIN — Medication 10 ML: at 20:46

## 2020-06-14 RX ADMIN — Medication 10 ML: at 14:14

## 2020-06-14 RX ADMIN — LORAZEPAM 2 MG: 2 INJECTION INTRAMUSCULAR; INTRAVENOUS at 20:45

## 2020-06-14 RX ADMIN — ACETAMINOPHEN 650 MG: 325 TABLET, FILM COATED ORAL at 02:06

## 2020-06-14 RX ADMIN — CLONAZEPAM 0.5 MG: 1 TABLET ORAL at 14:25

## 2020-06-14 RX ADMIN — FLUOXETINE 40 MG: 20 CAPSULE ORAL at 08:10

## 2020-06-14 NOTE — PROGRESS NOTES
Messaged MD Hines - pt states that the fine motor controls in her fingers of both hands seems to be getting worse than normal since last night. Please come and see when you can      MD Advised that he is not following this patient today. Will find who the MD is.    [id number] MD Alejandra Byrd is the attending, advised him of patients concern.

## 2020-06-14 NOTE — PROGRESS NOTES
MD notified of CIWA done at 80. Ativan ordered is for CIWA of 8-11, but patient's CIWA is not within this range. He will change the scale. 2000 : New orders placed. Ativan to be given for CIWA >8.

## 2020-06-14 NOTE — PROGRESS NOTES
Hospitalist Progress Note  Whitney Bryant MD  Answering service: [phone] OR [phone] from in house phone      Date of Service:  2020  NAME:  Danyelle Solano  :  1950  MRN:  [id number]    Admission Summary:   Danyelle Solano is a 81 y.o. female who presents with whole body ache and chills      This is 81  female with past medical history notable for pneumonia,  tobacco abuse, chronic neck pain, breast cancer status post lumpectomy and radiation in , anemia, anxiety [facility] and pains, nausea, and persistent chest pain, chills  and shortness of breath.  She was seen in ER about a week ago, for dry cough, sob, headache and she had covid 19 test at that time was negative and she was given azithromycin and completed the course for a possible right lower lobe pneumonia on chest x-ray. No fever documented, pt but has intermittent chills at home then increasing whole body ache last several days, pt has mild dry cough,  noted that her pulse oximetry at home was 88-93% on room air after she had walked back to her bed from the bathroom.  No diagnose of asthma or COPD, no hx structural heart disease.  No urinary complaints.  Was hospitalized in February for pneumonia and had a reassuring echocardiogram at the time.   pain, cough, congestion, recent illness, palpitations, or dysuria. Pt lives home with . She is a retired icu nurse.     Interval history / Subjective:   F/u Fever/bodyaches  Leukocytosis worse  Yesterday hypotensive  Now SHIVA  Complaining of problems with fine motor controls in both hands  Seems very anxious     Assessment & Plan:     Fever/N/V/Body aches/transaminitis  - flu -ve, COVID -ve x2   -Blood culture  no growth so far  -Hep panel negative  -Follow Rickettsial ab  -US abd  Distended gallbladder with sludge and dilated CBD, compatible with biliary dysfunction  Incidental left pleural transudate  -On empiric Unasyn. Doxy added by ID 6/13  -Transaminitis improving  -Appreciate ID    SOB/COPD  -mild/imaging unremarkable, wean off NC O2 as able. -outpatient PFTs    SHIVA  -probably ATN from contrast nephropathy/hypotension  -IV fluids  -follow work up    Weakness both hands  -With my examination could not really appreciate any focal weakness or any sensory loss  -? MRI, would hold for now as I think most likely it is sec to anxiety    Anxiety disorder  -chronic, but seems very anxious   -Will consult psych    Hyponatremia: mild, monitor    Alcohol use  -every night one beer/vodka  -?alcohol withdrawal    Smoking  -nicotine patch  -Counseled    Regular diet    Code status: Full  DVT prophylaxis: Lovenox  PTA: home  Baseline: independent  Spoke to the patient's  on phone after getting permission from the patient    Plan: Follow ID    Care Plan discussed with: Patient/Family and Nurse  Disposition: TBD     Hospital Problems  Date Reviewed: 6/14/2020          Codes Class Noted POA    * (Principal) SOB (shortness of breath) ICD-10-CM: R06.02  ICD-9-CM: 786.05  6/12/2020 Yes            Review of Systems:   Pertinent items are mentioned in interval history. Vital Signs:    Last 24hrs VS reviewed since prior progress note. Most recent are:  Visit Vitals  /58 (BP 1 Location: Right arm, BP Patient Position: At rest)   Pulse 96   Temp 98.2 °F (36.8 °C)   Resp 16   Ht 5' 2\" (1.575 m)   Wt 58 kg (127 lb 13.9 oz)   SpO2 96%   Breastfeeding No   BMI 23.39 kg/m²       No intake or output data in the 24 hours ending 06/14/20 0852     Physical Examination:   General:  Alert, oriented, No acute distress  Resp:  No accessory muscle use, Good AE, no wheezes, no rhonchi  Abd:  Soft, non-tender, non-distended  Extremities:  No cyanosis or clubbing, no significant edema  Neuro:  Grossly normal, no focal neuro deficits, follows commands   Psych:  Good insight, AAOx3, not agitated.     Data Review: Review and/or order of clinical lab test  Review and/or order of tests in the radiology section of [facility]  Review and/or order of tests in the medicine section of [facility]  Labs:     Recent Labs     06/14/20 0157 06/13/20  0030   WBC 15.8* 12.0*   HGB 8.8* 9.8*   HCT 27.9* 31.0*    163     Recent Labs     06/14/20  0157 06/13/20  0028 06/12/20  1424   * 130* 138   K 4.4 3.7 3.3*   CL 98 99 101   CO2 23 25 31   BUN 33* 15 19   CREA 1.89* 0.79 1.01   GLU 78 139* 146*   CA 7.6* 7.6* 8.4*   MG  --   --  2.0     Recent Labs     06/14/20 0157 06/13/20  0028 06/12/20  1424   * 270* 294*   * 149* 136*   TBILI 0.6 0.5 0.4   TP 5.8* 6.2* 6.1*   ALB 2.2* 2.3* 2.4*   GLOB 3.6 3.9 3.7     Recent Labs     06/13/20  0027 06/12/20  1424   INR 1.1 1.1   PTP 11.5* 10.9   APTT 28.4  --       Recent Labs     06/13/20  0029   FERR 437*      Lab Results   Component Value Date/Time    Folate 23.1 (H) 02/23/2020 05:34 AM      No results for input(s): PH, PCO2, PO2 in the last 72 hours.   Recent Labs     06/14/20 0157 06/12/20  1424   *  --    TROIQ  --  <0.05     Lab Results   Component Value Date/Time    Cholesterol, total 132 02/23/2020 05:29 AM    HDL Cholesterol 61 02/23/2020 05:29 AM    LDL, calculated 61.8 02/23/2020 05:29 AM    Triglyceride 46 02/23/2020 05:29 AM    CHOL/HDL Ratio 2.2 02/23/2020 05:29 AM     No results found for: [facility]  Lab Results   Component Value Date/Time    Color YELLOW/STRAW 06/12/2020 04:57 PM    Appearance CLEAR 06/12/2020 04:57 PM    Specific gravity >1.030 (H) 06/12/2020 04:57 PM    pH (UA) 5.5 06/12/2020 04:57 PM    Protein Negative 06/12/2020 04:57 PM    Glucose Negative 06/12/2020 04:57 PM    Ketone Negative 06/12/2020 04:57 PM    Bilirubin Negative 06/12/2020 04:57 PM    Urobilinogen 1.0 06/12/2020 04:57 PM    Nitrites Negative 06/12/2020 04:57 PM    Leukocyte Esterase Negative 06/12/2020 04:57 PM    Epithelial cells FEW 06/12/2020 04:57 PM    Bacteria Negative 06/12/2020 04:57 PM    WBC 0-4 06/12/2020 04:57 PM    RBC 5-10 06/12/2020 04:57 PM     Medications Reviewed:     Current Facility-Administered Medications   Medication Dose Route Frequency    enoxaparin (LOVENOX) injection 30 mg  30 mg SubCUTAneous Q24H    ampicillin-sulbactam (UNASYN) 3 g in 0.9% sodium chloride (MBP/ADV) 100 mL  3 g IntraVENous N5U    methyl salicylate-menthol (BENGAY) 15-10 % cream   Topical Q6H PRN    doxycycline (VIBRAMYCIN) 100 mg in 0.9% sodium chloride (MBP/ADV) 100 mL  100 mg IntraVENous Q12H    acetaminophen (TYLENOL) tablet 650 mg  650 mg Oral Q8H PRN    sodium chloride (NS) flush 5-40 mL  5-40 mL IntraVENous Q8H    sodium chloride (NS) flush 5-40 mL  5-40 mL IntraVENous PRN    naloxone (NARCAN) injection 0.4 mg  0.4 mg IntraVENous PRN    ondansetron (ZOFRAN) injection 4 mg  4 mg IntraVENous Q4H PRN    LORazepam (ATIVAN) injection 1 mg  1 mg IntraVENous Q8H PRN    FLUoxetine (PROzac) capsule 40 mg  40 mg Oral DAILY    albuterol (PROVENTIL HFA, VENTOLIN HFA, PROAIR HFA) inhaler 1 Puff  1 Puff Inhalation Q6H PRN   ______________________________________________________________________  EXPECTED LENGTH OF STAY: - - -  ACTUAL LENGTH OF STAY:          2               Afua Milton MD

## 2020-06-14 NOTE — PROGRESS NOTES
Renal Dosing/Monitoring  Medication: Unasyn   Current regimen:  3 grams  every 6 hr  Recent Labs     06/14/20  0157 06/13/20  0028 06/12/20  1424   CREA 1.89* 0.79 1.01   BUN 33* 15 19     Estimated CrCl:  22.8 ml/min  Plan: Change to 3 grams IV every 12 hours  per [facility] P&T Committee Protocol with respect to renal function. Pharmacy will continue to monitor patient daily and will make dosage adjustments based upon changing renal function.     Naima Dewitt, PharmD, BCPS

## 2020-06-14 NOTE — PROGRESS NOTES
Problem: Risk for Spread of Infection  Goal: Prevent transmission of infectious organism to others  Description: Prevent the transmission of infectious organisms to other patients, staff members, and visitors. Outcome: Progressing Towards Goal     Problem: Patient Education:  Go to Education Activity  Goal: Patient/Family Education  Outcome: Progressing Towards Goal     Problem: Falls - Risk of  Goal: *Absence of Falls  Description: Document Simeon Chambers Fall Risk and appropriate interventions in the flowsheet.   Outcome: Progressing Towards Goal  Note: Fall Risk Interventions:  Mobility Interventions: Bed/chair exit alarm    Mentation Interventions: Bed/chair exit alarm    Medication Interventions: Bed/chair exit alarm    Elimination Interventions: Call light in reach     Problem: Patient Education: Go to Patient Education Activity  Goal: Patient/Family Education  Outcome: Progressing Towards Goal     Problem: Pain  Goal: *Control of Pain  Outcome: Progressing Towards Goal  Goal: *PALLIATIVE CARE:  Alleviation of Pain  Outcome: Progressing Towards Goal     Problem: Patient Education: Go to Patient Education Activity  Goal: Patient/Family Education  Outcome: Progressing Towards Goal     Problem: General Infection Care Plan (Adult and Pediatric)  Goal: Improvement in signs and symptoms of infection  Outcome: Progressing Towards Goal  Goal: *Optimize nutritional status  Outcome: Progressing Towards Goal     Problem: Patient Education: Go to Patient Education Activity  Goal: Patient/Family Education  Outcome: Progressing Towards Goal

## 2020-06-14 NOTE — PROGRESS NOTES
ID Progress Note  2020    Subjective:     Had fever earlier. She says she could not sleep last night. She was bothered by the IV. She does not have any headache or sore throat. She does not have any shortness of breath. No abdominal pain. Review of systems: No anaphylaxis, seizures, hematemesis, nausea    Objective:     Vitals:   Visit Vitals  /61 (BP 1 Location: Right arm, BP Patient Position: At rest)   Pulse 88   Temp 98.5 °F (36.9 °C)   Resp 17   Ht 5' 2\" (1.575 m)   Wt 58 kg (127 lb 13.9 oz)   SpO2 95%   Breastfeeding No   BMI 23.39 kg/m²        Tmax:  Temp (24hrs), Av.1 °F (37.3 °C), Min:98.2 °F (36.8 °C), Max:100.7 °F (38.2 °C)      Exam:    Not in distress  Eyes: pink conjunctivae, anicteric sclerae  Lungs: clear to auscultation, no rales wheezes or rhonchi  Heart: s1, s2, no murmurs, rubs or clicks  Abdomen: soft, nontender, no guarding or rebound. No Rodriguez sign  Extremities: Knees are not warm and not tender  Speech fluent  She seems to be anxious  Labs:   Lab Results   Component Value Date/Time    WBC 15.8 (H) 2020 01:57 AM    HGB 8.8 (L) 2020 01:57 AM    HCT 27.9 (L) 2020 01:57 AM    PLATELET 531  01:57 AM    .5 (H) 2020 01:57 AM     Lab Results   Component Value Date/Time    Sodium 131 (L) 2020 01:57 AM    Potassium 4.4 2020 01:57 AM    Chloride 98 2020 01:57 AM    CO2 23 2020 01:57 AM    Anion gap 10 2020 01:57 AM    Glucose 78 2020 01:57 AM    BUN 33 (H) 2020 01:57 AM    Creatinine 1.89 (H) 2020 01:57 AM    BUN/Creatinine ratio 17 2020 01:57 AM    GFR est AA 32 (L) 2020 01:57 AM    GFR est non-AA 27 (L) 2020 01:57 AM    Calcium 7.6 (L) 2020 01:57 AM    Bilirubin, total 0.6 2020 01:57 AM    Alk.  phosphatase 133 (H) 2020 01:57 AM    Protein, total 5.8 (L) 2020 01:57 AM    Albumin 2.2 (L) 2020 01:57 AM    Globulin 3.6 2020 01:57 AM    A-G Ratio 0.6 (L) 06/14/2020 01:57 AM    ALT (SGPT) 181 (H) 06/14/2020 01:57 AM     US - dilated cbd     Assessment:     #1 fever     #2 elevated liver enzymes     #3 myalgias     #4 History of breast cancer    #5 acute renal failure     #6 significant alcohol use        Recommendations:     1.her creatinine is now abnormal.  This could either be from the contrast she got from the CAT scan or possibly even Unasyn. I will get urine eos. I will also discontinue Unasyn. if creatinine rises tomorrow, we should probably ask renal to see her. 2.  The patient admitted to me that she drinks a martini at least 5 nights a week. She has some tremulousness. I wonder if she is going into DTs. I have discussed this already with Dr. Mk Augustin and he will intervene.     3.  Follow-up serologies  Michaelle Cherry MD

## 2020-06-15 ENCOUNTER — TELEPHONE (OUTPATIENT)
Dept: INTERNAL MEDICINE CLINIC | Age: 70
End: 2020-06-15

## 2020-06-15 LAB
A PHAGOCYTOPH IGG TITR SER IF: NEGATIVE {TITER}
A PHAGOCYTOPH IGM TITR SER IF: NEGATIVE {TITER}
ALBUMIN SERPL-MCNC: 2.3 G/DL (ref 3.5–5)
ALBUMIN/GLOB SERPL: 0.6 {RATIO} (ref 1.1–2.2)
ALP SERPL-CCNC: 139 U/L (ref 45–117)
ALT SERPL-CCNC: 153 U/L (ref 12–78)
ANION GAP SERPL CALC-SCNC: 6 MMOL/L (ref 5–15)
AST SERPL-CCNC: 66 U/L (ref 15–37)
BASOPHILS # BLD: 0 K/UL (ref 0–0.1)
BASOPHILS NFR BLD: 0 % (ref 0–1)
BILIRUB SERPL-MCNC: 0.5 MG/DL (ref 0.2–1)
BUN SERPL-MCNC: 28 MG/DL (ref 6–20)
BUN/CREAT SERPL: 32 (ref 12–20)
CALCIUM SERPL-MCNC: 8.6 MG/DL (ref 8.5–10.1)
CHLORIDE SERPL-SCNC: 102 MMOL/L (ref 97–108)
CO2 SERPL-SCNC: 25 MMOL/L (ref 21–32)
CREAT SERPL-MCNC: 0.87 MG/DL (ref 0.55–1.02)
DIFFERENTIAL METHOD BLD: ABNORMAL
E CHAFFEENSIS IGG TITR SER IF: NEGATIVE {TITER}
E CHAFFEENSIS IGM TITR SER IF: NEGATIVE {TITER}
EOSINOPHIL # BLD: 0 K/UL (ref 0–0.4)
EOSINOPHIL NFR BLD: 0 % (ref 0–7)
ERYTHROCYTE [DISTWIDTH] IN BLOOD BY AUTOMATED COUNT: 13.9 % (ref 11.5–14.5)
FOLATE SERPL-MCNC: 27.4 NG/ML (ref 5–21)
GLOBULIN SER CALC-MCNC: 3.8 G/DL (ref 2–4)
GLUCOSE SERPL-MCNC: 90 MG/DL (ref 65–100)
HCT VFR BLD AUTO: 26.9 % (ref 35–47)
HGB BLD-MCNC: 8.5 G/DL (ref 11.5–16)
IMM GRANULOCYTES # BLD AUTO: 0 K/UL
IMM GRANULOCYTES NFR BLD AUTO: 0 %
IRON SATN MFR SERPL: 40 % (ref 20–50)
IRON SERPL-MCNC: 80 UG/DL (ref 35–150)
LYMPHOCYTES # BLD: 5.4 K/UL (ref 0.8–3.5)
LYMPHOCYTES NFR BLD: 51 % (ref 12–49)
MCH RBC QN AUTO: 32.9 PG (ref 26–34)
MCHC RBC AUTO-ENTMCNC: 31.6 G/DL (ref 30–36.5)
MCV RBC AUTO: 104.3 FL (ref 80–99)
MONOCYTES # BLD: 1 K/UL (ref 0–1)
MONOCYTES NFR BLD: 9 % (ref 5–13)
NEUTS BAND NFR BLD MANUAL: 1 % (ref 0–6)
NEUTS SEG # BLD: 4.3 K/UL (ref 1.8–8)
NEUTS SEG NFR BLD: 39 % (ref 32–75)
NRBC # BLD: 0 K/UL (ref 0–0.01)
NRBC BLD-RTO: 0 PER 100 WBC
PLATELET # BLD AUTO: 247 K/UL (ref 150–400)
PLATELET COMMENTS,PCOM: ABNORMAL
PMV BLD AUTO: 11.1 FL (ref 8.9–12.9)
POTASSIUM SERPL-SCNC: 3.8 MMOL/L (ref 3.5–5.1)
PROT SERPL-MCNC: 6.1 G/DL (ref 6.4–8.2)
RBC # BLD AUTO: 2.58 M/UL (ref 3.8–5.2)
RBC MORPH BLD: ABNORMAL
RBC MORPH BLD: ABNORMAL
RICK SF IGG TITR SER IF: NORMAL {TITER}
RICK SF IGM TITR SER IF: NORMAL {TITER}
RICK TYPHUS IGG TITR SER IF: NORMAL {TITER}
RICK TYPHUS IGM TITR SER IF: NORMAL {TITER}
SODIUM SERPL-SCNC: 133 MMOL/L (ref 136–145)
TIBC SERPL-MCNC: 201 UG/DL (ref 250–450)
VIT B12 SERPL-MCNC: 1749 PG/ML (ref 193–986)
WBC # BLD AUTO: 10.7 K/UL (ref 3.6–11)

## 2020-06-15 PROCEDURE — 82607 VITAMIN B-12: CPT

## 2020-06-15 PROCEDURE — 74011250636 HC RX REV CODE- 250/636: Performed by: NURSE PRACTITIONER

## 2020-06-15 PROCEDURE — 74011250637 HC RX REV CODE- 250/637: Performed by: HOSPITALIST

## 2020-06-15 PROCEDURE — 36415 COLL VENOUS BLD VENIPUNCTURE: CPT

## 2020-06-15 PROCEDURE — 74011250636 HC RX REV CODE- 250/636: Performed by: HOSPITALIST

## 2020-06-15 PROCEDURE — 74011000250 HC RX REV CODE- 250: Performed by: HOSPITALIST

## 2020-06-15 PROCEDURE — 85025 COMPLETE CBC W/AUTO DIFF WBC: CPT

## 2020-06-15 PROCEDURE — 80053 COMPREHEN METABOLIC PANEL: CPT

## 2020-06-15 PROCEDURE — 65660000000 HC RM CCU STEPDOWN

## 2020-06-15 PROCEDURE — 74011000258 HC RX REV CODE- 258: Performed by: INTERNAL MEDICINE

## 2020-06-15 PROCEDURE — 83540 ASSAY OF IRON: CPT

## 2020-06-15 PROCEDURE — 82746 ASSAY OF FOLIC ACID SERUM: CPT

## 2020-06-15 PROCEDURE — 74011250636 HC RX REV CODE- 250/636: Performed by: INTERNAL MEDICINE

## 2020-06-15 RX ORDER — LORAZEPAM 2 MG/ML
1 INJECTION INTRAMUSCULAR
Status: DISCONTINUED | OUTPATIENT
Start: 2020-06-15 | End: 2020-06-16 | Stop reason: HOSPADM

## 2020-06-15 RX ORDER — ENOXAPARIN SODIUM 100 MG/ML
40 INJECTION SUBCUTANEOUS EVERY 24 HOURS
Status: DISCONTINUED | OUTPATIENT
Start: 2020-06-15 | End: 2020-06-16 | Stop reason: HOSPADM

## 2020-06-15 RX ADMIN — LORAZEPAM 2 MG: 2 INJECTION INTRAMUSCULAR; INTRAVENOUS at 15:15

## 2020-06-15 RX ADMIN — Medication 1 CAPSULE: at 09:23

## 2020-06-15 RX ADMIN — LORAZEPAM 1 MG: 2 INJECTION INTRAMUSCULAR; INTRAVENOUS at 17:43

## 2020-06-15 RX ADMIN — Medication 10 ML: at 22:26

## 2020-06-15 RX ADMIN — LORAZEPAM 2 MG: 2 INJECTION INTRAMUSCULAR; INTRAVENOUS at 01:27

## 2020-06-15 RX ADMIN — DOXYCYCLINE 100 MG: 100 INJECTION, POWDER, LYOPHILIZED, FOR SOLUTION INTRAVENOUS at 09:23

## 2020-06-15 RX ADMIN — FLUOXETINE 40 MG: 20 CAPSULE ORAL at 09:23

## 2020-06-15 RX ADMIN — LORAZEPAM 1 MG: 2 INJECTION INTRAMUSCULAR; INTRAVENOUS at 09:23

## 2020-06-15 RX ADMIN — Medication 10 ML: at 03:06

## 2020-06-15 RX ADMIN — FOLIC ACID: 5 INJECTION, SOLUTION INTRAMUSCULAR; INTRAVENOUS; SUBCUTANEOUS at 18:37

## 2020-06-15 RX ADMIN — CLONAZEPAM 0.5 MG: 1 TABLET ORAL at 22:15

## 2020-06-15 RX ADMIN — ENOXAPARIN SODIUM 40 MG: 40 INJECTION SUBCUTANEOUS at 22:25

## 2020-06-15 RX ADMIN — LORAZEPAM 1 MG: 2 INJECTION INTRAMUSCULAR; INTRAVENOUS at 11:14

## 2020-06-15 RX ADMIN — Medication 10 ML: at 13:04

## 2020-06-15 RX ADMIN — DOXYCYCLINE 100 MG: 100 INJECTION, POWDER, LYOPHILIZED, FOR SOLUTION INTRAVENOUS at 22:25

## 2020-06-15 NOTE — PROGRESS NOTES
0730: Bedside shift change report given to Rosalind Hernández, RN, (oncoming nurse) by Hubert Goyal, DUSTY, (offgoing nurse). Report included the following information SBAR, Kardex, Intake/Output, MAR, Accordion, Recent Results and Cardiac Rhythm NSR.     0915: Sitter reporting patient is restless and trying to [place] home. \" RN in to reassess CIWA early and follow current CIWA protocol. 1100: Patient restless and agitated up reassessment. States she wants to go home. CIWA 8. When RN went to get ativan, patient attempted to get up to chair by self with sitter in room. Sitter able to assist patient into chair. Ativan given per CIWA protocol. Patient still insisting she leave. Asked patient to wait to see physician and RN to page him. Patient also took out nicotine gum. RN informed patient she had a nicotine patch prescribed, and patient stated she did not want it and that she wanted to take her nicotine gum instead. RN educated patient on hospital policy regarding medications. Patient stated she didn't care and that \"it's my body. \" Patient took gum. 1120: Paged Dr. Starr Orozco. 1125: Spoke to Starr Orozco MD, regarding CIWA scores, patient requesting to leave, and nicotine gum usage. David to come see patient. 1200: Starr Orozco MD, saw patient and stated for patient to stay. 1300: Patient refusing ativan for CIWA of 12. Patient stating she \"wants to go home. \Ashley Hwang MD, paged. RN spoke with nurse manager regarding situation. 1315Alisia Adams MD, returned page. Stated patient unable to make own medical decisions. RN to consult nurse manager regarding proper protocol going forward with patient. 1330Alisia Adams MD[facility]      [phone]: Patient refused ativan for CIWA.    1600: Nursing supervisor called regarding Crisis assistance again and left message. Will continue to monitor. 1700: Nursing supervisor informed no call back form 1700 Asaf Silva.  Stated will inquire regarding additional phone numbers. Patient less agitated. RN to continue to [facility]. 1725: Informed by nutrition services that sitter was assisting patient off bathroom floor. RN in room to assess patient. Patient already off floor and on toilet when RN came into room. Per sitter, patient dropped tele monitoring box and bent down to  box, slipped, and landed onto her butt. Per sitter, no other areas were hit, including head. Non-skid footwear placed on patient earlier (changed last around 1030) was not on the patient at this time and RN found the non-skid footwear under the covers of the patient's bed. Patient stated she took them off in bed. Patient assisted back into bed via wheelchair due to tremors. /75, HR 85, 93% oxygen saturation. Haley Tinsley MD, informed. No new orders at this time. Attempted to inform family. No answer. Message left requesting callback. Sitter reminded to ensure non-skid footwear is worn and gait belt used. Suggested use of bedside commode instead of bathroom. The patient  does not have a history of falls. I did complete a risk assessment. 1930: Bedside shift change report given to Maura Beltran RN, (oncoming nurse) by Claude Hdez RN, (offgoing nurse). Report included the following information SBAR, Kardex, Intake/Output, MAR, Accordion, Recent Results and Cardiac Rhythm NSR.     1940:  called back regarding message. Informed  of patient's fall and gave update. Problem: Pain  Goal: *Control of Pain  Outcome: Progressing Towards Goal  Goal: *PALLIATIVE CARE:  Alleviation of Pain  Outcome: Progressing Towards Goal     Problem: Pressure Injury - Risk of  Goal: *Prevention of pressure injury  Description: Document Dennis Scale and appropriate interventions in the flowsheet.   Outcome: Progressing Towards Goal  Note: Pressure Injury Interventions:  Sensory Interventions: Maintain/enhance activity level, Minimize linen layers, Assess changes in LOC    Moisture Interventions: Minimize layers    Activity Interventions: Pressure redistribution bed/mattress(bed type)         Nutrition Interventions: Document food/fluid/supplement intake    Friction and Shear Interventions: Lift sheet                Problem: Alcohol Withdrawal  Goal: *STG: Remains safe in hospital  Outcome: Progressing Towards Goal

## 2020-06-15 NOTE — PROGRESS NOTES
Hospitalist Progress Note  Whitney Bryant MD  Answering service: [phone] or [phone] from in house phone      Date of Service:  6/15/2020  NAME:  Danyelle Solano  :  1950  MRN:  [id number]    Admission Summary:   Danyelle Solano is a 81 y.o. female who presents with whole body ache and chills      This is 81  female with past medical history notable for pneumonia,  tobacco abuse, chronic neck pain, breast cancer status post lumpectomy and radiation in , anemia, anxiety [facility] and pains, nausea, and persistent chest pain, chills  and shortness of breath.  She was seen in ER about a week ago, for dry cough, sob, headache and she had covid 19 test at that time was negative and she was given azithromycin and completed the course for a possible right lower lobe pneumonia on chest x-ray. No fever documented, pt but has intermittent chills at home then increasing whole body ache last several days, pt has mild dry cough,  noted that her pulse oximetry at home was 88-93% on room air after she had walked back to her bed from the bathroom.  No diagnose of asthma or COPD, no hx structural heart disease.  No urinary complaints.  Was hospitalized in February for pneumonia and had a reassuring echocardiogram at the time.   pain, cough, congestion, recent illness, palpitations, or dysuria. Pt lives home with . She is a retired icu nurse. Interval history / Subjective:   F/u Fever/bodyaches  Leukocytosis resolved  Creatinine improved  Wants to leave  Seems very anxious     Assessment & Plan:     Alcohol use/withdrawal  -CIWA  -Continue Banana bag  -The patient wants to leave the hospital but don't think that for now the patient has the mental capacity to take care of her health decisions. She would be a huge risk of a fall.  Spoke to the  and he agrees  -Monitor    Fever/N/V/Body aches/transaminitis  - flu -ve, COVID -ve x2   -Blood culture 6/12 no growth so far  -Hep panel negative  -Follow Rickettsial ab  -US abd 6/13 Distended gallbladder with sludge and dilated CBD, compatible with biliary dysfunction  Incidental left pleural transudate  -On empiric Unasyn but stopped 6/14. Doxy added by ID 6/13  -Transaminitis improving  -Appreciate ID    SOB/COPD  -mild/imaging unremarkable, wean off NC O2 as able. -outpatient PFTs    SHIVA  -probably ATN from contrast nephropathy/hypotension  -IV fluids  -now resolved    Weakness both hands  -With my examination could not really appreciate any focal weakness or any sensory loss  -? MRI, would hold for now as I think most likely it is sec to anxiety    Anxiety disorder  -chronic, but seems very anxious   -Awaiting psych    Hyponatremia: mild, monitor      Smoking  -nicotine patch  -Counseled    Regular diet    Code status: Full  DVT prophylaxis: Lovenox  PTA: home  Baseline: independent  Spoke to the     Plan: Follow ID    Care Plan discussed with: Patient/Family and Nurse  Disposition: TBD     Hospital Problems  Date Reviewed: 6/14/2020          Codes Class Noted POA    * (Principal) SOB (shortness of breath) ICD-10-CM: R06.02  ICD-9-CM: 786.05  6/12/2020 Yes            Review of Systems:   Pertinent items are mentioned in interval history. Vital Signs:    Last 24hrs VS reviewed since prior progress note.  Most recent are:  Visit Vitals  /71 (BP 1 Location: Right arm, BP Patient Position: At rest)   Pulse 88   Temp 98.2 °F (36.8 °C)   Resp 19   Ht 5' 2\" (1.575 m)   Wt 59.1 kg (130 lb 4.7 oz)   SpO2 93%   Breastfeeding No   BMI 23.83 kg/m²         Intake/Output Summary (Last 24 hours) at 6/15/2020 1156  Last data filed at 6/15/2020 0253  Gross per 24 hour   Intake 2628.42 ml   Output    Net 2628.42 ml        Physical Examination:   General:  Alert, oriented, No acute distress  Resp:  No accessory muscle use, Good AE, no wheezes, no rhonchi  Abd:  Soft, non-tender, non-distended  Extremities:  No cyanosis or clubbing, no significant edema  Neuro:  Grossly normal, no focal neuro deficits, follows commands   Psych:  Good insight, AAOx3, not agitated. Data Review:    Review and/or order of clinical lab test  Review and/or order of tests in the radiology section of [facility]  Review and/or order of tests in the medicine section of [facility]  Labs:     Recent Labs     06/15/20  0305 06/14/20  0157   WBC 10.7 15.8*   HGB 8.5* 8.8*   HCT 26.9* 27.9*    211     Recent Labs     06/15/20  0305 06/14/20  0157 06/13/20  0028 06/12/20  1424   * 131* 130* 138   K 3.8 4.4 3.7 3.3*    98 99 101   CO2 25 23 25 31   BUN 28* 33* 15 19   CREA 0.87 1.89* 0.79 1.01   GLU 90 78 139* 146*   CA 8.6 7.6* 7.6* 8.4*   MG  --   --   --  2.0     Recent Labs     06/15/20  0305 06/14/20  0157 06/13/20  0028   * 181* 270*   * 133* 149*   TBILI 0.5 0.6 0.5   TP 6.1* 5.8* 6.2*   ALB 2.3* 2.2* 2.3*   GLOB 3.8 3.6 3.9     Recent Labs     06/13/20  0027 06/12/20  1424   INR 1.1 1.1   PTP 11.5* 10.9   APTT 28.4  --       Recent Labs     06/15/20  0305 06/13/20  0029   TIBC 201*  --    PSAT 40  --    FERR  --  437*      Lab Results   Component Value Date/Time    Folate 27.4 (H) 06/15/2020 03:05 AM      No results for input(s): PH, PCO2, PO2 in the last 72 hours.   Recent Labs     06/14/20 0157 06/12/20  1424   *  --    TROIQ  --  <0.05     Lab Results   Component Value Date/Time    Cholesterol, total 132 02/23/2020 05:29 AM    HDL Cholesterol 61 02/23/2020 05:29 AM    LDL, calculated 61.8 02/23/2020 05:29 AM    Triglyceride 46 02/23/2020 05:29 AM    CHOL/HDL Ratio 2.2 02/23/2020 05:29 AM     No results found for: Cj English  Lab Results   Component Value Date/Time    Color YELLOW/STRAW 06/12/2020 04:57 PM    Appearance CLEAR 06/12/2020 04:57 PM    Specific gravity >1.030 (H) 06/12/2020 04:57 PM    pH (UA) 5.5 06/12/2020 04:57 PM    Protein Negative 06/12/2020 04:57 PM    Glucose Negative 06/12/2020 04:57 PM    Ketone Negative 06/12/2020 04:57 PM    Bilirubin Negative 06/12/2020 04:57 PM    Urobilinogen 1.0 06/12/2020 04:57 PM    Nitrites Negative 06/12/2020 04:57 PM    Leukocyte Esterase Negative 06/12/2020 04:57 PM    Epithelial cells FEW 06/12/2020 04:57 PM    Bacteria Negative 06/12/2020 04:57 PM    WBC 0-4 06/12/2020 04:57 PM    RBC 5-10 06/12/2020 04:57 PM     Medications Reviewed:     Current Facility-Administered Medications   Medication Dose Route Frequency    LORazepam (ATIVAN) injection 1 mg  1 mg IntraVENous Q1H PRN    enoxaparin (LOVENOX) injection 40 mg  40 mg SubCUTAneous Q24H    0.9% sodium chloride infusion  100 mL/hr IntraVENous CONTINUOUS    clonazePAM (KlonoPIN) tablet 0.5 mg  0.5 mg Oral BID PRN    LORazepam (ATIVAN) injection 2 mg  2 mg IntraVENous Q1H PRN    0.9% sodium chloride 1,000 mL with mvi, adult no. 4 with vit K 10 mL, thiamine 038 mg, folic acid 1 mg infusion   IntraVENous Q24H    nicotine (NICODERM CQ) 21 mg/24 hr patch 1 Patch  1 Patch TransDERmal Q24H    lactobac ac& pc-s.therm-b.anim (ALEXA Q/RISAQUAD)  1 Cap Oral DAILY    methyl salicylate-menthol (BENGAY) 15-10 % cream   Topical Q6H PRN    doxycycline (VIBRAMYCIN) 100 mg in 0.9% sodium chloride (MBP/ADV) 100 mL  100 mg IntraVENous Q12H    acetaminophen (TYLENOL) tablet 650 mg  650 mg Oral Q8H PRN    sodium chloride (NS) flush 5-40 mL  5-40 mL IntraVENous Q8H    sodium chloride (NS) flush 5-40 mL  5-40 mL IntraVENous PRN    naloxone (NARCAN) injection 0.4 mg  0.4 mg IntraVENous PRN    ondansetron (ZOFRAN) injection 4 mg  4 mg IntraVENous Q4H PRN    LORazepam (ATIVAN) injection 1 mg  1 mg IntraVENous Q8H PRN    FLUoxetine (PROzac) capsule 40 mg  40 mg Oral DAILY    albuterol (PROVENTIL HFA, VENTOLIN HFA, PROAIR HFA) inhaler 1 Puff  1 Puff Inhalation Q6H PRN   ______________________________________________________________________  EXPECTED LENGTH OF STAY: - - -  ACTUAL LENGTH OF STAY:          [address], [place]

## 2020-06-15 NOTE — DISCHARGE INSTRUCTIONS
Smoking Cessation Program: This is a free, phone/text/email based, smoking cessation program. The program is individualized to meet each patient's needs. To enroll use the link - [email or web address] or text Christopher Hodge to [phone] from any smart phone.

## 2020-06-15 NOTE — CDMP QUERY
Pt admitted with SOB, body aches, and chills. Pt noted to have elevated WBC, tachycardia, tachypnea, elevated C-reactive protein, AMS. If possible, please document in the progress notes and discharge summary if you are evaluating and /or treating any of the following: 
? Sepsis, causative organicism (please specify) ? Sepsis, POA causative organism (please specify) ? Sepsis, now resolved, causative organism (please specify) ? SIRS (d/t infection) ? Non-infectious SIRS 
? Other, please specify ? Clinically unable to determine The medical record reflects the following: 
   Risk Factors: 68yo with previous admission of PNA, was seen in ER about a week ago for dry cough, SOB, HA, and negative COVID Clinical Indicators: WBC: 8.9-15.8, Lactic Acid: 1.5, Tachycardia: 102 x2, Tachypnea: 22 (6/15), C-reactive protein: 22.60, Procalcitonin: 0.37, AMS d/t alcohol withdrawal, elevated liver enzymes Treatment: doxycycline 100mg q12hr IV, unasyn 3g q6hr (no D/Yemi), zinc sulfate 1 cap x1, vitamin C tablet 1000mg x1, ID consult, ultrasound of RUQ, following LFTs Thank you, Akin Coley [phone]

## 2020-06-15 NOTE — PROGRESS NOTES
TRANSFER - OUT REPORT:    Verbal report given to Farhad Boswell RN on [employer]  being transferred to [facility] (unit) for change in patient condition(increased confusion, CIWA 22)       Report consisted of patients Situation, Background, Assessment and   Recommendations(SBAR). Information from the following report(s) SBAR, Kardex, [facility] and Recent Results was reviewed with the receiving nurse. Lines:   Peripheral IV 06/12/20 Left Antecubital (Active)   Site Assessment Clean, dry, & intact 6/14/2020  9:14 PM   Phlebitis Assessment 0 6/14/2020  9:14 PM   Infiltration Assessment 0 6/14/2020  9:14 PM   Dressing Status Clean, dry, & intact 6/14/2020  9:14 PM   Dressing Type Transparent 6/14/2020  9:14 PM   Hub Color/Line Status Pink;Flushed;Capped; Patent 6/14/2020  9:14 PM   Action Taken Open ports on tubing capped 6/14/2020  9:14 PM   Alcohol Cap Used Yes 6/14/2020  9:14 PM       Peripheral IV 06/14/20 Left Wrist (Active)   Site Assessment Clean, dry, & intact 6/14/2020  9:14 PM   Phlebitis Assessment 0 6/14/2020  9:14 PM   Infiltration Assessment 0 6/14/2020  9:14 PM   Dressing Status Clean, dry, & intact 6/14/2020  9:14 PM   Dressing Type Transparent;Tape 6/14/2020  9:14 PM   Hub Color/Line Status Blue; Infusing;Flushed;Patent 6/14/2020  9:14 PM   Action Taken Dressing changed; Open ports on tubing capped 6/14/2020  9:14 PM   Alcohol Cap Used Yes 6/14/2020  9:14 PM        Opportunity for questions and clarification was provided.       Patient transported with:   Monitor  Patient's medications from home  Registered Nurse

## 2020-06-15 NOTE — PROGRESS NOTES
Mary Jo NP Progress note    Name: Jessica Velazquez  YOB: 1950  MRN: [id number]  Admission Date: 6/12/2020  2:06 PM    Date of service: 6/15/2020 1:26 AM                                Overnight Update:        Complaint: Agitation, elevated CIWA  Paged by: Gomez Ames RN  Subjective: Patient placed on CIWA scale yesterday. Over the course of the evening has received two doses of ativan, last was at 2300. Now with CIWA score 22, agitation, hallucinations. RN requesting escalation of level of care, sitter.   Plan:   - Given high CIWAs and repeated doses of IV ativan, will move patient to remote telemetry for closer monitoring  - Requesting sitter due to danger to self, agreed, ordered  - CIWA ativan order adjusted for 1-2mg based on score     Param CLARKE-C, PAKasiaC  [phone] or Tiger[employer]

## 2020-06-15 NOTE — PROGRESS NOTES
CM spoke with bedside RN, patient on CIWA protocol- the patient is agitated, will defer assessment at this time. CM will follow-up with the patient's spouse, NOK, when appropriate.  LUCILA Blackwell

## 2020-06-15 NOTE — CDMP QUERY
Patient admitted with SOB, body aches, and chills. Noted documentation of \"SHIVA -probably ATN from contrast nephropathy/hypotension\" in hospitalist PN on 6/14 & 6/15. If possible, please document in progress notes and discharge summary: 
 
? ATN present as evidenced by: ___________ 
(please document in your next progress note) ? ATN was ruled out and amended documentation provided in the medical record  
? Other, please specify ? Unknown The medical record reflects the following:   
   Risk Factors: 68yo that received contrast for CT scan on 6/12 Clinical Indicators: Creatinine 0.87-1.89, one elevated creatine on 6/14 (1.89) Treatment: IV fluids, daily labs Thank you, Melissa Blackburn [phone]

## 2020-06-15 NOTE — TELEPHONE ENCOUNTER
Reason for call: Pt is advising that she is currently being admitted to [facility] for low oxygen from pneumonia.        Callback required yes/no and why: yes       Best contact number(s):[phone]       Details to clarify the request:       Martha Dekcer

## 2020-06-15 NOTE — PROGRESS NOTES
0115 Went into room to bed alarm going off. Patient was trying to get out of the bed. She has become more confused as she doesn't know where she is and thought that her  was in the bed beside her. She thinks that her friends are in the room with her and that they are on a trip. CIWA score of 22, hallucinations, tremors, and trying to leave. 0328 paged Pito Mendosa NP in regards to CIWA score of 22. Orders to go ahead and give the Ativan again. Transfer orders to send her to a telemetry unit for more frequent monitoring with a sitter if one is available.

## 2020-06-15 NOTE — PROGRESS NOTES
0220: TRANSFER - IN REPORT:    Verbal report received from Emelia(name) on Norman Wong  being received from [facility](unit) for routine progression of care      Report consisted of patients Situation, Background, Assessment and   Recommendations(SBAR). Information from the following report(s) SBAR, Kardex, Procedure Summary, MAR, and Recent Results was reviewed with the receiving nurse. Opportunity for questions and clarification was provided. Assessment completed upon patients arrival to unit and care assumed. 0730: Bedside shift change report given to [facility] (oncoming nurse). Report included the following information SBAR, Kardex, Procedure Summary, Intake/Output, MAR, Recent Results and Cardiac Rhythm NSR.       -------------------  Care Plan 0175  Problem: Falls - Risk of  Goal: *Absence of Falls  Description: Document Manny Irizarry Fall Risk and appropriate interventions in the flowsheet. Outcome: Progressing Towards Goal  Note: Fall Risk Interventions:  Mobility Interventions: Bed/chair exit alarm, Communicate number of staff needed for ambulation/transfer, Patient to call before getting OOB    Mentation Interventions: Adequate sleep, hydration, pain control, Bed/chair exit alarm, Door open when patient unattended, Increase mobility, More frequent rounding, Reorient patient, Room close to nurse's station, Toileting rounds, Update white board    Medication Interventions: Bed/chair exit alarm, Evaluate medications/consider consulting pharmacy, Patient to call before getting OOB, Teach patient to arise slowly    Elimination Interventions: Bed/chair exit alarm, Call light in reach, Patient to call for help with toileting needs, Stay With Me (per policy), Toilet paper/wipes in reach, Toileting schedule/hourly rounds              Problem: Pressure Injury - Risk of  Goal: *Prevention of pressure injury  Description: Document Dennis Scale and appropriate interventions in the flowsheet.   Outcome: Progressing Towards Goal  Note: Pressure Injury Interventions:  Sensory Interventions: Assess changes in LOC, Discuss PT/OT consult with provider, Minimize linen layers    Moisture Interventions: Minimize layers    Activity Interventions: Increase time out of bed, Pressure redistribution bed/mattress(bed type), PT/OT evaluation         Nutrition Interventions: Document food/fluid/supplement intake    Friction and Shear Interventions: Lift sheet

## 2020-06-15 NOTE — CARDIO/PULMONARY
Cardiac Rehab: Per EMR, patient is a current smoker.  Smoking Cessation Program information placed on the AVS.   
Kristen Henning RN

## 2020-06-16 ENCOUNTER — HOME HEALTH ADMISSION (OUTPATIENT)
Dept: HOME HEALTH SERVICES | Facility: HOME HEALTH | Age: 70
End: 2020-06-16
Payer: MEDICARE

## 2020-06-16 VITALS
WEIGHT: 129.19 LBS | TEMPERATURE: 97.8 F | HEIGHT: 62 IN | DIASTOLIC BLOOD PRESSURE: 86 MMHG | SYSTOLIC BLOOD PRESSURE: 186 MMHG | OXYGEN SATURATION: 100 % | HEART RATE: 78 BPM | RESPIRATION RATE: 18 BRPM | BODY MASS INDEX: 23.77 KG/M2

## 2020-06-16 LAB
ALBUMIN SERPL-MCNC: 2.2 G/DL (ref 3.5–5)
ALBUMIN/GLOB SERPL: 0.6 {RATIO} (ref 1.1–2.2)
ALP SERPL-CCNC: 128 U/L (ref 45–117)
ALT SERPL-CCNC: 118 U/L (ref 12–78)
ANION GAP SERPL CALC-SCNC: 8 MMOL/L (ref 5–15)
AST SERPL-CCNC: 61 U/L (ref 15–37)
BASOPHILS # BLD: 0.1 K/UL (ref 0–0.1)
BASOPHILS NFR BLD: 1 % (ref 0–1)
BILIRUB SERPL-MCNC: 0.5 MG/DL (ref 0.2–1)
BUN SERPL-MCNC: 14 MG/DL (ref 6–20)
BUN/CREAT SERPL: 25 (ref 12–20)
CALCIUM SERPL-MCNC: 8.2 MG/DL (ref 8.5–10.1)
CHLORIDE SERPL-SCNC: 105 MMOL/L (ref 97–108)
CO2 SERPL-SCNC: 23 MMOL/L (ref 21–32)
CREAT SERPL-MCNC: 0.55 MG/DL (ref 0.55–1.02)
DIFFERENTIAL METHOD BLD: ABNORMAL
EOSINOPHIL # BLD: 0 K/UL (ref 0–0.4)
EOSINOPHIL NFR BLD: 0 % (ref 0–7)
ERYTHROCYTE [DISTWIDTH] IN BLOOD BY AUTOMATED COUNT: 14.1 % (ref 11.5–14.5)
GLOBULIN SER CALC-MCNC: 3.6 G/DL (ref 2–4)
GLUCOSE SERPL-MCNC: 74 MG/DL (ref 65–100)
HCT VFR BLD AUTO: 26.7 % (ref 35–47)
HGB BLD-MCNC: 8.3 G/DL (ref 11.5–16)
IMM GRANULOCYTES # BLD AUTO: 0 K/UL
IMM GRANULOCYTES NFR BLD AUTO: 0 %
LYMPHOCYTES # BLD: 6.1 K/UL (ref 0.8–3.5)
LYMPHOCYTES NFR BLD: 55 % (ref 12–49)
MCH RBC QN AUTO: 33.1 PG (ref 26–34)
MCHC RBC AUTO-ENTMCNC: 31.1 G/DL (ref 30–36.5)
MCV RBC AUTO: 106.4 FL (ref 80–99)
MONOCYTES # BLD: 1.1 K/UL (ref 0–1)
MONOCYTES NFR BLD: 10 % (ref 5–13)
NEUTS BAND NFR BLD MANUAL: 1 % (ref 0–6)
NEUTS SEG # BLD: 3.7 K/UL (ref 1.8–8)
NEUTS SEG NFR BLD: 33 % (ref 32–75)
NRBC # BLD: 0 K/UL (ref 0–0.01)
NRBC BLD-RTO: 0 PER 100 WBC
PLATELET # BLD AUTO: 249 K/UL (ref 150–400)
PLATELET COMMENTS,PCOM: ABNORMAL
PMV BLD AUTO: 11 FL (ref 8.9–12.9)
POTASSIUM SERPL-SCNC: 3.7 MMOL/L (ref 3.5–5.1)
PROT SERPL-MCNC: 5.8 G/DL (ref 6.4–8.2)
RBC # BLD AUTO: 2.51 M/UL (ref 3.8–5.2)
RBC MORPH BLD: ABNORMAL
SODIUM SERPL-SCNC: 136 MMOL/L (ref 136–145)
WBC # BLD AUTO: 11 K/UL (ref 3.6–11)

## 2020-06-16 PROCEDURE — 74011250637 HC RX REV CODE- 250/637: Performed by: HOSPITALIST

## 2020-06-16 PROCEDURE — 85025 COMPLETE CBC W/AUTO DIFF WBC: CPT

## 2020-06-16 PROCEDURE — 74011250637 HC RX REV CODE- 250/637: Performed by: PSYCHIATRY & NEUROLOGY

## 2020-06-16 PROCEDURE — 36415 COLL VENOUS BLD VENIPUNCTURE: CPT

## 2020-06-16 PROCEDURE — 77030012341 HC CHMB SPCR OPTC MDI VYRM -A

## 2020-06-16 PROCEDURE — 97116 GAIT TRAINING THERAPY: CPT

## 2020-06-16 PROCEDURE — 74011250636 HC RX REV CODE- 250/636: Performed by: INTERNAL MEDICINE

## 2020-06-16 PROCEDURE — 80053 COMPREHEN METABOLIC PANEL: CPT

## 2020-06-16 PROCEDURE — 74011250636 HC RX REV CODE- 250/636: Performed by: NURSE PRACTITIONER

## 2020-06-16 PROCEDURE — 94640 AIRWAY INHALATION TREATMENT: CPT

## 2020-06-16 PROCEDURE — 97161 PT EVAL LOW COMPLEX 20 MIN: CPT

## 2020-06-16 PROCEDURE — 74011000258 HC RX REV CODE- 258: Performed by: INTERNAL MEDICINE

## 2020-06-16 RX ORDER — FLUOXETINE HYDROCHLORIDE 20 MG/1
60 CAPSULE ORAL DAILY
Qty: 90 CAP | Refills: 0 | Status: SHIPPED | OUTPATIENT
Start: 2020-06-17

## 2020-06-16 RX ORDER — DOXYCYCLINE 100 MG/1
100 TABLET ORAL 2 TIMES DAILY
Qty: 6 TAB | Refills: 0 | Status: SHIPPED | OUTPATIENT
Start: 2020-06-16 | End: 2020-06-19

## 2020-06-16 RX ORDER — CLONAZEPAM 0.5 MG/1
0.5 TABLET ORAL 2 TIMES DAILY
Qty: 10 TAB | Refills: 0 | Status: SHIPPED | OUTPATIENT
Start: 2020-06-16 | End: 2021-02-03

## 2020-06-16 RX ORDER — FOLIC ACID 1 MG/1
1 TABLET ORAL DAILY
Status: DISCONTINUED | OUTPATIENT
Start: 2020-06-17 | End: 2020-06-16 | Stop reason: HOSPADM

## 2020-06-16 RX ORDER — BUSPIRONE HYDROCHLORIDE 5 MG/1
5 TABLET ORAL 3 TIMES DAILY
Qty: 90 TAB | Refills: 0 | Status: SHIPPED | OUTPATIENT
Start: 2020-06-16 | End: 2021-02-03 | Stop reason: ALTCHOICE

## 2020-06-16 RX ORDER — BUSPIRONE HYDROCHLORIDE 5 MG/1
5 TABLET ORAL 3 TIMES DAILY
Status: DISCONTINUED | OUTPATIENT
Start: 2020-06-16 | End: 2020-06-16 | Stop reason: HOSPADM

## 2020-06-16 RX ORDER — FLUOXETINE HYDROCHLORIDE 20 MG/1
20 CAPSULE ORAL
Status: COMPLETED | OUTPATIENT
Start: 2020-06-16 | End: 2020-06-16

## 2020-06-16 RX ORDER — LANOLIN ALCOHOL/MO/W.PET/CERES
100 CREAM (GRAM) TOPICAL DAILY
Status: DISCONTINUED | OUTPATIENT
Start: 2020-06-17 | End: 2020-06-16 | Stop reason: HOSPADM

## 2020-06-16 RX ORDER — DOXYCYCLINE HYCLATE 100 MG
100 TABLET ORAL EVERY 12 HOURS
Status: DISCONTINUED | OUTPATIENT
Start: 2020-06-16 | End: 2020-06-16 | Stop reason: HOSPADM

## 2020-06-16 RX ORDER — THERA TABS 400 MCG
1 TAB ORAL DAILY
Status: DISCONTINUED | OUTPATIENT
Start: 2020-06-17 | End: 2020-06-16 | Stop reason: HOSPADM

## 2020-06-16 RX ORDER — FLUOXETINE HYDROCHLORIDE 20 MG/1
60 CAPSULE ORAL DAILY
Status: DISCONTINUED | OUTPATIENT
Start: 2020-06-17 | End: 2020-06-16 | Stop reason: HOSPADM

## 2020-06-16 RX ORDER — IBUPROFEN 200 MG
1 TABLET ORAL EVERY 24 HOURS
Qty: 30 PATCH | Refills: 0 | Status: SHIPPED | OUTPATIENT
Start: 2020-06-16 | End: 2020-07-16

## 2020-06-16 RX ORDER — THERA TABS 400 MCG
1 TAB ORAL DAILY
Qty: 30 TAB | Refills: 2 | Status: SHIPPED | OUTPATIENT
Start: 2020-06-17

## 2020-06-16 RX ORDER — CLONAZEPAM 0.5 MG/1
0.5 TABLET ORAL 2 TIMES DAILY
Status: DISCONTINUED | OUTPATIENT
Start: 2020-06-16 | End: 2020-06-16 | Stop reason: HOSPADM

## 2020-06-16 RX ORDER — FOLIC ACID 1 MG/1
1 TABLET ORAL DAILY
Qty: 30 TAB | Refills: 2 | Status: SHIPPED | OUTPATIENT
Start: 2020-06-17 | End: 2021-02-03

## 2020-06-16 RX ORDER — LANOLIN ALCOHOL/MO/W.PET/CERES
100 CREAM (GRAM) TOPICAL DAILY
Qty: 30 TAB | Refills: 2 | Status: SHIPPED | OUTPATIENT
Start: 2020-06-17 | End: 2021-02-03

## 2020-06-16 RX ORDER — LORAZEPAM 1 MG/1
1 TABLET ORAL
Status: DISCONTINUED | OUTPATIENT
Start: 2020-06-16 | End: 2020-06-16 | Stop reason: HOSPADM

## 2020-06-16 RX ADMIN — LORAZEPAM 1 MG: 2 INJECTION INTRAMUSCULAR; INTRAVENOUS at 02:38

## 2020-06-16 RX ADMIN — BUSPIRONE HYDROCHLORIDE 5 MG: 5 TABLET ORAL at 15:14

## 2020-06-16 RX ADMIN — DOXYCYCLINE 100 MG: 100 INJECTION, POWDER, LYOPHILIZED, FOR SOLUTION INTRAVENOUS at 09:49

## 2020-06-16 RX ADMIN — Medication 10 ML: at 05:46

## 2020-06-16 RX ADMIN — CLONAZEPAM 0.5 MG: 1 TABLET ORAL at 08:31

## 2020-06-16 RX ADMIN — ALBUTEROL SULFATE 1 PUFF: 90 AEROSOL, METERED RESPIRATORY (INHALATION) at 04:44

## 2020-06-16 RX ADMIN — CLONAZEPAM 0.5 MG: 0.5 TABLET ORAL at 16:14

## 2020-06-16 RX ADMIN — FLUOXETINE 20 MG: 20 CAPSULE ORAL at 11:55

## 2020-06-16 RX ADMIN — Medication 1 CAPSULE: at 08:21

## 2020-06-16 RX ADMIN — FLUOXETINE 40 MG: 20 CAPSULE ORAL at 08:21

## 2020-06-16 NOTE — PROGRESS NOTES
Hospital follow-up PCP transitional care appointment has been scheduled with Dr. Samantha Botello for Tuesday, 6/23/20 at 11:00 a.m. Pending patient discharge.   Bob Morrison, Care Management Specialist.

## 2020-06-16 NOTE — CONSULTS
[address]    Name:  Jeovanny Mercado  MR#:  [id number]  :  1950  ACCOUNT #:  [de-identified]  DATE OF SERVICE:    CHIEF COMPLAINT:  \"I feel broken down. \"    HISTORY OF PRESENT ILLNESS:  The patient is a 72-year-old  female who is currently admitted to the hospital after she presented to the ER requesting help for body aches, nausea, chest pain, shortness of breath. She tested negative for COVID-19 twice and has been treated with antibiotics for pneumonia and is physically recovering. There are still concerns about her ability to live independently. She has been reporting anxiety and has been demanding to be released. When I spoke to the patient, she reports that she has been feeling more depressed recently and feels her medications including Prozac are not really helping her. Notes that she was having trouble sleeping at home but sleeps well in the hospital.  States that she starts crying whenever she thinks about the circumstances of people that have  from COVID-19 or who have struggled financially because of the pandemic. She cried several times during the interview. Reports that there has been some conflict with her , whom she considers to be a domineering and inflexible person. Feels that he does not give enough space to her, desires and tends to do whatever he wants to do including making her medical decisions. She denies any suicidal ideation or plan. Denies any perceptual abnormalities. States that she feels uncomfortable physically and emotionally in the hospital and just wants to go home but agrees to stay in the hospital to continue her treatment after we discussed the risks of falling and further worsening her health. Denies regular use of alcohol or drugs. She has been taking clonazepam occasionally to help with anxiety. Denies any psychotic symptoms to me.     PAST MEDICAL HISTORY:  Reviewed as per the history and physical exam.      Past Medical History:   Diagnosis Date    Anemia     Anxiety     Breast cancer ([facility] [address] 2005    right lumpectomy    Chronic pain     neck pain    Cough     Depression     Hemorrhoid     Menopause 2005    Muscle pain     Neck problem     Pneumonia     sepis    S/P radiation therapy 2005    Sleep trouble     Thyroid disease     Urine troubles     unable to controll     Prior to Admission medications    Medication Sig Start Date End Date Taking? Authorizing Provider   clonazePAM (KlonoPIN) 0.5 mg tablet Take 1 Tab by mouth two (2) times a day. Max Daily Amount: 1 mg. 6/16/20  Yes Angelene Lennox, MD   FLUoxetine (PROzac) 20 mg capsule Take 3 Caps by mouth daily. 6/17/20  Yes Angelene Lennox, MD   busPIRone (BUSPAR) 5 mg tablet Take 1 Tab by mouth three (3) times daily. 6/16/20  Yes Angelene Lennox, MD   thiamine HCL (B-1) 100 mg tablet Take 1 Tab by mouth daily. 6/17/20  Yes Angelene Lennox, MD   therapeutic multivitamin [facility]) tablet Take 1 Tab by mouth daily. 6/17/20  Yes Angelene Lennox, MD   nicotine (NICODERM CQ) 21 mg/24 hr 1 Patch by TransDERmal route every twenty-four (24) hours for 30 days. 6/16/20 7/16/20 Yes Angelene Lennox, MD   folic acid (FOLVITE) 1 mg tablet Take 1 Tab by mouth daily. 6/17/20  Yes Angelene Lennox, MD   QUEtiapine (SEROqueL) 25 mg tablet Take 25-50 mg by mouth nightly.  For sleep   Yes Provider, Historical   levothyroxine (SYNTHROID) 100 mcg tablet TAKE 1 TABLET EVERY DAY BEFORE BREAKFAST 1/8/20  Yes Kelli Engle MD   clonazePAM (KLONOPIN) 0.5 mg tablet TAKE 1-2 TABLETS EACH DAY AS NEEDED FOR ANXIETY 9/17/19  Yes Provider, Historical   dextroamphetamine (DEXTROSTAT) 10 mg tab TAKE 1 TABLET BY MOUTH TWICE A DAY 9/28/19  Yes Provider, Historical   traZODone (DESYREL) 150 mg tablet TAKE 1 TABLET EVERY NIGHT 9/26/19  Yes Kelli Engle MD   FLUoxetine (PROzac) 40 mg capsule TAKE 1 CAPSULE BY MOUTH EVERY DAY 3/16/20   Other, MD Lulu   albuterol (PROVENTIL HFA, VENTOLIN HFA, PROAIR HFA) 90 mcg/actuation inhaler INHALE 1 2 PUFFS BY MOUTH EVERY 6 HOURS AS NEEDED FOR WHEEZING 2/29/20   Gio, MD Lulu   albuterol sulfate 90 mcg/actuation aepb Take 1-2 Puffs by inhalation every six (6) hours as needed for Wheezing. 2/28/20   AVERY Simons MD   permethrin (ACTICIN) 5 % topical cream permethrin 5 % topical cream   APPLY (THOROUGHLY MASSAGE INTO SKIN FROM HEAD TO SOLES OF FEET) BY TOPICAL ROUTE ONCE LEAVE ON FOR 8-14 HR, THEN REMOVE BY THOROUGH WASHING    Provider, Historical   VIIBRYD 40 mg tab tablet TAKE 1 TABLET BY MOUTH EVERY DAY 9/26/19   Provider, Historical   LORazepam (ATIVAN) 1 mg tablet TAKE 1 TABLET BY MOUTH TWICE A DAY AS NEEDED  Patient taking differently: 0.5-1 mg. For help sleeping 5/1/19   Tiki Garcia MD   ARIPiprazole (ABILIFY) 5 mg tablet  3/12/19   Provider, Historical   sertraline (ZOLOFT) 100 mg tablet TAKE  1 1/2 TABLET BY MOUTH EVERY DAY 3/1/19   Provider, Historical   promethazine (PHENERGAN) 25 mg tablet Take 1/2-1 tablet every 6 hours as needed for nausea. 3/13/19   Tiki Garcia MD   ondansetron (ZOFRAN ODT) 4 mg disintegrating tablet Take 1 Tab by mouth every eight (8) hours as needed for Nausea. 3/13/19   Tiki Garcia MD   colestipol (COLESTID) 1 gram tablet Take 1 Tab by mouth two (2) times a day. 3/13/19   Tiki Garcia MD   Bacillus coagulans (PROBIOTIC, B. COAGULANS, PO) Take  by mouth. Provider, Historical   naproxen sodium (ALEVE) 220 mg cap Take  by mouth.     Provider, Historical     Vitals:    06/16/20 0444 06/16/20 0700 06/16/20 1119 06/16/20 1539   BP:  143/57 179/73 186/86   Pulse: 82 87 79 78   Resp: 18 18 16 18   Temp:  98 °F (36.7 °C) 97.9 °F (36.6 °C) 97.8 °F (36.6 °C)   SpO2:  96% 98%    Weight:       Height:         Lab Results   Component Value Date/Time    WBC 11.0 06/16/2020 03:20 AM    HGB 8.3 (L) 06/16/2020 03:20 AM    HCT 26.7 (L) 06/16/2020 03:20 AM    PLATELET 180 53/71/4853 03:20 AM    .4 (H) 06/16/2020 03:20 AM Lab Results   Component Value Date/Time    Sodium 136 06/16/2020 03:16 AM    Potassium 3.7 06/16/2020 03:16 AM    Chloride 105 06/16/2020 03:16 AM    CO2 23 06/16/2020 03:16 AM    Anion gap 8 06/16/2020 03:16 AM    Glucose 74 06/16/2020 03:16 AM    BUN 14 06/16/2020 03:16 AM    Creatinine 0.55 06/16/2020 03:16 AM    BUN/Creatinine ratio 25 (H) 06/16/2020 03:16 AM    GFR est AA >60 06/16/2020 03:16 AM    GFR est non-AA >60 06/16/2020 03:16 AM    Calcium 8.2 (L) 06/16/2020 03:16 AM    Bilirubin, total 0.5 06/16/2020 03:16 AM    Alk. phosphatase 128 (H) 06/16/2020 03:16 AM    Protein, total 5.8 (L) 06/16/2020 03:16 AM    Albumin 2.2 (L) 06/16/2020 03:16 AM    Globulin 3.6 06/16/2020 03:16 AM    A-G Ratio 0.6 (L) 06/16/2020 03:16 AM    ALT (SGPT) 118 (H) 06/16/2020 03:16 AM     No results found for: VALF2, VALAC, VALP, VALPR, DS6, CRBAM, CRBAMP, CARB2, XCRBAM  No results found for: LITHM  RADIOLOGY REPORTS:(reviewed/updated 6/16/2020)  Cta Chest W Or W Wo Cont    Result Date: 6/12/2020  INDICATION: SOB, rule out PE COMPARISON: Earlier portable chest x-ray, CT angiogram 2/27/2020 EXAM: Precontrast localizer was first performed to verify the levels of the pulmonary arteries. Subsequently, contiguous axial images were obtained after the rapid IV bolus administration of 80 cc Isovue-370, followed by thin section axial reconstructions with multiplanar reformations. Three-dimensional post - processing was performed. CT dose reduction was achieved through use of a standardized protocol tailored for this examination and automatic exposure control for dose modulation. FINDINGS: No pulmonary embolism is demonstrated. There are small bilateral pleural effusions. Cardiac size is normal. No mediastinal or hilar mass or adenopathy is shown. There are small bilateral axillary lymph nodes, more numerous on the left, with short axis dimension measuring up to 12 mm. Bilateral breast implants are demonstrated.  Patchy atelectasis is shown at the posterior costophrenic sulci bilaterally, greater on the left. Linear scarring versus atelectasis is noted in the inferior lingula. Mild thoracic spine degenerative changes are shown. IMPRESSION: 1. No evidence for pulmonary embolism. 2. Small bilateral pleural effusions. Us Abd Comp    Result Date: 6/13/2020  EXAM: US ABD COMP INDICATION: Abnormal liver function tests. . COMPARISON: None. TECHNIQUE: Real-time sonography of the abdomen was performed with multiple static images of the liver, gallbladder, pancreas, spleen, kidneys and retroperitoneum obtained. FINDINGS: LIVER: The liver is normal in echotexture with no mass or other focal abnormality. LIVER VASCULATURE: The portal vein flow is hepatopedal. GALLBLADDER: The gallbladder is distended with some sludge. There is no wall thickening or fluid around the gallbladder. The gallbladder measures 99.5 mL. COMMON BILE DUCT: There is no intrahepatic biliary duct dilatation and the common duct measures 10 mm in diameter. PANCREAS: The visualized pancreas is normal. SPLEEN: The spleen is normal in echotexture and size and measures 9 cm in length. The spleen measures 155 mL. RIGHT KIDNEY: The right kidney demonstrates normal echogenicity with no mass, stone or hydronephrosis. The right kidney measures 10.3 cm in length. LEFT KIDNEY: The left kidney demonstrates normal echogenicity with no mass, stone or hydronephrosis. The left kidney measures 9.8 cm in length. RETROPERITONEUM: The aorta tapers normally. The IVC is normal. Incidental note is made of a small left pleural effusion. IMPRESSION: Distended gallbladder with sludge and dilated CBD, compatible with biliary dysfunction Incidental left pleural transudate     Xr Chest Port    Result Date: 6/12/2020  EXAM: XR CHEST PORT INDICATION: Chest pain COMPARISON: 6/6/2020 FINDINGS: A portable AP radiograph of the chest was obtained at 1429 hours. There is no pneumothorax or pleural effusion.  The lungs are clear. Cardiac, mediastinal and hilar contours are normal. There is unchanged mild aortic arch atherosclerotic calcification. The bones are mildly osteopenic. IMPRESSION: No acute findings. Xr Chest Port    Result Date: 6/6/2020  INDICATION:  dyspnea, cough EXAM: Chest single view. COMPARISON: 2/22/2020, 9/25/2015. FINDINGS: A single frontal view of the chest at 09 50 hours shows incomplete inspiratory effort with bibasilar atelectasis and patchy focal opacity in the right lung base where there may be developing early or mild infiltrate. .  The heart, mediastinum and pulmonary vasculature are stable . The bony thorax is unremarkable for age, except for stable healed left rib fracture. . . IMPRESSION: Patchy right lower lobe opacity where there may be developing early or mild infiltrate. Correlate clinically. 2 view chest radiograph may be helpful when clinically possible. .  . PAST PSYCHIATRIC HISTORY:  The patient reports that she has been in treatment for psychiatric issues for almost 20 years but has never been hospitalized in an inpatient psychiatric unit or attempted suicide. Most recently, she has been seeing a nurse practitioner, Ms. Luzma Hernandez, and has tried multiple antidepressants including several SSRIs, SNRIs and augmenting agents. Denies any prior history of substance abuse. PSYCHOSOCIAL HISTORY:  The patient currently lives in [place], [place], where she lives with her  of 40 years. They have two children including one son who lives in [facility] and another one who lives in [facility]. States that they keep in regular contact with both of the parents. 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 Reports that her  is mostly supportive except for the issues mentioned above and they have had a supportive relationship. Denies any major legal or financial stressors.     PHYSICAL EXAMINATION:  The patient is a middle-aged  female who is dressed in hospital apparel. She is lying in bed and was cooperative during the interview. Her affect is depressed and she cried several times during the interview. Mood is reported as low. Speech is spontaneous and coherent. Psychomotor activity is within normal limits. Denies any active suicidal ideation or plan. Denies any perceptual abnormalities. Denies any delusions. Her thought process is logical and goal-directed. Cognitively, she is awake and alert, oriented to time, place and person. A full cognitive exam was not done. Insight is partial.  Judgment is fair. DIAGNOSTIC IMPRESSION:  Recurrent major depression, severe without psychotic symptoms, generalized anxiety disorder. At the present time, the patient does not appear to pose a threat to herself from a psychiatric perspective. She has been demanding to be discharged or agrees to continue her stay in the hospital after discussion of the benefits. I will increase her dosage of Prozac to 60 mg for maximal benefit and I have also augmented her regimen with BuSpar 5 mg t.i.d.  Klonopin was also changed to a standing dosage twice a day for 5 days after which she can resume p.r.n. use. At the present time, the patient does not meet criteria for inpatient psychiatric hospitalization. Discussed with Dr. Екатерина Wright on the phone also. Please feel free to reconsult as needed. Thank you for your consult.       MD FERNANDO [email or web address]  D:  06/16/2020 12:18  T:  06/16/2020 13:19  JOB #:  [id number]

## 2020-06-16 NOTE — PROGRESS NOTES
ID Progress Note  2020    Subjective:     Afebrile. No complaints at present. Objective:     Vitals:   Visit Vitals  /86 (BP 1 Location: Right arm, BP Patient Position: Sitting)   Pulse 78   Temp 97.8 °F (36.6 °C)   Resp 18   Ht 5' 2\" (1.575 m)   Wt 58.6 kg (129 lb 3 oz)   SpO2 98%   Breastfeeding No   BMI 23.63 kg/m²        Tmax:  Temp (24hrs), Av.2 °F (36.8 °C), Min:97.8 °F (36.6 °C), Max:99 °F (37.2 °C)      Exam:    Not in distress    Lungs: clear to auscultation, no rales wheezes or rhonchi  Heart: s1, s2, no murmurs, rubs or clicks  Abdomen: soft, nontender, no guarding or rebound. No Rodriguez sign      Labs:   Lab Results   Component Value Date/Time    WBC 11.0 2020 03:20 AM    HGB 8.3 (L) 2020 03:20 AM    HCT 26.7 (L) 2020 03:20 AM    PLATELET 779  03:20 AM    .4 (H) 2020 03:20 AM     Lab Results   Component Value Date/Time    Sodium 136 2020 03:16 AM    Potassium 3.7 2020 03:16 AM    Chloride 105 2020 03:16 AM    CO2 23 2020 03:16 AM    Anion gap 8 2020 03:16 AM    Glucose 74 2020 03:16 AM    BUN 14 2020 03:16 AM    Creatinine 0.55 2020 03:16 AM    BUN/Creatinine ratio 25 (H) 2020 03:16 AM    GFR est AA >60 2020 03:16 AM    GFR est non-AA >60 2020 03:16 AM    Calcium 8.2 (L) 2020 03:16 AM    Bilirubin, total 0.5 2020 03:16 AM    Alk. phosphatase 128 (H) 2020 03:16 AM    Protein, total 5.8 (L) 2020 03:16 AM    Albumin 2.2 (L) 2020 03:16 AM    Globulin 3.6 2020 03:16 AM    A-G Ratio 0.6 (L) 2020 03:16 AM    ALT (SGPT) 118 (H) 2020 03:16 AM     US - dilated cbd     Assessment:     #1 fever - resolved      #2 elevated liver enzymes - better      #3 myalgias     #4 History of breast cancer    #5 acute renal failure     #6 significant alcohol use        Recommendations:     1.  Rmsf, ehrlichiosis labs negativem but we don't have a test for the lone [place] tick. Since she got better on doxy, would give 3 more days of therapy of doxy 100 mg bid,     2. The patient admitted to me that she drinks a martini at least 5 nights a week.       Deja Witt MD

## 2020-06-16 NOTE — PROGRESS NOTES
Problem: Falls - Risk of  Goal: *Absence of Falls  Description: Document Bib Laguna Fall Risk and appropriate interventions in the flowsheet. Outcome: Progressing Towards Goal  Variance Other (add note)  Note: Fall Risk Interventions:  Mobility Interventions: Assess mobility with egress test, Bed/chair exit alarm, Communicate number of staff needed for ambulation/transfer    Mentation Interventions: Adequate sleep, hydration, pain control, Bed/chair exit alarm, Evaluate medications/consider consulting pharmacy, Family/sitter at bedside    Medication Interventions: Evaluate medications/consider consulting pharmacy    Elimination Interventions: Bed/chair exit alarm, Call light in reach, Patient to call for help with toileting needs    History of Falls Interventions: Bed/chair exit alarm, Evaluate medications/consider consulting pharmacy, Investigate reason for fall, Utilize gait belt for transfer/ambulation         Problem: Pain  Goal: *Control of Pain  Outcome: Progressing Towards Goal     Problem: General Infection Care Plan (Adult and Pediatric)  Goal: Improvement in signs and symptoms of infection  Outcome: Progressing Towards Goal     Problem: Pressure Injury - Risk of  Goal: *Prevention of pressure injury  Description: Document Dennis Scale and appropriate interventions in the flowsheet.   Outcome: Progressing Towards Goal  Note: Pressure Injury Interventions:  Sensory Interventions: Assess changes in LOC    Moisture Interventions: Minimize layers    Activity Interventions: Pressure redistribution bed/mattress(bed type), PT/OT evaluation    Mobility Interventions: Pressure redistribution bed/mattress (bed type), PT/OT evaluation    Nutrition Interventions: Document food/fluid/supplement intake    Friction and Shear Interventions: Minimize layers                Problem: Alcohol Withdrawal  Goal: *STG: Vital signs within defined limits  Outcome: Progressing Towards Goal    0340: Called resp for PRN albuterol tx for wheezing    0800: Bedside shift change report given to Larissa Leger (oncoming nurse) by Geneva Hermosillo (offgoing nurse). Report included the following information SBAR, Kardex, Intake/Output, MAR and Recent Results.

## 2020-06-16 NOTE — PROGRESS NOTES
Hospitalist Progress Note  Sho Reyes MD  Answering service: [phone] OR [phone] from in house phone      Date of Service:  2020  NAME:  Yuri Badillo  :  1950  MRN:  [id number]    Admission Summary:   Yuri Badillo is a 81 y.o. female who presents with whole body ache and chills      This is 81  female with past medical history notable for pneumonia,  tobacco abuse, chronic neck pain, breast cancer status post lumpectomy and radiation in , anemia, anxiety [facility] generalized body aches and pains, nausea, and persistent chest pain, chills  and shortness of breath.  She was seen in ER about a week ago, for dry cough, sob, headache and she had covid 19 test at that time was negative and she was given azithromycin and completed the course for a possible right lower lobe pneumonia on chest x-ray. No fever documented, pt but has intermittent chills at home then increasing whole body ache last several days, pt has mild dry cough,  noted that her pulse oximetry at home was 88-93% on room air after she had walked back to her bed from the bathroom.  No diagnose of asthma or COPD, no hx structural heart disease.  No urinary complaints.  Was hospitalized in February for pneumonia and had a reassuring echocardiogram at the time.   pain, cough, congestion, recent illness, palpitations, or dysuria. Pt lives home with . She is a retired icu nurse. Interval history / Subjective:   F/u Fever/bodyaches  Leukocytosis resolved  Creatinine improved  Wants to leave  Seems very anxious     Assessment & Plan:     Alcohol use/withdrawal  -CIWA  -Continue Banana bag  -The patient wants to leave the hospital but don't think that for now the patient has the mental capacity to take care of her health decisions. She would be a huge risk of a fall.  Spoke to the  and he agrees  -Monitor    Fever/N/V/Body aches/transaminitis  - flu -ve, COVID -ve x2   -Blood culture 6/12 no growth so far  -Hep panel negative  -Follow Rickettsial ab  -US abd 6/13 Distended gallbladder with sludge and dilated CBD, compatible with biliary dysfunction  Incidental left pleural transudate  -On empiric Unasyn but stopped 6/14. Doxy added by ID 6/13  -Transaminitis improving  -Appreciate ID    Non infectious SIRS  -now resovled    SOB/COPD  -mild/imaging unremarkable, wean off NC O2 as able. -outpatient PFTs    SHIVA  -probably ATN from contrast nephropathy/hypotension, ATN ruled out  -IV fluids  -now resolved    Weakness both hands (reported)  -With my examination could not really appreciate any focal weakness or any sensory loss  -? MRI, would hold for now as I think most likely it is sec to anxiety    Anxiety disorder  -chronic, but seems very anxious   -Awaiting psych    Hyponatremia: mild, monitor      Smoking  -nicotine patch  -Counseled    Regular diet    Code status: Full  DVT prophylaxis: Lovenox  PTA: home  Baseline: independent  Spoke to the     Plan: Follow pSych    Care Plan discussed with: Patient/Family and Nurse  Disposition: TBD     Hospital Problems  Date Reviewed: 6/14/2020          Codes Class Noted POA    * (Principal) SOB (shortness of breath) ICD-10-CM: R06.02  ICD-9-CM: 786.05  6/12/2020 Yes            Review of Systems:   Pertinent items are mentioned in interval history. Vital Signs:    Last 24hrs VS reviewed since prior progress note.  Most recent are:  Visit Vitals  /57 (BP 1 Location: Right arm, BP Patient Position: At rest)   Pulse 87   Temp 98 °F (36.7 °C)   Resp 18   Ht 5' 2\" (1.575 m)   Wt 58.6 kg (129 lb 3 oz)   SpO2 96%   Breastfeeding No   BMI 23.63 kg/m²         Intake/Output Summary (Last 24 hours) at 6/16/2020 0853  Last data filed at 6/16/2020 0321  Gross per 24 hour   Intake 120 ml   Output 0 ml   Net 120 ml        Physical Examination:   General:  Alert, oriented, No acute distress  Resp:  No accessory muscle use, Good AE, no wheezes, no rhonchi  Abd:  Soft, non-tender, non-distended  Extremities:  No cyanosis or clubbing, no significant edema  Neuro:  Grossly normal, no focal neuro deficits, follows commands   Psych:  Good insight, AAOx3, not agitated. Data Review:    Review and/or order of clinical lab test  Review and/or order of tests in the radiology section of CPT  Review and/or order of tests in the medicine section of [facility]  Labs:     Recent Labs     06/16/20  0320 06/15/20  0305   WBC 11.0 10.7   HGB 8.3* 8.5*   HCT 26.7* 26.9*    247     Recent Labs     06/16/20  0316 06/15/20  0305 06/14/20  0157    133* 131*   K 3.7 3.8 4.4    102 98   CO2 23 25 23   BUN 14 28* 33*   CREA 0.55 0.87 1.89*   GLU 74 90 78   CA 8.2* 8.6 7.6*     Recent Labs     06/16/20  0316 06/15/20  0305 06/14/20  0157   * 153* 181*   * 139* 133*   TBILI 0.5 0.5 0.6   TP 5.8* 6.1* 5.8*   ALB 2.2* 2.3* 2.2*   GLOB 3.6 3.8 3.6     No results for input(s): INR, PTP, APTT, INREXT, INREXT in the last 72 hours. Recent Labs     06/15/20  0305   TIBC 201*   PSAT 40      Lab Results   Component Value Date/Time    Folate 27.4 (H) 06/15/2020 03:05 AM      No results for input(s): PH, PCO2, PO2 in the last 72 hours.   Recent Labs     06/14/20 0157   *     Lab Results   Component Value Date/Time    Cholesterol, total 132 02/23/2020 05:29 AM    HDL Cholesterol 61 02/23/2020 05:29 AM    LDL, calculated 61.8 02/23/2020 05:29 AM    Triglyceride 46 02/23/2020 05:29 AM    CHOL/HDL Ratio 2.2 02/23/2020 05:29 AM     No results found for: [facility]  Lab Results   Component Value Date/Time    Color YELLOW/STRAW 06/12/2020 04:57 PM    Appearance CLEAR 06/12/2020 04:57 PM    Specific gravity >1.030 (H) 06/12/2020 04:57 PM    pH (UA) 5.5 06/12/2020 04:57 PM    Protein Negative 06/12/2020 04:57 PM    Glucose Negative 06/12/2020 04:57 PM    Ketone Negative 06/12/2020 04:57 PM    Bilirubin Negative 06/12/2020 04:57 PM    Urobilinogen 1.0 06/12/2020 04:57 PM    Nitrites Negative 06/12/2020 04:57 PM    Leukocyte Esterase Negative 06/12/2020 04:57 PM    Epithelial cells FEW 06/12/2020 04:57 PM    Bacteria Negative 06/12/2020 04:57 PM    WBC 0-4 06/12/2020 04:57 PM    RBC 5-10 06/12/2020 04:57 PM     Medications Reviewed:     Current Facility-Administered Medications   Medication Dose Route Frequency    LORazepam (ATIVAN) tablet 1 mg  1 mg Oral Q1H PRN    LORazepam (ATIVAN) injection 1 mg  1 mg IntraVENous Q1H PRN    enoxaparin (LOVENOX) injection 40 mg  40 mg SubCUTAneous Q24H    0.9% sodium chloride infusion  100 mL/hr IntraVENous CONTINUOUS    clonazePAM (KlonoPIN) tablet 0.5 mg  0.5 mg Oral BID PRN    LORazepam (ATIVAN) injection 2 mg  2 mg IntraVENous Q1H PRN    0.9% sodium chloride 1,000 mL with mvi, adult no. 4 with vit K 10 mL, thiamine 753 mg, folic acid 1 mg infusion   IntraVENous Q24H    nicotine (NICODERM CQ) 21 mg/24 hr patch 1 Patch  1 Patch TransDERmal Q24H    lactobac ac& pc-s.therm-b.anim (ALEXA Q/RISAQUAD)  1 Cap Oral DAILY    methyl salicylate-menthol (BENGAY) 15-10 % cream   Topical Q6H PRN    doxycycline (VIBRAMYCIN) 100 mg in 0.9% sodium chloride (MBP/ADV) 100 mL  100 mg IntraVENous Q12H    acetaminophen (TYLENOL) tablet 650 mg  650 mg Oral Q8H PRN    sodium chloride (NS) flush 5-40 mL  5-40 mL IntraVENous Q8H    sodium chloride (NS) flush 5-40 mL  5-40 mL IntraVENous PRN    naloxone (NARCAN) injection 0.4 mg  0.4 mg IntraVENous PRN    ondansetron (ZOFRAN) injection 4 mg  4 mg IntraVENous Q4H PRN    LORazepam (ATIVAN) injection 1 mg  1 mg IntraVENous Q8H PRN    FLUoxetine (PROzac) capsule 40 mg  40 mg Oral DAILY    albuterol (PROVENTIL HFA, VENTOLIN HFA, PROAIR HFA) inhaler 1 Puff  1 Puff Inhalation Q6H PRN   ______________________________________________________________________  EXPECTED LENGTH OF STAY: 3d 14h  ACTUAL LENGTH OF STAY:          4               Bry Roseline Hinkle MD

## 2020-06-16 NOTE — PROGRESS NOTES
Hospitalist Progress Note  Tomas Camejo MD  Answering service: [phone] or [phone] from in house phone      Date of Service:  2020  NAME:  Monie Galo  :  1950  MRN:  [id number]    Admission Summary:   Monie Galo is a 81 y.o. female who presents with whole body ache and chills      This is 81  female with past medical history notable for pneumonia,  tobacco abuse, chronic neck pain, breast cancer status post lumpectomy and radiation in , anemia, anxiety [facility] generalized body aches and pains, nausea, and persistent chest pain, chills  and shortness of breath.  She was seen in ER about a week ago, for dry cough, sob, headache and she had covid 19 test at that time was negative and she was given azithromycin and completed the course for a possible right lower lobe pneumonia on chest x-ray. No fever documented, pt but has intermittent chills at home then increasing whole body ache last several days, pt has mild dry cough,  noted that her pulse oximetry at home was 88-93% on room air after she had walked back to her bed from the bathroom.  No diagnose of asthma or COPD, no hx structural heart disease.  No urinary complaints.  Was hospitalized in February for pneumonia and had a reassuring echocardiogram at the time.   pain, cough, congestion, recent illness, palpitations, or dysuria. Pt lives home with . She is a retired icu nurse. Interval history / Subjective:   F/u Fever/bodyaches  Leukocytosis resolved  Creatinine improved  Wants to leave  Seems very anxious     Assessment & Plan:     Alcohol use/withdrawal  -CIWA  -Continue Banana bag  -The patient wants to leave the hospital but don't think that for now the patient has the mental capacity to take care of her health decisions. She would be a huge risk of a fall.  Spoke to the  and he agrees  -Monitor    Fever/N/V/Body aches/transaminitis  - flu -ve, COVID -ve x2   -Blood culture 6/12 no growth so far  -Hep panel negative  -Rickettsial ab negative  -US abd 6/13 Distended gallbladder with sludge and dilated CBD, compatible with biliary dysfunction  Incidental left pleural transudate  -On empiric Unasyn but stopped 6/14. Doxy added by ID 6/13  -Transaminitis improving  -Appreciate ID    Non infectious SIRS  -now resovled    SOB/Hypoxia/COPD  -mild/imaging unremarkable, wean off NC O2 as able. -outpatient PFTs    SHIVA  -probably ATN from contrast nephropathy/hypotension, ATN ruled out  -IV fluids  -now resolved    Weakness both hands (reported)  -With my examination could not really appreciate any focal weakness or any sensory loss  -? MRI, would hold for now as I think most likely it is sec to anxiety    Anxiety disorder  -chronic, but seems very anxious   -Appreciate Psych, readjusted the meds    Hyponatremia: mild, monitor      Smoking  -nicotine patch  -Counseled    Regular diet    Code status: Full  DVT prophylaxis: Lovenox  PTA: home  Baseline: independent  Spoke to the     Plan: Spoke to Dr Dirk Vines from Psych, the patient does not qualify for inpatient psych per him and does not need to stay in the hospital from psych standpoint. The patient is very weak. Spoke to the  and told him in detail about PT evaluation. He is persistent on taking the patient home with home health. He does not want to send the patient to rehab    Care Plan discussed with: Patient/Family and Nurse  Disposition: home     Hospital Problems  Date Reviewed: 6/14/2020          Codes Class Noted POA    * (Principal) SOB (shortness of breath) ICD-10-CM: R06.02  ICD-9-CM: 786.05  6/12/2020 Yes            Review of Systems:   Pertinent items are mentioned in interval history. Vital Signs:    Last 24hrs VS reviewed since prior progress note.  Most recent are:  Visit Vitals  /73 (BP 1 Location: Right arm, BP Patient Position: At rest)   Pulse 79 Temp 97.9 °F (36.6 °C)   Resp 16   Ht 5' 2\" (1.575 m)   Wt 58.6 kg (129 lb 3 oz)   SpO2 98%   Breastfeeding No   BMI 23.63 kg/m²         Intake/Output Summary (Last 24 hours) at 6/16/2020 1420  Last data filed at 6/16/2020 1407  Gross per 24 hour   Intake 960 ml   Output 400 ml   Net 560 ml        Physical Examination:   General:  Alert, oriented, No acute distress  Resp:  No accessory muscle use, Good AE, no wheezes, no rhonchi  Abd:  Soft, non-tender, non-distended  Extremities:  No cyanosis or clubbing, no significant edema  Neuro:  Grossly normal, no focal neuro deficits, follows commands   Psych:  Good insight, AAOx3, not agitated. Data Review:    Review and/or order of clinical lab test  Review and/or order of tests in the radiology section of CPT  Review and/or order of tests in the medicine section of CPT  Labs:     Recent Labs     06/16/20  0320 06/15/20  0305   WBC 11.0 10.7   HGB 8.3* 8.5*   HCT 26.7* 26.9*    247     Recent Labs     06/16/20  0316 06/15/20  0305 06/14/20  0157    133* 131*   K 3.7 3.8 4.4    102 98   CO2 23 25 23   BUN 14 28* 33*   CREA 0.55 0.87 1.89*   GLU 74 90 78   CA 8.2* 8.6 7.6*     Recent Labs     06/16/20  0316 06/15/20  0305 06/14/20  0157   * 153* 181*   * 139* 133*   TBILI 0.5 0.5 0.6   TP 5.8* 6.1* 5.8*   ALB 2.2* 2.3* 2.2*   GLOB 3.6 3.8 3.6     No results for input(s): INR, PTP, APTT, INREXT, INREXT in the last 72 hours. Recent Labs     06/15/20  0305   TIBC 201*   PSAT 40      Lab Results   Component Value Date/Time    Folate 27.4 (H) 06/15/2020 03:05 AM      No results for input(s): PH, PCO2, PO2 in the last 72 hours.   Recent Labs     06/14/20  0157   *     Lab Results   Component Value Date/Time    Cholesterol, total 132 02/23/2020 05:29 AM    HDL Cholesterol 61 02/23/2020 05:29 AM    LDL, calculated 61.8 02/23/2020 05:29 AM    Triglyceride 46 02/23/2020 05:29 AM    CHOL/HDL Ratio 2.2 02/23/2020 05:29 AM     No results found for: [facility]  Lab Results   Component Value Date/Time    Color YELLOW/STRAW 06/12/2020 04:57 PM    Appearance CLEAR 06/12/2020 04:57 PM    Specific gravity >1.030 (H) 06/12/2020 04:57 PM    pH (UA) 5.5 06/12/2020 04:57 PM    Protein Negative 06/12/2020 04:57 PM    Glucose Negative 06/12/2020 04:57 PM    Ketone Negative 06/12/2020 04:57 PM    Bilirubin Negative 06/12/2020 04:57 PM    Urobilinogen 1.0 06/12/2020 04:57 PM    Nitrites Negative 06/12/2020 04:57 PM    Leukocyte Esterase Negative 06/12/2020 04:57 PM    Epithelial cells FEW 06/12/2020 04:57 PM    Bacteria Negative 06/12/2020 04:57 PM    WBC 0-4 06/12/2020 04:57 PM    RBC 5-10 06/12/2020 04:57 PM     Medications Reviewed:     Current Facility-Administered Medications   Medication Dose Route Frequency    LORazepam (ATIVAN) tablet 1 mg  1 mg Oral Q1H PRN    [START ON 6/17/2020] FLUoxetine (PROzac) capsule 60 mg  60 mg Oral DAILY    busPIRone (BUSPAR) tablet 5 mg  5 mg Oral TID    clonazePAM (KlonoPIN) tablet 0.5 mg  0.5 mg Oral BID    [START ON 6/17/2020] thiamine HCL (B-1) tablet 100 mg  100 mg Oral DAILY    [START ON 6/17/2020] therapeutic multivitamin (THERAGRAN) tablet 1 Tab  1 Tab Oral DAILY    [START ON 8/20/9842] folic acid (FOLVITE) tablet 1 mg  1 mg Oral DAILY    doxycycline (VIBRA-TABS) tablet 100 mg  100 mg Oral Q12H    LORazepam (ATIVAN) injection 1 mg  1 mg IntraVENous Q1H PRN    enoxaparin (LOVENOX) injection 40 mg  40 mg SubCUTAneous Q24H    LORazepam (ATIVAN) injection 2 mg  2 mg IntraVENous Q1H PRN    nicotine (NICODERM CQ) 21 mg/24 hr patch 1 Patch  1 Patch TransDERmal Q24H    lactobac ac& pc-s.therm-b.anim (ALEXA Q/RISAQUAD)  1 Cap Oral DAILY    methyl salicylate-menthol (BENGAY) 15-10 % cream   Topical Q6H PRN    acetaminophen (TYLENOL) tablet 650 mg  650 mg Oral Q8H PRN    sodium chloride (NS) flush 5-40 mL  5-40 mL IntraVENous Q8H    sodium chloride (NS) flush 5-40 mL  5-40 mL IntraVENous PRN    naloxone (NARCAN) injection 0.4 mg  0.4 mg IntraVENous PRN    ondansetron (ZOFRAN) injection 4 mg  4 mg IntraVENous Q4H PRN    LORazepam (ATIVAN) injection 1 mg  1 mg IntraVENous Q8H PRN    albuterol (PROVENTIL HFA, VENTOLIN HFA, PROAIR HFA) inhaler 1 Puff  1 Puff Inhalation Q6H PRN   ______________________________________________________________________  EXPECTED LENGTH OF STAY: 3d 14h  ACTUAL LENGTH OF STAY:          John Wick MD

## 2020-06-16 NOTE — PROGRESS NOTES
Called [facility] Department on the phone no given and left the message twice but could not get a call back. Still don't feel that the patient can safely go home.  informed and agrees.

## 2020-06-16 NOTE — BH NOTES
Patient seen in consultation. Medications changes made. Denies any SI or plan. Not a risk to safety from a psychiatric perspective. Full note to follow.

## 2020-06-16 NOTE — PROGRESS NOTES
Reason for Admission: Patient presented with fever and body aches, now on CIWA for ETOH, MD evaluating for possible TDO- see notes                    RUR Score:   15% risk of re-admission                PCP: First and Last name:  Dr. Pauline Nick    Name of Practice: [facility]   Are you a current patient: Yes/No: Yes   Approximate date of last visit: 6/08/2020   Can you participate in a virtual visit if needed: N/A    Do you (patient/family) have any concerns for transition/discharge? TBD, patient's  denied questions at this time- asking about discharge date, TBD at this time, per hospitalist notes does not feel patient is stable for discharge at this time               Plan for utilizing home health: Patient's  endorses that the patient was not open to any home health services prior to admission. CM will monitor for home health needs      Current Advanced Directive/Advance Care Plan:  Full code, not on file             Transition of Care Plan:  TBD, PT/OT evaluations pending    The patient is confused at this time, CM deferred to NOK/spouse for initial assessment. The CM called the patient's , Mr. Keven Ram, in order to introduce the role of CM and assess for patient needs. The patient lives with her  in a two-story home, however, spouse endorses that their master bedroom is on the first level. The patient's  verified demographics and insurance information- Medicare A & B primary, has a Generic Commercial Supplement, [employer] per spouse. The patient is independent with ADLs and mobility at baseline, no DME utilized in the home. The patient is a retired ICU RN. The patient has PT/OT evaluations pending. The patient has two adult children, one lives locally, another lives in [place], [place]. The CM will follow for transitions of care needs. PT/OT evaluations pending, [facility] protocol.  LUCILA Garcia    14:09 p.m.- CM received consult for rehab placement- patient will need OT evaluation, will discuss further with patient and spouse. Patient is completely independent at baseline, RN reports patient is now sleeping- CM will call patient's  to discuss further, unclear if patient would qualify at this time. Will need OT evaluation, sitter-free for 24 hours. 14:17 p.m.- CM now received an update from MD that he now plans to discharge the patient home today instead of rehab. CM now notified by bedside RN that the MD plans to discharge home today- CM clarified with MD, discharge home today with home health services. Will need orders, will ask MD.     CM called the patient's - he confirmed that he does not want the patient to go to any rehab facility. The patient's  endorses that he will be at home to provide supervision and assistance with ADLs, he feel comfortable taking the patient home and agreeable for home health services- verbal Freedom of choice discussed and agreeable for [facility], [facility] patient. Referral sent via cclink. The patient's  can transport home. CM met with the patient at bedside, she verbalized agreement with [facility] and is tearful (happy) about the prospect of going home today. CM offered any other additional assistance/needs. Medicare pt has received reviewed and reviewed IM letter. Patient's  to transport. [facility] has accepted for services, information on AVS.     Care Management Interventions  PCP Verified by CM: Yes(Patient's spouse verified PCP as Dr. Arabella Campoverde )  Mode of Transport at Discharge:  Other (see comment)(Patient's spouse will transport home )  Transition of Care Consult (CM Consult): Discharge Planning  MyChart Signup: Yes  Health Maintenance Reviewed: Yes  Physical Therapy Consult: Yes  Occupational Therapy Consult: Yes  Current Support Network: Own Home, Lives with Spouse  Confirm Follow Up Transport: Family  Discharge Location  Discharge Placement: Unable to determine at this time

## 2020-06-16 NOTE — PROGRESS NOTES
Problem: Mobility Impaired (Adult and Pediatric)  Goal: *Acute Goals and Plan of Care (Insert Text)  Description: FUNCTIONAL STATUS PRIOR TO ADMISSION: Patient was independent and active without use of DME. Patient with severe anxiety. Patient reports drinking 1 martini a night while cooking. Patient currently on CIWA protocol. HOME SUPPORT PRIOR TO ADMISSION: The patient lived with her , but did not require assistance with functional mobility/tasks. Physical Therapy Goals  Initiated 6/16/2020  1. Patient will move from supine to sit and sit to supine, scoot up and down and roll side to side in bed with independence within 7 day(s). 2.  Patient will transfer from bed to chair and chair to bed with independence within 7 day(s). 3.  Patient will perform sit to stand with independence within 7 day(s). 4.  Patient will ambulate with independence for 300 feet within 7 day(s). 5.  Patient will ascend/descend 12 stairs with single handrail(s) with modified independence within 7 day(s). Outcome: Progressing Towards Goal    PHYSICAL THERAPY EVALUATION  Patient: Amy Li (82 y.o. female)  Date: 6/16/2020  Primary Diagnosis: SOB (shortness of breath) [R06.02]        Precautions:  Fall, Bed Alarm(+ Sitter), CIWA      ASSESSMENT  Based on the objective data described below, the patient presents with possible EtOH withdrawal, intention-like UE tremors, confusion (oriented to self only), labile emotions, ataxia in LEs, anxiety, + fall with sitter yesterday evening, and dysmetria with target reaching with UEs. At this time, patient is an extremely high fall risk - poor insight into deficits and need for assistance, impulsive, and frequent staggering episodes noted during gait assessment (unable to successfully dual-task). Patient requires contact guard to minimal assistance for all out-of bed mobility 2* impaired dynamic standing balance and increased distractibility.  If patient were to discharge home, she would need 24/ supervision/assistance from . This PT is very concerned for stair negotiation at home as patient is illustrating significant impulsivity and decreased environment awareness. Current Level of Function Impacting Discharge (mobility/balance): CGA - Min A    Functional Outcome Measure: The patient scored 11/28 on the Tinetti outcome measure which is indicative of a HIGH fall risk. Other factors to consider for discharge: CIWA, requiring a 24-hour sitter at bedside     Patient will benefit from skilled therapy intervention to address the above noted impairments. PLAN :  Recommendations and Planned Interventions: bed mobility training, transfer training, gait training, therapeutic exercises, patient and family training/education, and therapeutic activities      Frequency/Duration: Patient will be followed by physical therapy:  5 times a week to address goals. Recommendation for discharge: (in order for the patient to meet his/her long term goals)  To be determined: See in above assessment     This discharge recommendation:  A follow-up discussion with the attending provider and/or case management is planned    IF patient discharges home will need the following DME: to be determined (TBD)         SUBJECTIVE:   Patient stated My dogs are coming to tear these [socks] to shreds.     OBJECTIVE DATA SUMMARY:   HISTORY:    Past Medical History:   Diagnosis Date    Anemia     Anxiety     Breast cancer ([address] 2005    right lumpectomy    Chronic pain     neck pain    Cough     Depression     Hemorrhoid     Menopause 2005    Muscle pain     Neck problem     Pneumonia     sepis    S/P radiation therapy 2005    Sleep trouble     Thyroid disease     Urine troubles     unable to controll     Past Surgical History:   Procedure Laterality Date    HX BREAST LUMPECTOMY Right 2005    HX  SECTION      HX THYROIDECTOMY      IMPLANT BREAST SILICONE/EQ Bilateral 7110       Personal factors and/or comorbidities impacting plan of care: PMH    Home Situation  Home Environment: Private residence  One/Two Story Residence: Two story  Living Alone: No  Support Systems: Spouse/Significant Other/Partner, Friends \ neighbors  Patient Expects to be Discharged to[de-identified] Private residence  Current DME Used/Available at Home: None    EXAMINATION/PRESENTATION/DECISION MAKING:   Critical Behavior:  Neurologic State: Alert(LABILE emotions)  Orientation Level: Oriented to person, Disoriented to place, Disoriented to situation, Disoriented to time  Cognition: Decreased attention/concentration, Impulsive, Impaired decision making, Poor safety awareness  Safety/Judgement: Decreased awareness of environment, Decreased awareness of need for assistance, Decreased awareness of need for safety, Decreased insight into deficits  Hearing: Auditory  Auditory Impairment: None    Range Of Motion:  AROM: Within functional limits(Reports L shoulder impingement + L medial thigh pain)                       Strength:    Strength: Generally decreased, functional                    Tone & Sensation:   Tone: Normal              Sensation: Intact               Coordination:  Coordination: Generally decreased, functional(B UEs dysmetric w/ target practice; tremulous)    Functional Mobility:  Bed Mobility:     Supine to Sit: Supervision  Sit to Supine: Supervision  Scooting: Supervision  Transfers:  Sit to Stand: Contact guard assistance  Stand to Sit: Contact guard assistance                       Balance:   Sitting: Impaired; Without support  Sitting - Static: Good (unsupported)  Sitting - Dynamic: Fair (occasional)  Standing: Impaired; Without support  Standing - Static: Fair  Standing - Dynamic : Fair(Frequent staggering episodes)  Ambulation/Gait Training:  Distance (ft): 50 Feet (ft)(x 6)  Assistive Device: Gait belt(SS > B HHA > S HHA > Gait Belt)  Ambulation - Level of Assistance: Contact guard assistance;Minimal assistance     Gait Description (WDL): Exceptions to WDL  Gait Abnormalities: Ataxic;Decreased step clearance; Path deviations        Base of Support: Narrowed     Speed/Dayanna: Fluctuations  Step Length: Right shortened;Left shortened    Functional Measure:  Tinetti test:    Sitting Balance: 1  Arises: 1  Attempts to Rise: 1  Immediate Standing Balance: 0  Standing Balance: 0  Nudged: 0  Eyes Closed: 0  Turn 360 Degrees - Continuous/Discontinuous: 0  Turn 360 Degrees - Steady/Unsteady: 0  Sitting Down: 1  Balance Score: 4 Balance total score  Indication of Gait: 1  R Step Length/Height: 1  L Step Length/Height: 1  R Foot Clearance: 1  L Foot Clearance: 1  Step Symmetry: 1  Step Continuity: 0  Path: 0  Trunk: 0  Walking Time: 1  Gait Score: 7 Gait total score  Total Score: 11/28 Overall total score         Tinetti Tool Score Risk of Falls  <19 = High Fall Risk  19-24 = Moderate Fall Risk  25-28 = Low Fall Risk  Tinetti ME. Performance-Oriented Assessment of Mobility Problems in Elderly Patients. Garcia 68; [id number]. (Scoring Description: PT Bulletin Feb. 10, 1993)    Older adults: Mustapha Momin et al, 2009; n = 1000 [facility] elderly evaluated with ABC, ROBERT, ADL, and IADL)  · Mean ROBERT score for males aged 69-68 years = 26.21(3.40)  · Mean ROBERT score for females age 69-68 years = 25.16(4.30)  · Mean ROBERT score for males over 80 years = 23.29(6.02)  · Mean ROBERT score for females over 80 years = 17.20(8.32)     Based on the above components, the patient evaluation is determined to be of the following complexity level: LOW     Pain Rating:  L upper trapezius muscle pain  L medial thigh pain    Activity Tolerance:   Fair, Intention tremors, dysmetria noted with reaching tasks  Please refer to the flowsheet for vital signs taken during this treatment. After treatment patient left in no apparent distress:   Sitting up at edge of bed, Sitter present     COMMUNICATION/EDUCATION:   The patients plan of care was discussed with: Registered nurse. Fall prevention education was provided and the patient/caregiver indicated understanding., Patient/family have participated as able in goal setting and plan of care. , and Patient/family agree to work toward stated goals and plan of care.     Thank you for this referral.  Benjamin Banerjee, PT, DPT   Time Calculation: 19 mins

## 2020-06-16 NOTE — PROGRESS NOTES
0730:  Bedside shift change report given to Maddy Amaro RN (oncoming nurse) by Pito Blanco RN (offgoing nurse). Report included the following information SBAR, Kardex, Procedure Summary, Intake/Output, MAR, and Recent Results. 0800:  Patient awake, eating breakfast, alert to self, very tearful and questioning why she is here. RN provided information and patient continues to be tearful about withdrawal stating \"I don't understand I only drink one glass while making dinner\". RN provided emotional support, sitter at bedside, PRN klonopin given for anxiety. 0900:  Patient resting quietly, sitter at bedside. 1100:  Patient talking with Psych. 1300:  Patient sleeping quietly. 1545:  I have reviewed discharge instructions with the spouse. The spouse verbalized understanding. Instructions reviewed over the phone. Prescriptions called in to [facility]. Problem: Falls - Risk of  Goal: *Absence of Falls  Description: Document Bib Laguna Fall Risk and appropriate interventions in the flowsheet.   Outcome: Progressing Towards Goal  Note: Fall Risk Interventions:  Mobility Interventions: Bed/chair exit alarm, Communicate number of staff needed for ambulation/transfer, OT consult for ADLs, PT Consult for mobility concerns, Patient to call before getting OOB, PT Consult for assist device competence, Strengthening exercises (ROM-active/passive), Utilize walker, cane, or other assistive device, Utilize gait belt for transfers/ambulation    Mentation Interventions: Adequate sleep, hydration, pain control, Bed/chair exit alarm, Door open when patient unattended, Family/sitter at bedside, Gait belt with transfers/ambulation    Medication Interventions: Assess postural VS orthostatic hypotension, Bed/chair exit alarm, Evaluate medications/consider consulting pharmacy, Patient to call before getting OOB, Teach patient to arise slowly, Utilize gait belt for transfers/ambulation    Elimination Interventions: Call light in reach, Bed/chair exit alarm, Patient to call for help with toileting needs, Stay With Me (per policy), Toilet paper/wipes in reach, Toileting schedule/hourly rounds    History of Falls Interventions: Bed/chair exit alarm, Consult care management for discharge planning, Door open when patient unattended, Evaluate medications/consider consulting pharmacy, Investigate reason for fall, Room close to nurse's station, Utilize gait belt for transfer/ambulation, Assess for delayed presentation/identification of injury for 48 hrs (comment for end date), Vital signs minimum Q4HRs X 24 hrs (comment for end date)         Problem: Pain  Goal: *Control of Pain  Outcome: Progressing Towards Goal     Problem: General Infection Care Plan (Adult and Pediatric)  Goal: Improvement in signs and symptoms of infection  Outcome: Progressing Towards Goal     Problem: Pressure Injury - Risk of  Goal: *Prevention of pressure injury  Description: Document Dennis Scale and appropriate interventions in the flowsheet. Outcome: Progressing Towards Goal  Note: Pressure Injury Interventions:  Sensory Interventions: Assess changes in LOC, Discuss PT/OT consult with provider, Turn and reposition approx.  every two hours (pillows and wedges if needed)    Moisture Interventions: Minimize layers    Activity Interventions: Increase time out of bed, Pressure redistribution bed/mattress(bed type)    Mobility Interventions: HOB 30 degrees or less, Pressure redistribution bed/mattress (bed type)    Nutrition Interventions: Document food/fluid/supplement intake, Discuss nutritional consult with provider, Offer support with meals,snacks and hydration    Friction and Shear Interventions: Minimize layers                Problem: Alcohol Withdrawal  Goal: *STG: Remains safe in hospital  Outcome: Progressing Towards Goal

## 2020-06-17 ENCOUNTER — TELEPHONE (OUTPATIENT)
Dept: INTERNAL MEDICINE CLINIC | Age: 70
End: 2020-06-17

## 2020-06-17 ENCOUNTER — PATIENT MESSAGE (OUTPATIENT)
Dept: INTERNAL MEDICINE CLINIC | Age: 70
End: 2020-06-17

## 2020-06-17 LAB
BACTERIA SPEC CULT: NORMAL
SERVICE CMNT-IMP: NORMAL

## 2020-06-17 NOTE — TELEPHONE ENCOUNTER
6/17/20 11:23 AM--attempted to reach patient but she is not answering the phone at this time. She has a personal greeting in , so message was left introducing myself and the purpose of my call. My phone # was left in message for her return call. Advised in message that I will send our COVID-19 information and resource phone #s to her via her active [employer] acct. 6/18/20 2:23 PM--attempted once more to reach patient but she is not answering the phone at this time. This concludes this episode of care.

## 2020-06-18 ENCOUNTER — HOME CARE VISIT (OUTPATIENT)
Dept: SCHEDULING | Facility: HOME HEALTH | Age: 70
End: 2020-06-18
Payer: MEDICARE

## 2020-06-18 ENCOUNTER — HOME CARE VISIT (OUTPATIENT)
Dept: HOME HEALTH SERVICES | Facility: HOME HEALTH | Age: 70
End: 2020-06-18

## 2020-06-18 VITALS
HEART RATE: 82 BPM | SYSTOLIC BLOOD PRESSURE: 138 MMHG | OXYGEN SATURATION: 98 % | TEMPERATURE: 98.3 F | DIASTOLIC BLOOD PRESSURE: 78 MMHG | RESPIRATION RATE: 17 BRPM

## 2020-06-18 PROCEDURE — 3331090002 HH PPS REVENUE DEBIT

## 2020-06-18 PROCEDURE — G0151 HHCP-SERV OF PT,EA 15 MIN: HCPCS

## 2020-06-18 PROCEDURE — 3331090001 HH PPS REVENUE CREDIT

## 2020-06-18 PROCEDURE — 400013 HH SOC

## 2020-06-19 PROCEDURE — 3331090002 HH PPS REVENUE DEBIT

## 2020-06-19 PROCEDURE — 3331090001 HH PPS REVENUE CREDIT

## 2020-06-20 PROCEDURE — 3331090002 HH PPS REVENUE DEBIT

## 2020-06-20 PROCEDURE — 3331090001 HH PPS REVENUE CREDIT

## 2020-06-21 PROCEDURE — 3331090002 HH PPS REVENUE DEBIT

## 2020-06-21 PROCEDURE — 3331090001 HH PPS REVENUE CREDIT

## 2020-06-22 ENCOUNTER — HOME CARE VISIT (OUTPATIENT)
Dept: HOME HEALTH SERVICES | Facility: HOME HEALTH | Age: 70
End: 2020-06-22
Payer: MEDICARE

## 2020-06-22 PROCEDURE — 3331090001 HH PPS REVENUE CREDIT

## 2020-06-22 PROCEDURE — 3331090002 HH PPS REVENUE DEBIT

## 2020-06-22 NOTE — CDMP QUERY
Patient admitted with documentation of shortness of breath, and acute hypoxic respiratory failure. Maximum oxygen requirement during stay was 3lnc, with documentation of mild SOB only. If possible, please document in progress notes and d/c summary if you are evaluating and/or treating any of the following: 
 
=>  Hypoxia only =>Acute Hypoxemic Respiratory Failure (please document clinical indicators) 
=> Other Explanation of clinical findings 
=> Clinically Undetermined (no explanation for clinical findings) The medical record reflects the following: 
   Risk Factors: 68yo with previous admission of PNA, was seen in ER about a week ago for dry cough, SOB, HA, and negative COVID Clinical Indicators: Per ED MD-  Hypoxic to 88-93% at home and requiring 2 liters NC here. 6/13-Per RN [id number]: Bernardino Monte patient 4mg of morphine due to some noticed delayed speech. 0905-Also noticed patient's shallow RR of 10, SPO2 80% on room air. O2 titrated to 3lpm. SPO2 94%. No dsypnea noted at any time. 6/13-Per PN- SOB: mild/imaging unremarkable, wean off NC O2 as able. Resp:  No accessory muscle use, Good AE, no wheezes, no rhonchi RA by 6/15 Treatment: Monitor vital signs, labwork, imaging, pulse oximetry, oxygen Please clarify and document your clinical opinion in the progress notes and discharge summary including the definitive and/or presumptive diagnosis, (suspected or probable), related to the above clinical findings. Please include clinical findings supporting your diagnosis. REFERENCE:  
Hypoxemic respiratory failure is characterized by a PaO2 less than 60 mmHg (or 10 mmHg below COPD patients baseline) and a normal or low arterial PaCO2. Hypercapnic respiratory failure is characterized by a PaCO2 higher than 50 mmHg (or 10 mmHG above COPD patients baseline). Acute Respiratory Failure indicators include: - Respirations <12 or >25 - [employer] - Use of accessory muscles of respiration - Inability to speak in full sentences - Cyanosis AND 
 
- Pulse ox <90% RA or <95% on O2  
- pH <7.35 or >7.45  
- pO2 < 60 mm Hg (or 10mm below COPD patient's baseline) - pCO2 >50mm Hg (or 10mm above COPD patient's baseline)

## 2020-06-23 ENCOUNTER — HOME CARE VISIT (OUTPATIENT)
Dept: HOME HEALTH SERVICES | Facility: HOME HEALTH | Age: 70
End: 2020-06-23
Payer: MEDICARE

## 2020-06-23 ENCOUNTER — VIRTUAL VISIT (OUTPATIENT)
Dept: INTERNAL MEDICINE CLINIC | Age: 70
End: 2020-06-23

## 2020-06-23 DIAGNOSIS — E03.9 ACQUIRED HYPOTHYROIDISM: ICD-10-CM

## 2020-06-23 DIAGNOSIS — R20.2 PARESTHESIA: Primary | ICD-10-CM

## 2020-06-23 DIAGNOSIS — D53.9 MACROCYTIC ANEMIA: ICD-10-CM

## 2020-06-23 DIAGNOSIS — R74.8 ELEVATED LIVER ENZYMES: ICD-10-CM

## 2020-06-23 DIAGNOSIS — R25.1 TREMOR: ICD-10-CM

## 2020-06-23 DIAGNOSIS — M54.9 UPPER BACK PAIN: ICD-10-CM

## 2020-06-23 PROCEDURE — 3331090001 HH PPS REVENUE CREDIT

## 2020-06-23 PROCEDURE — 3331090002 HH PPS REVENUE DEBIT

## 2020-06-23 RX ORDER — TIZANIDINE 2 MG/1
2 TABLET ORAL 3 TIMES DAILY
Qty: 30 TAB | Refills: 1 | Status: SHIPPED | OUTPATIENT
Start: 2020-06-23 | End: 2021-02-03

## 2020-06-23 NOTE — PROGRESS NOTES
Analia Morales is a 81 y.o. female who presents for follow up on recent illness. Was admitted on June 21 with right lower lobe pneumonia. Patient left AMA. COVID negative. Treated with zpak. Reports breathing is improving. Still low energy, not eating well. Elevated liver enzymes and macrocytic anemia. Patient reports hospital thought she had alcohol withdrawal. She states she does drink alcohol, but not daily. She reports she was not getting her medications as scheduled in hospital.     Reports pain in upper back and left shoulder with loss of strength in both hands, especially left. Reports hand tremor. She feels that her symptoms came on after second shingrix shot. Did not have xray or c-spine done yet. Reports prior abnormal c-spine. This is an established visit conducted via telemedicine with video. The patient has been instructed that this meets HIPAA criteria and acknowledges and agrees to this method of visitation. Pursuant to the emergency declaration under the [facility] waiver authority and the [employer] Act, this Virtual Visit was conducted, with patient's consent, to reduce the patient's risk of exposure to COVID-19 and provide continuity of care for an established patient. Services were provided through a video synchronous discussion virtually to substitute for in-person clinic visit.         Past Medical History:   Diagnosis Date    Anemia     Anxiety     Breast cancer ([facility] [address] 2005    right lumpectomy    Chronic pain     neck pain    Cough     Depression     Hemorrhoid     Menopause 2005    Muscle pain     Neck problem     Pneumonia     sepis    S/P radiation therapy 2005    Sleep trouble     Thyroid disease     Urine troubles     unable to controll       Family History   Problem Relation Age of Onset    Cancer Mother        Social History     Socioeconomic History    Marital status:      Spouse name: Not on file    Number of children: Not on file    Years of education: Not on file    Highest education level: Not on file   Occupational History    Not on file   Social Needs    Financial resource strain: Not on file    Food insecurity     Worry: Not on file     Inability: Not on file    Transportation needs     Medical: Not on file     Non-medical: Not on file   Tobacco Use    Smoking status: Light Tobacco Smoker     Last attempt to quit: 1/1/2010     Years since quitting: 10.4    Smokeless tobacco: Current User   Substance and Sexual Activity    Alcohol use: Yes     Alcohol/week: 14.0 standard drinks     Types: 14 Shots of liquor per week     Frequency: 4 or more times a week     Drinks per session: 10 or more    Drug use: No    Sexual activity: Yes     Partners: Male     Birth control/protection: None   Lifestyle    Physical activity     Days per week: Not on file     Minutes per session: Not on file    Stress: Not on file   Relationships    Social connections     Talks on phone: Not on file     Gets together: Not on file     Attends Advent service: Not on file     Active member of club or organization: Not on file     Attends meetings of clubs or organizations: Not on file     Relationship status: Not on file    Intimate partner violence     Fear of current or ex partner: Not on file     Emotionally abused: Not on file     Physically abused: Not on file     Forced sexual activity: Not on file   Other Topics Concern    Not on file   Social History Narrative    Not on file       Current Outpatient Medications on File Prior to Visit   Medication Sig Dispense Refill    clonazePAM (KlonoPIN) 0.5 mg tablet Take 1 Tab by mouth two (2) times a day. Max Daily Amount: 1 mg. 10 Tab 0    FLUoxetine (PROzac) 20 mg capsule Take 3 Caps by mouth daily. 90 Cap 0    busPIRone (BUSPAR) 5 mg tablet Take 1 Tab by mouth three (3) times daily.  90 Tab 0    nicotine (NICODERM CQ) 21 mg/24 hr 1 Patch by TransDERmal route every twenty-four (24) hours for 30 days. 30 Patch 0    folic acid (FOLVITE) 1 mg tablet Take 1 Tab by mouth daily. 30 Tab 2    albuterol (PROVENTIL HFA, VENTOLIN HFA, PROAIR HFA) 90 mcg/actuation inhaler INHALE 1 2 PUFFS BY MOUTH EVERY 6 HOURS AS NEEDED FOR WHEEZING      albuterol sulfate 90 mcg/actuation aepb Take 1-2 Puffs by inhalation every six (6) hours as needed for Wheezing. 1 Inhaler 0    levothyroxine (SYNTHROID) 100 mcg tablet TAKE 1 TABLET EVERY DAY BEFORE BREAKFAST 90 Tab 3    permethrin (ACTICIN) 5 % topical cream permethrin 5 % topical cream   APPLY (THOROUGHLY MASSAGE INTO SKIN FROM HEAD TO SOLES OF FEET) BY TOPICAL ROUTE ONCE LEAVE ON FOR 8-14 HR, THEN REMOVE BY THOROUGH WASHING      dextroamphetamine (DEXTROSTAT) 10 mg tab TAKE 1 TABLET BY MOUTH TWICE A DAY  0    LORazepam (ATIVAN) 1 mg tablet TAKE 1 TABLET BY MOUTH TWICE A DAY AS NEEDED (Patient taking differently: 0.5-1 mg. For help sleeping) 60 Tab 1    promethazine (PHENERGAN) 25 mg tablet Take 1/2-1 tablet every 6 hours as needed for nausea. 30 Tab 2    ondansetron (ZOFRAN ODT) 4 mg disintegrating tablet Take 1 Tab by mouth every eight (8) hours as needed for Nausea. 30 Tab 2    colestipol (COLESTID) 1 gram tablet Take 1 Tab by mouth two (2) times a day. 60 Tab 3    naproxen sodium (ALEVE) 220 mg cap Take 220 mg by mouth every six (6) hours as needed for Pain.  thiamine HCL (B-1) 100 mg tablet Take 1 Tab by mouth daily. 30 Tab 2    therapeutic multivitamin (THERAGRAN) tablet Take 1 Tab by mouth daily. 30 Tab 2    QUEtiapine (SEROqueL) 25 mg tablet Take 25-50 mg by mouth nightly. For sleep       No current facility-administered medications on file prior to visit. Review of Systems  Pertinent items are noted in HPI.     Objective:     Gen: well appearing female  HEENT: normal conjunctiva, no audible congestion, patient does not see oral erythema, has MMM  Neck: patient does not feel enlarged or tender LAD or masses  Resp: normal respiratory effort, no audible wheezing. CV: patient does not feel palpitations or heart irregularity  Abd: patient does not feel abdominal tenderness or mass, patient does not notice distension  Extrem: patient does not see swelling in ankles or joints. Neuro: Alert and oriented, able to answer questions without difficulty        Assessment/Plan:       ICD-10-CM ICD-9-CM    1. Paresthesia R20.2 782.0 REFERRAL TO NEUROLOGY      XR SPINE CERV 4 OR 5 V   2. Tremor R25.1 781.0 REFERRAL TO NEUROLOGY   3. Macrocytic anemia D53.9 281.9 CBC W/O DIFF   4. Acquired hypothyroidism E03.9 244.9 TSH 3RD GENERATION   5. Elevated liver enzymes E10.8 977.2 METABOLIC PANEL, COMPREHENSIVE   6. Upper back pain M54.9 724.5 tiZANidine (ZANAFLEX) 2 mg tablet       This was a telemedicine visit with video.         Rolo London MD

## 2020-06-24 PROCEDURE — 3331090002 HH PPS REVENUE DEBIT

## 2020-06-24 PROCEDURE — 3331090001 HH PPS REVENUE CREDIT

## 2020-06-25 ENCOUNTER — HOME CARE VISIT (OUTPATIENT)
Dept: HOME HEALTH SERVICES | Facility: HOME HEALTH | Age: 70
End: 2020-06-25
Payer: MEDICARE

## 2020-06-25 ENCOUNTER — HOME CARE VISIT (OUTPATIENT)
Dept: SCHEDULING | Facility: HOME HEALTH | Age: 70
End: 2020-06-25
Payer: MEDICARE

## 2020-06-25 ENCOUNTER — TELEPHONE (OUTPATIENT)
Dept: INTERNAL MEDICINE CLINIC | Age: 70
End: 2020-06-25

## 2020-06-25 VITALS
DIASTOLIC BLOOD PRESSURE: 72 MMHG | TEMPERATURE: 98.7 F | SYSTOLIC BLOOD PRESSURE: 142 MMHG | OXYGEN SATURATION: 96 % | HEART RATE: 82 BPM

## 2020-06-25 PROCEDURE — 3331090001 HH PPS REVENUE CREDIT

## 2020-06-25 PROCEDURE — G0152 HHCP-SERV OF OT,EA 15 MIN: HCPCS

## 2020-06-25 PROCEDURE — 3331090002 HH PPS REVENUE DEBIT

## 2020-06-25 PROCEDURE — G0157 HHC PT ASSISTANT EA 15: HCPCS

## 2020-06-25 NOTE — TELEPHONE ENCOUNTER
Received triage call from Cy Mays ()   Cy Mays states the pts Peripheral neuropathy is getting worse. Cy Mays states that the pt is having panic attacks and her buspar 10mg isnt working and wants something stronger. Cy Mays states the neurologist the Dr. Sunni Paez referred prior dont have anything until august and they want to get in to see them sooner or with another neurologist  Cy Mays informed will send message to Dr. Sunni Paez. Pt verbalizes understanding of the instructions and has no further questions at this time.

## 2020-06-26 VITALS
TEMPERATURE: 98.7 F | SYSTOLIC BLOOD PRESSURE: 146 MMHG | RESPIRATION RATE: 18 BRPM | DIASTOLIC BLOOD PRESSURE: 72 MMHG | OXYGEN SATURATION: 97 % | HEART RATE: 73 BPM

## 2020-06-26 PROCEDURE — 3331090001 HH PPS REVENUE CREDIT

## 2020-06-26 PROCEDURE — 3331090002 HH PPS REVENUE DEBIT

## 2020-06-26 NOTE — DISCHARGE SUMMARY
Discharge Summary       PATIENT ID: Beryle Mako  MRN: [id number]   YOB: 1950    DATE OF ADMISSION: 6/12/2020  2:06 PM    DATE OF DISCHARGE: 6/16/2020  PRIMARY CARE PROVIDER: Cristopher Cantrell MD     ATTENDING PHYSICIAN: Dr Dwaine Day  DISCHARGING PROVIDER: Dwaine Day MD    To contact this individual call [phone] and ask the  to page. If unavailable ask to be transferred the Adult Hospitalist Department. CONSULTATIONS: IP CONSULT TO INFECTIOUS DISEASES  IP CONSULT TO PSYCHIATRY    PROCEDURES/SURGERIES: * No surgery found *    ADMITTING DIAGNOSES & HOSPITAL COURSE:   Alcohol use/withdrawal  -CIWA  -Continue Banana bag  -The patient wants to leave the hospital but don't think that for now the patient has the mental capacity to take care of her health decisions. She would be a huge risk of a fall. Spoke to the  and he agrees  -Monitor     Fever/N/V/Body aches/transaminitis  - flu -ve, COVID -ve x2   -Blood culture 6/12 no growth so far  -Hep panel negative  -Rickettsial ab negative  -US abd 6/13 Distended gallbladder with sludge and dilated CBD, compatible with biliary dysfunction  Incidental left pleural transudate  -On empiric Unasyn but stopped 6/14. Doxy added by ID 6/13  -Transaminitis improving  -Appreciate ID     Non infectious SIRS  -now resovled     SOB/Hypoxia/COPD  -mild/imaging unremarkable, wean off NC O2 as able. -outpatient PFTs     SHIVA  -probably ATN from contrast nephropathy/hypotension, ATN ruled out  -IV fluids  -now resolved     Weakness both hands (reported)  -With my examination could not really appreciate any focal weakness or any sensory loss  -? MRI, would hold for now as I think most likely it is sec to anxiety     Anxiety disorder  -chronic, but seems very anxious   -Appreciate Psych, readjusted the meds     Hyponatremia: mild, monitor        Smoking  -nicotine patch  -Counseled     Regular diet           DISCHARGE DIAGNOSES / PLAN:      1. Alcohol withdrawal     ADDITIONAL CARE RECOMMENDATIONS:   Please see PMD     PENDING TEST RESULTS:   At the time of discharge the following test results are still pending: none    FOLLOW UP APPOINTMENTS:    Follow-up Information     Follow up With Specialties Details Why Terence Longoria MD Internal Medicine On 6/23/2020 Hospital follow up telemedicine PCP appointment Tuesday, 6/23/20 @ 11:00 a.m.  [facility] On 6/18/2020 Home health PT/OT. Call agency if you have not heard from them by 12:00 p.m. [facility]  [phone]             DIET: Cardiac Diet    ACTIVITY: Activity as tolerated      DISCHARGE MEDICATIONS:  Discharge Medication List as of 6/16/2020  4:28 PM      START taking these medications    Details   busPIRone (BUSPAR) 5 mg tablet Take 1 Tab by mouth three (3) times daily. , Print, Disp-90 Tab, R-0      thiamine HCL (B-1) 100 mg tablet Take 1 Tab by mouth daily. , Print, Disp-30 Tab, R-2      therapeutic multivitamin (THERAGRAN) tablet Take 1 Tab by mouth daily. , Print, Disp-30 Tab, R-2      nicotine (NICODERM CQ) 21 mg/24 hr 1 Patch by TransDERmal route every twenty-four (24) hours for 30 days. , Print, Disp-30 Patch, R-0      folic acid (FOLVITE) 1 mg tablet Take 1 Tab by mouth daily. , Print, Disp-30 Tab, R-2         CONTINUE these medications which have CHANGED    Details   clonazePAM (KlonoPIN) 0.5 mg tablet Take 1 Tab by mouth two (2) times a day. Max Daily Amount: 1 mg., Print, Disp-10 Tab, R-0      FLUoxetine (PROzac) 20 mg capsule Take 3 Caps by mouth daily. , Print, Disp-90 Cap, R-0         CONTINUE these medications which have NOT CHANGED    Details   QUEtiapine (SEROqueL) 25 mg tablet Take 25-50 mg by mouth nightly.  For sleep, Historical Med      levothyroxine (SYNTHROID) 100 mcg tablet TAKE 1 TABLET EVERY DAY BEFORE BREAKFAST, Normal, Disp-90 Tab, R-3      dextroamphetamine (DEXTROSTAT) 10 mg tab TAKE 1 TABLET BY MOUTH TWICE A DAY, Historical Med, R-0      albuterol (PROVENTIL HFA, VENTOLIN HFA, PROAIR HFA) 90 mcg/actuation inhaler INHALE 1 2 PUFFS BY MOUTH EVERY 6 HOURS AS NEEDED FOR WHEEZING, Historical Med      albuterol sulfate 90 mcg/actuation aepb Take 1-2 Puffs by inhalation every six (6) hours as needed for Wheezing., Print, Disp-1 Inhaler, R-0      permethrin (ACTICIN) 5 % topical cream permethrin 5 % topical cream   APPLY (THOROUGHLY MASSAGE INTO SKIN FROM HEAD TO SOLES OF FEET) BY TOPICAL ROUTE ONCE LEAVE ON FOR 8-14 HR, THEN REMOVE BY THOROUGH WASHING, Historical Med      LORazepam (ATIVAN) 1 mg tablet TAKE 1 TABLET BY MOUTH TWICE A DAY AS NEEDED, Phone InNot to exceed 4 additional fills before 05/19/2019Disp-60 Tab, R-1      promethazine (PHENERGAN) 25 mg tablet Take 1/2-1 tablet every 6 hours as needed for nausea., Normal, Disp-30 Tab, R-2      ondansetron (ZOFRAN ODT) 4 mg disintegrating tablet Take 1 Tab by mouth every eight (8) hours as needed for Nausea., Normal, Disp-30 Tab, R-2      colestipol (COLESTID) 1 gram tablet Take 1 Tab by mouth two (2) times a day., Normal, Disp-60 Tab, R-3      naproxen sodium (ALEVE) 220 mg cap Take  by mouth., Historical Med         STOP taking these medications       azithromycin (ZITHROMAX) 250 mg tablet Comments:   Reason for Stopping:         VIIBRYD 40 mg tab tablet Comments:   Reason for Stopping:         traZODone (DESYREL) 150 mg tablet Comments:   Reason for Stopping:         ARIPiprazole (ABILIFY) 5 mg tablet Comments:   Reason for Stopping:         sertraline (ZOLOFT) 100 mg tablet Comments:   Reason for Stopping:         Bacillus coagulans (PROBIOTIC, B. COAGULANS, PO) Comments:   Reason for Stopping:                 NOTIFY YOUR PHYSICIAN FOR ANY OF THE FOLLOWING:   Fever over 101 degrees for 24 hours.    Chest pain, shortness of breath, fever, chills, nausea, vomiting, diarrhea, change in mentation, falling, weakness, bleeding. Severe pain or pain not relieved by medications. Or, any other signs or symptoms that you may have questions about.     DISPOSITION:  x  Home With:   OT  PT  HH  RN       Long term SNF/Inpatient Rehab    Independent/assisted living    Hospice    Other:       PATIENT CONDITION AT DISCHARGE:     Functional status    Poor     Deconditioned    x Independent      Cognition    x Lucid     Forgetful     Dementia      Catheters/lines (plus indication)    Sultana     PICC     PEG    x None      Code status   x  Full code     DNR      PHYSICAL EXAMINATION AT DISCHARGE:  Please see progress note      CHRONIC MEDICAL DIAGNOSES:  Problem List as of 6/16/2020 Date Reviewed: 6/14/2020          Codes Class Noted - Resolved    Wheezing ICD-10-CM: R06.2  ICD-9-CM: 786.07  2/22/2020 - Present        Influenza A ICD-10-CM: J10.1  ICD-9-CM: 487.1  2/22/2020 - Present        Acute respiratory failure with hypoxia ([facility] [address] ICD-10-CM: J96.01  ICD-9-CM: 518.81  2/22/2020 - Present        Cervical disc disease ICD-10-CM: M50.90  ICD-9-CM: 722.91  1/13/2016 - Present        Family history of melanoma ICD-10-CM: Z80.8  ICD-9-CM: V16.8  1/13/2016 - Present        Thyroid activity decreased ICD-10-CM: E03.9  ICD-9-CM: 244.9  10/14/2015 - Present        Insomnia ICD-10-CM: G47.00  ICD-9-CM: 780.52  9/29/2015 - Present        Hypothyroidism ICD-10-CM: E03.9  ICD-9-CM: 244.9  9/29/2015 - Present        Anxiety and depression ICD-10-CM: F41.9, F32.9  ICD-9-CM: 300.00, 311  9/29/2015 - Present        RESOLVED: Anxiety ICD-10-CM: F41.9  ICD-9-CM: 300.00  9/29/2015 - 9/29/2015        RESOLVED: Bronchitis ICD-10-CM: J40  ICD-9-CM: 131  9/29/2015 - 3/13/2019        * (Principal) SOB (shortness of breath) ICD-10-CM: R06.02  ICD-9-CM: 786.05  6/12/2020 - Present              Greater than 37 minutes were spent with the patient on counseling and coordination of care    Signed:   Naseem Robles MD  6/26/2020  9:35 AM   .

## 2020-06-26 NOTE — TELEPHONE ENCOUNTER
Called, spoke to pt  Received two pt identifiers  Pt informed per Dr. Brandi Salinas to call their practice again and see if either one of the other doctors have openings. Pt informed per Dr. Brandi Salinas to talk with her psych NP about her meds due to her not prescribing such meds. Pt verbalizes understanding of the instructions and has no further questions at this time.

## 2020-06-27 PROCEDURE — 3331090002 HH PPS REVENUE DEBIT

## 2020-06-27 PROCEDURE — 3331090001 HH PPS REVENUE CREDIT

## 2020-06-28 PROCEDURE — 3331090001 HH PPS REVENUE CREDIT

## 2020-06-28 PROCEDURE — 3331090002 HH PPS REVENUE DEBIT

## 2020-06-29 PROCEDURE — 3331090001 HH PPS REVENUE CREDIT

## 2020-06-29 PROCEDURE — 3331090002 HH PPS REVENUE DEBIT

## 2020-06-30 ENCOUNTER — HOSPITAL ENCOUNTER (OUTPATIENT)
Dept: GENERAL RADIOLOGY | Age: 70
Discharge: HOME | End: 2020-06-30
Payer: MEDICARE

## 2020-06-30 ENCOUNTER — HOME CARE VISIT (OUTPATIENT)
Dept: SCHEDULING | Facility: HOME HEALTH | Age: 70
End: 2020-06-30
Payer: MEDICARE

## 2020-06-30 ENCOUNTER — HOSPITAL ENCOUNTER (OUTPATIENT)
Dept: LAB | Age: 70
Discharge: HOME | End: 2020-06-30
Payer: MEDICARE

## 2020-06-30 ENCOUNTER — TELEPHONE (OUTPATIENT)
Dept: INTERNAL MEDICINE CLINIC | Age: 70
End: 2020-06-30

## 2020-06-30 DIAGNOSIS — R20.2 PARESTHESIA: ICD-10-CM

## 2020-06-30 PROCEDURE — 3331090002 HH PPS REVENUE DEBIT

## 2020-06-30 PROCEDURE — 85027 COMPLETE CBC AUTOMATED: CPT

## 2020-06-30 PROCEDURE — 72050 X-RAY EXAM NECK SPINE 4/5VWS: CPT

## 2020-06-30 PROCEDURE — 36415 COLL VENOUS BLD VENIPUNCTURE: CPT

## 2020-06-30 PROCEDURE — 80053 COMPREHEN METABOLIC PANEL: CPT

## 2020-06-30 PROCEDURE — 84443 ASSAY THYROID STIM HORMONE: CPT

## 2020-06-30 PROCEDURE — 3331090001 HH PPS REVENUE CREDIT

## 2020-07-01 LAB
ALBUMIN SERPL-MCNC: 3.8 G/DL (ref 3.8–4.8)
ALBUMIN/GLOB SERPL: 1 {RATIO} (ref 1.2–2.2)
ALP SERPL-CCNC: 54 IU/L (ref 39–117)
ALT SERPL-CCNC: 12 IU/L (ref 0–32)
AST SERPL-CCNC: 18 IU/L (ref 0–40)
BILIRUB SERPL-MCNC: 0.3 MG/DL (ref 0–1.2)
BUN SERPL-MCNC: 16 MG/DL (ref 8–27)
BUN/CREAT SERPL: 15 (ref 12–28)
CALCIUM SERPL-MCNC: 10.2 MG/DL (ref 8.7–10.3)
CHLORIDE SERPL-SCNC: 96 MMOL/L (ref 96–106)
CO2 SERPL-SCNC: 24 MMOL/L (ref 20–29)
CREAT SERPL-MCNC: 1.05 MG/DL (ref 0.57–1)
ERYTHROCYTE [DISTWIDTH] IN BLOOD BY AUTOMATED COUNT: 14.7 % (ref 11.7–15.4)
GLOBULIN SER CALC-MCNC: 3.7 G/DL (ref 1.5–4.5)
GLUCOSE SERPL-MCNC: 98 MG/DL (ref 65–99)
HCT VFR BLD AUTO: 32.8 % (ref 34–46.6)
HGB BLD-MCNC: 10.1 G/DL (ref 11.1–15.9)
MCH RBC QN AUTO: 32.6 PG (ref 26.6–33)
MCHC RBC AUTO-ENTMCNC: 30.8 G/DL (ref 31.5–35.7)
MCV RBC AUTO: 106 FL (ref 79–97)
PLATELET # BLD AUTO: 499 X10E3/UL (ref 150–450)
POTASSIUM SERPL-SCNC: 5 MMOL/L (ref 3.5–5.2)
PROT SERPL-MCNC: 7.5 G/DL (ref 6–8.5)
RBC # BLD AUTO: 3.1 X10E6/UL (ref 3.77–5.28)
SODIUM SERPL-SCNC: 136 MMOL/L (ref 134–144)
TSH SERPL DL<=0.005 MIU/L-ACNC: 21.33 UIU/ML (ref 0.45–4.5)
WBC # BLD AUTO: 8.8 X10E3/UL (ref 3.4–10.8)

## 2020-07-01 PROCEDURE — 3331090002 HH PPS REVENUE DEBIT

## 2020-07-01 PROCEDURE — 3331090001 HH PPS REVENUE CREDIT

## 2020-07-02 ENCOUNTER — HOME CARE VISIT (OUTPATIENT)
Dept: HOME HEALTH SERVICES | Facility: HOME HEALTH | Age: 70
End: 2020-07-02
Payer: MEDICARE

## 2020-07-02 ENCOUNTER — HOME CARE VISIT (OUTPATIENT)
Dept: SCHEDULING | Facility: HOME HEALTH | Age: 70
End: 2020-07-02
Payer: MEDICARE

## 2020-07-02 VITALS
DIASTOLIC BLOOD PRESSURE: 62 MMHG | HEART RATE: 89 BPM | TEMPERATURE: 97.5 F | RESPIRATION RATE: 18 BRPM | OXYGEN SATURATION: 98 % | SYSTOLIC BLOOD PRESSURE: 120 MMHG

## 2020-07-02 PROCEDURE — 3331090001 HH PPS REVENUE CREDIT

## 2020-07-02 PROCEDURE — G0151 HHCP-SERV OF PT,EA 15 MIN: HCPCS

## 2020-07-02 PROCEDURE — 3331090002 HH PPS REVENUE DEBIT

## 2020-07-03 ENCOUNTER — HOME CARE VISIT (OUTPATIENT)
Dept: SCHEDULING | Facility: HOME HEALTH | Age: 70
End: 2020-07-03
Payer: MEDICARE

## 2020-07-03 PROCEDURE — 3331090002 HH PPS REVENUE DEBIT

## 2020-07-03 PROCEDURE — G0152 HHCP-SERV OF OT,EA 15 MIN: HCPCS

## 2020-07-03 PROCEDURE — 3331090001 HH PPS REVENUE CREDIT

## 2020-07-04 PROCEDURE — 3331090001 HH PPS REVENUE CREDIT

## 2020-07-04 PROCEDURE — 3331090002 HH PPS REVENUE DEBIT

## 2020-07-05 VITALS
HEART RATE: 80 BPM | TEMPERATURE: 98 F | DIASTOLIC BLOOD PRESSURE: 82 MMHG | SYSTOLIC BLOOD PRESSURE: 140 MMHG | OXYGEN SATURATION: 98 %

## 2020-07-05 PROCEDURE — 3331090002 HH PPS REVENUE DEBIT

## 2020-07-05 PROCEDURE — 3331090001 HH PPS REVENUE CREDIT

## 2020-07-06 ENCOUNTER — HOME CARE VISIT (OUTPATIENT)
Dept: SCHEDULING | Facility: HOME HEALTH | Age: 70
End: 2020-07-06
Payer: MEDICARE

## 2020-07-06 VITALS
OXYGEN SATURATION: 99 % | HEART RATE: 77 BPM | RESPIRATION RATE: 18 BRPM | SYSTOLIC BLOOD PRESSURE: 130 MMHG | DIASTOLIC BLOOD PRESSURE: 82 MMHG | TEMPERATURE: 98.5 F

## 2020-07-06 PROCEDURE — G0157 HHC PT ASSISTANT EA 15: HCPCS

## 2020-07-06 PROCEDURE — 3331090001 HH PPS REVENUE CREDIT

## 2020-07-06 PROCEDURE — 3331090002 HH PPS REVENUE DEBIT

## 2020-07-07 ENCOUNTER — HOME CARE VISIT (OUTPATIENT)
Dept: SCHEDULING | Facility: HOME HEALTH | Age: 70
End: 2020-07-07
Payer: MEDICARE

## 2020-07-07 ENCOUNTER — HOME CARE VISIT (OUTPATIENT)
Dept: HOME HEALTH SERVICES | Facility: HOME HEALTH | Age: 70
End: 2020-07-07
Payer: MEDICARE

## 2020-07-07 VITALS
SYSTOLIC BLOOD PRESSURE: 112 MMHG | TEMPERATURE: 99.3 F | OXYGEN SATURATION: 97 % | HEART RATE: 77 BPM | DIASTOLIC BLOOD PRESSURE: 70 MMHG

## 2020-07-07 PROCEDURE — G0152 HHCP-SERV OF OT,EA 15 MIN: HCPCS

## 2020-07-07 PROCEDURE — 3331090001 HH PPS REVENUE CREDIT

## 2020-07-07 PROCEDURE — 3331090002 HH PPS REVENUE DEBIT

## 2020-07-08 PROCEDURE — 3331090001 HH PPS REVENUE CREDIT

## 2020-07-08 PROCEDURE — 3331090002 HH PPS REVENUE DEBIT

## 2020-07-09 PROCEDURE — 3331090002 HH PPS REVENUE DEBIT

## 2020-07-09 PROCEDURE — 3331090001 HH PPS REVENUE CREDIT

## 2020-07-10 ENCOUNTER — HOME CARE VISIT (OUTPATIENT)
Dept: HOME HEALTH SERVICES | Facility: HOME HEALTH | Age: 70
End: 2020-07-10
Payer: MEDICARE

## 2020-07-10 PROCEDURE — 3331090002 HH PPS REVENUE DEBIT

## 2020-07-10 PROCEDURE — 3331090001 HH PPS REVENUE CREDIT

## 2020-07-11 PROCEDURE — 3331090002 HH PPS REVENUE DEBIT

## 2020-07-11 PROCEDURE — 3331090001 HH PPS REVENUE CREDIT

## 2020-07-12 PROCEDURE — 3331090002 HH PPS REVENUE DEBIT

## 2020-07-12 PROCEDURE — 3331090001 HH PPS REVENUE CREDIT

## 2020-07-13 PROCEDURE — 3331090001 HH PPS REVENUE CREDIT

## 2020-07-13 PROCEDURE — 3331090002 HH PPS REVENUE DEBIT

## 2020-07-14 PROCEDURE — 3331090002 HH PPS REVENUE DEBIT

## 2020-07-14 PROCEDURE — 3331090001 HH PPS REVENUE CREDIT

## 2020-07-15 ENCOUNTER — HOME CARE VISIT (OUTPATIENT)
Dept: HOME HEALTH SERVICES | Facility: HOME HEALTH | Age: 70
End: 2020-07-15
Payer: MEDICARE

## 2020-07-15 PROCEDURE — 3331090001 HH PPS REVENUE CREDIT

## 2020-07-15 PROCEDURE — 3331090002 HH PPS REVENUE DEBIT

## 2020-07-24 ENCOUNTER — HOME CARE VISIT (OUTPATIENT)
Dept: HOME HEALTH SERVICES | Facility: HOME HEALTH | Age: 70
End: 2020-07-24
Payer: MEDICARE

## 2020-08-04 ENCOUNTER — OFFICE VISIT (OUTPATIENT)
Dept: NEUROLOGY | Age: 70
End: 2020-08-04
Payer: MEDICARE

## 2020-08-04 VITALS
DIASTOLIC BLOOD PRESSURE: 84 MMHG | TEMPERATURE: 98.3 F | SYSTOLIC BLOOD PRESSURE: 120 MMHG | HEIGHT: 62 IN | OXYGEN SATURATION: 98 % | RESPIRATION RATE: 18 BRPM | WEIGHT: 117 LBS | BODY MASS INDEX: 21.53 KG/M2 | HEART RATE: 83 BPM

## 2020-08-04 DIAGNOSIS — G56.01 CARPAL TUNNEL SYNDROME OF RIGHT WRIST: ICD-10-CM

## 2020-08-04 DIAGNOSIS — F41.9 ANXIETY: ICD-10-CM

## 2020-08-04 DIAGNOSIS — M54.12 CERVICAL RADICULOPATHY: ICD-10-CM

## 2020-08-04 DIAGNOSIS — E03.9 ACQUIRED HYPOTHYROIDISM: ICD-10-CM

## 2020-08-04 DIAGNOSIS — G56.01 CARPAL TUNNEL SYNDROME OF RIGHT WRIST: Primary | ICD-10-CM

## 2020-08-04 PROCEDURE — 3017F COLORECTAL CA SCREEN DOC REV: CPT | Performed by: PSYCHIATRY & NEUROLOGY

## 2020-08-04 PROCEDURE — 99204 OFFICE O/P NEW MOD 45 MIN: CPT | Performed by: PSYCHIATRY & NEUROLOGY

## 2020-08-04 PROCEDURE — G9717 DOC PT DX DEP/BP F/U NT REQ: HCPCS | Performed by: PSYCHIATRY & NEUROLOGY

## 2020-08-04 PROCEDURE — G8420 CALC BMI NORM PARAMETERS: HCPCS | Performed by: PSYCHIATRY & NEUROLOGY

## 2020-08-04 PROCEDURE — G8427 DOCREV CUR MEDS BY ELIG CLIN: HCPCS | Performed by: PSYCHIATRY & NEUROLOGY

## 2020-08-04 PROCEDURE — G9899 SCRN MAM PERF RSLTS DOC: HCPCS | Performed by: PSYCHIATRY & NEUROLOGY

## 2020-08-04 PROCEDURE — 1090F PRES/ABSN URINE INCON ASSESS: CPT | Performed by: PSYCHIATRY & NEUROLOGY

## 2020-08-04 PROCEDURE — G8536 NO DOC ELDER MAL SCRN: HCPCS | Performed by: PSYCHIATRY & NEUROLOGY

## 2020-08-04 PROCEDURE — G8399 PT W/DXA RESULTS DOCUMENT: HCPCS | Performed by: PSYCHIATRY & NEUROLOGY

## 2020-08-04 PROCEDURE — 1101F PT FALLS ASSESS-DOCD LE1/YR: CPT | Performed by: PSYCHIATRY & NEUROLOGY

## 2020-08-04 NOTE — PROGRESS NOTES
NEUROLOGY HISTORY AND PHYSICAL    Name Werner Sutherland Age 81 y.o. MRN [id number]  1950     Referring Physician: Matt Naranjo MD      Chief Complaint:  Hand pain. This is a 81 y.o. Right handed female with a medical histor of C4-5 radiculopathy. She was dianosed by a neurosurgeon. Epidural injections finally relieved the discomfort after conservative management failed. 4 months ago she received a shingles vaccine and noted that she had a recurrence of the pain. Assessment and Plan  1. Carpal tunnel syndrome of right  Needs EMG    2. Cervical radiculopathy left  MRI of the c-spine  Has atrophy of muscles  Hs sensory loss      3. Acquired hypothyroidism  continue levothyroxine    4. Anxiety  Continue buspirone    No Known Allergies        Current Outpatient Medications   Medication Sig    gabapentin (NEURONTIN) 300 mg capsule Take 300 mg by mouth four (4) times daily. Take 1 capsule by mouth three times a day.  FLUoxetine (PROzac) 20 mg capsule Take 3 Caps by mouth daily.  therapeutic multivitamin (THERAGRAN) tablet Take 1 Tab by mouth daily.  folic acid (FOLVITE) 1 mg tablet Take 1 Tab by mouth daily.  QUEtiapine (SEROqueL) 25 mg tablet Take 25-50 mg by mouth nightly. For sleep    levothyroxine (SYNTHROID) 100 mcg tablet TAKE 1 TABLET EVERY DAY BEFORE BREAKFAST    dextroamphetamine (DEXTROSTAT) 10 mg tab TAKE 1 TABLET BY MOUTH TWICE A DAY    LORazepam (ATIVAN) 1 mg tablet TAKE 1 TABLET BY MOUTH TWICE A DAY AS NEEDED (Patient taking differently: 0.5-1 mg. For help sleeping)    ondansetron (ZOFRAN ODT) 4 mg disintegrating tablet Take 1 Tab by mouth every eight (8) hours as needed for Nausea.  naproxen sodium (ALEVE) 220 mg cap Take 220 mg by mouth every six (6) hours as needed for Pain.  tiZANidine (ZANAFLEX) 2 mg tablet Take 1 Tab by mouth three (3) times daily. As needed muscle spasm.  clonazePAM (KlonoPIN) 0.5 mg tablet Take 1 Tab by mouth two (2) times a day. Max Daily Amount: 1 mg.  busPIRone (BUSPAR) 5 mg tablet Take 1 Tab by mouth three (3) times daily.  thiamine HCL (B-1) 100 mg tablet Take 1 Tab by mouth daily.  albuterol (PROVENTIL HFA, VENTOLIN HFA, PROAIR HFA) 90 mcg/actuation inhaler INHALE 1 2 PUFFS BY MOUTH EVERY 6 HOURS AS NEEDED FOR WHEEZING    albuterol sulfate 90 mcg/actuation aepb Take 1-2 Puffs by inhalation every six (6) hours as needed for Wheezing.  permethrin (ACTICIN) 5 % topical cream permethrin 5 % topical cream   APPLY (THOROUGHLY MASSAGE INTO SKIN FROM HEAD TO SOLES OF FEET) BY TOPICAL ROUTE ONCE LEAVE ON FOR 8-14 HR, THEN REMOVE BY THOROUGH WASHING    promethazine (PHENERGAN) 25 mg tablet Take 1/2-1 tablet every 6 hours as needed for nausea.  colestipol (COLESTID) 1 gram tablet Take 1 Tab by mouth two (2) times a day. No current facility-administered medications for this visit. Past Medical History:   Diagnosis Date    Anemia     Anxiety     Breast cancer ([facility] [address] 2005    right lumpectomy    Chronic pain     neck pain    Cough     Depression     Hemorrhoid     Menopause 2005    Muscle pain     Neck problem     Pneumonia     sepis    S/P radiation therapy 2005    Sleep trouble     Thyroid disease     Urine troubles     unable to controll        Social History     Tobacco Use    Smoking status: Light Tobacco Smoker     Last attempt to quit: 1/1/2010     Years since quitting: 10.5    Smokeless tobacco: Current User   Substance Use Topics    Alcohol use: Yes     Alcohol/week: 14.0 standard drinks     Types: 14 Shots of liquor per week     Frequency: 4 or more times a week     Drinks per session: 10 or more    Drug use: No      Family History   Problem Relation Age of Onset    Cancer Mother      [employer]   Constitutional: Negative for chills and fever. HENT: Negative for ear pain. Eyes: Negative for blurred vision, double vision, pain and discharge.    Respiratory: Negative for cough, hemoptysis and shortness of breath. Cardiovascular: Negative for chest pain, palpitations, orthopnea and claudication. Gastrointestinal: Negative for constipation, diarrhea, nausea and vomiting. Genitourinary: Negative for flank pain and hematuria. Musculoskeletal: Positive for myalgias. Negative for back pain. Skin: Negative for itching and rash. Neurological: Positive for tingling, sensory change, focal weakness and weakness. Negative for dizziness and headaches. Endo/Heme/Allergies: Negative for environmental allergies. Does not bruise/bleed easily. Psychiatric/Behavioral: Negative for depression and hallucinations. The patient is not nervous/anxious. Exam  Visit Vitals  /84   Pulse 83   Temp 98.3 °F (36.8 °C) (Oral)   Resp 18   Ht 5' 2\" (1.575 m)   Wt 117 lb (53.1 kg)   SpO2 98%   BMI 21.40 kg/m²         General: Well developed, well nourished. Patient in no distress   Head: Normocephalic, atraumatic, anicteric sclera   Neck Normal ROM, No thyromegally   Lungs:  Clear to auscultation    Cardiac: Regular rate and rhythm with no murmurs. Abd: Bowel sounds were audible   Ext: No pedal edema   Skin: Supple no rash     NeurologicExam:    Mental Status: Alert and oriented to person place and time   Speech: Fluent no aphasia or dysarthria. Cranial Nerves:   II-XII Intact    Motor:  Pronator teres weakness on the left  Brachialis atrophy on the left  Weakness of the left deltoid    Reflexes:   Deep tendon reflexes 2+/4 and symmetric. Sensory:   Loss of sensation of the median innervated fingers bilaterally   Gait:  Gait is balanced    Tremor:   No tremor noted. Cerebellar:  Coordination intact. Neurovascular: No carotid bruits.  No JVD      Lab Review  Lab Results   Component Value Date/Time    WBC 8.8 06/30/2020 11:13 AM    HCT 32.8 (L) 06/30/2020 11:13 AM    HGB 10.1 (L) 06/30/2020 11:13 AM    PLATELET 755 (H) 13/48/7015 11:13 AM     Lab Results   Component Value Date/Time Sodium 136 06/30/2020 11:13 AM    Potassium 5.0 06/30/2020 11:13 AM    Chloride 96 06/30/2020 11:13 AM    CO2 24 06/30/2020 11:13 AM    Glucose 98 06/30/2020 11:13 AM    BUN 16 06/30/2020 11:13 AM    Creatinine 1.05 (H) 06/30/2020 11:13 AM    Calcium 10.2 06/30/2020 11:13 AM     Lab Results   Component Value Date/Time    Vitamin B12 1,749 (H) 06/15/2020 03:05 AM    Folate 27.4 (H) 06/15/2020 03:05 AM     Lab Results   Component Value Date/Time    LDL, calculated 61.8 02/23/2020 05:29 AM     Lab Results   Component Value Date/Time    Hemoglobin A1c 5.6 02/23/2020 05:29 AM

## 2020-08-07 ENCOUNTER — OFFICE VISIT (OUTPATIENT)
Dept: NEUROLOGY | Age: 70
End: 2020-08-07
Payer: MEDICARE

## 2020-08-07 VITALS
TEMPERATURE: 98.2 F | OXYGEN SATURATION: 98 % | HEART RATE: 83 BPM | DIASTOLIC BLOOD PRESSURE: 78 MMHG | RESPIRATION RATE: 18 BRPM | HEIGHT: 62 IN | WEIGHT: 117 LBS | BODY MASS INDEX: 21.53 KG/M2 | SYSTOLIC BLOOD PRESSURE: 124 MMHG

## 2020-08-07 DIAGNOSIS — M54.12 CERVICAL RADICULOPATHY: ICD-10-CM

## 2020-08-07 DIAGNOSIS — G56.02 LEFT CARPAL TUNNEL SYNDROME: ICD-10-CM

## 2020-08-07 PROCEDURE — 95886 MUSC TEST DONE W/N TEST COMP: CPT | Performed by: PSYCHIATRY & NEUROLOGY

## 2020-08-07 PROCEDURE — 95913 NRV CNDJ TEST 13/> STUDIES: CPT | Performed by: PSYCHIATRY & NEUROLOGY

## 2020-08-07 NOTE — PROCEDURES
[facility]  [address], [address], [address]  p: [phone]  f: [phone]    Test Date:  2020    Patient: Estela Boyd : 1950 Physician: Dr. Gwen Parker   Sex: Female Height:  cm Ref Phys:    ID#: [id number] Weight:  lbs. Technician: Hever Holder           Patient History / Exam:  81year old female with a medical history of neck pain. Failed conservative management on exam: Pronator teres weakness on the left, Brachialis atrophy on the left, Weakness of the left deltoid     NCV & EMG Findings:  Evaluation of the left radial sensory nerve showed prolonged distal peak latency (4.0 ms). The right median sensory nerve showed no response (2nd Digit). The left median/ulnar (palm) comparison nerve showed prolonged distal peak latency (Median Palm, 2.8 ms) and abnormal peak latency difference (Median Palm-Ulnar Palm, 0.9 ms). All remaining nerves (as indicated in the following tables) were within normal limits. Left vs. Right side comparison data for the median motor nerve indicates abnormal L-R velocity difference (Elbow-Wrist, 40 m/s). The ulnar motor nerve indicates abnormal L-R amplitude difference (32.9 %) and abnormal L-R velocity difference (A Elbow-B Elbow, 69 m/s). Needle evaluation of the right abductor pollicis brevis and the left pronator teres muscles showed moderately increased spontaneous activity. The left triceps and the left low cervical paraspinal muscles showed slightly increased spontaneous activity. All remaining muscles (as indicated in the following table) showed no evidence of electrical instability. Impression:  Severe right carpal tunnel syndrome superimposed on a mid cervical and lower cervical left radiculopathy. Imaging recommended . Clinical correlation advised.            ___________________________  Ashley Salinas MD      Motor Nerve Conduction Studies     Stim Site NR Onset (ms) Norm Onset (ms) O-P Amp (mV) Norm O-P Amp Site1 Site2 Delta-0 (ms) Dist (cm) Stephen (m/s) Norm Stephen (m/s)   Left Median Motor (Abd Poll Brev)   Wrist    3.8 <4.2 6.1 >5 Elbow Wrist 2.5 15.0 60 >50   Elbow    6.3  3.8          Right Median Motor (Abd Poll Brev)   Wrist    4.0 <4.2 5.2 >5 Elbow Wrist 2.6 26.0 100 >50   Elbow    6.6  2.8          Left Ulnar Motor (Abd Dig Min)   Wrist    2.7 <4.2 4.7 >3 B Elbow Wrist 3.2 19.0 59 >53   B Elbow    5.9  6.7  A Elbow B Elbow 1.8 10.0 56 >53   A Elbow    7.7  8.0          Right Ulnar Motor (Abd Dig Min)   Wrist    3.2 <4.2 7.0 >3 B Elbow Wrist 3.1 19.0 61 >53   B Elbow    6.3  5.3  A Elbow B Elbow 0.8 10.0 125 >53   A Elbow    7.1  8.1                    Anti - Sensory Nerve Conduction Studies     Stim Site NR Peak (ms) Norm Peak (ms) P-T Amp (µV) Norm P-T Amp Site1 Site2 Delta-P (ms) Dist (cm) Stephen (m/s) Norm Stephen (m/s)   Left Radial Anti Sensory (Base 1st Digit)   Wrist    4.0 <3.1 10.0  Wrist Base 1st Digit 4.0 0.0         7.3  6.3          Right Radial Anti Sensory (Base 1st Digit)   Wrist    2.8 <3.1 7.4  Wrist Base 1st Digit 2.8 0.0             Ortho - Sensory Nerve Conduction Studies     Stim Site NR Peak (ms) Norm Peak (ms) P-T Amp (µV) Norm P-T Amp Site1 Site2 Delta-P (ms) Dist (cm) Stephen (m/s) Norm Stephen (m/s)   Left Median Ortho Sensory (Wrist)   2nd Digit    6.6  19.3  2nd Digit Wrist 6.6 0.0     Palm    2.8  19.0  Palm Wrist 2.8 0.0     Right Median Ortho Sensory (Wrist)   2nd Digit NR     2nd Digit Wrist  0.0     Palm    6.0  15.0  Palm Wrist 6.0 0.0     Left Ulnar Ortho Sensory (Wrist)   5th Digit    2.3  4.0  5th Digit Wrist 2.3 0.0     Palm    2.1  13.3  Palm Wrist 2.1 0.0     Right Ulnar Ortho Sensory (Wrist)   Palm    2.1  20.3  Palm Wrist 2.1 0.0               Sensory Comparison Studies     Stim Site NR Peak (ms) Norm Peak (ms) P-T Amp (µV) Site1 Site2 Delta-P (ms) Norm Delta (ms)   Left Median/Ulnar Palm Comparison (Wrist - 8cm)   Median Palm    2.8 <2.5 31.3 Median Palm Ulnar Palm 0.9 <0.3   Ulnar Palm    1.9 <2.5 14.8

## 2020-08-12 ENCOUNTER — HOSPITAL ENCOUNTER (OUTPATIENT)
Dept: MRI IMAGING | Age: 70
Discharge: HOME | End: 2020-08-12
Payer: MEDICARE

## 2020-08-12 PROCEDURE — 72141 MRI NECK SPINE W/O DYE: CPT | Performed by: PSYCHIATRY & NEUROLOGY

## 2020-09-02 DIAGNOSIS — M54.12 CERVICAL RADICULOPATHY: Primary | ICD-10-CM

## 2020-11-05 DIAGNOSIS — E03.9 ACQUIRED HYPOTHYROIDISM: ICD-10-CM

## 2020-11-05 RX ORDER — LEVOTHYROXINE SODIUM 100 UG/1
TABLET ORAL
Qty: 90 TAB | Refills: 3 | Status: SHIPPED | OUTPATIENT
Start: 2020-11-05 | End: 2021-08-29

## 2020-12-04 ENCOUNTER — TRANSCRIBE ORDER (OUTPATIENT)
Dept: SCHEDULING | Age: 70
End: 2020-12-04

## 2020-12-04 DIAGNOSIS — Z12.31 SCREENING MAMMOGRAM FOR HIGH-RISK PATIENT: Primary | ICD-10-CM

## 2021-02-03 ENCOUNTER — OFFICE VISIT (OUTPATIENT)
Dept: INTERNAL MEDICINE CLINIC | Age: 71
End: 2021-02-03
Payer: MEDICARE

## 2021-02-03 ENCOUNTER — HOSPITAL ENCOUNTER (OUTPATIENT)
Dept: LAB | Age: 71
Discharge: HOME | End: 2021-02-03
Payer: MEDICARE

## 2021-02-03 VITALS
SYSTOLIC BLOOD PRESSURE: 159 MMHG | OXYGEN SATURATION: 98 % | WEIGHT: 123 LBS | TEMPERATURE: 97.2 F | HEIGHT: 62 IN | HEART RATE: 94 BPM | DIASTOLIC BLOOD PRESSURE: 75 MMHG | RESPIRATION RATE: 18 BRPM | BODY MASS INDEX: 22.63 KG/M2

## 2021-02-03 DIAGNOSIS — F41.9 ANXIETY AND DEPRESSION: ICD-10-CM

## 2021-02-03 DIAGNOSIS — F32.A ANXIETY AND DEPRESSION: ICD-10-CM

## 2021-02-03 DIAGNOSIS — Z13.220 SCREENING FOR HYPERLIPIDEMIA: ICD-10-CM

## 2021-02-03 DIAGNOSIS — E55.9 VITAMIN D DEFICIENCY: ICD-10-CM

## 2021-02-03 DIAGNOSIS — E03.9 ACQUIRED HYPOTHYROIDISM: Primary | ICD-10-CM

## 2021-02-03 DIAGNOSIS — D53.9 MACROCYTIC ANEMIA: ICD-10-CM

## 2021-02-03 DIAGNOSIS — Z00.00 MEDICARE ANNUAL WELLNESS VISIT, SUBSEQUENT: ICD-10-CM

## 2021-02-03 PROCEDURE — G8399 PT W/DXA RESULTS DOCUMENT: HCPCS | Performed by: FAMILY MEDICINE

## 2021-02-03 PROCEDURE — 80061 LIPID PANEL: CPT

## 2021-02-03 PROCEDURE — G0463 HOSPITAL OUTPT CLINIC VISIT: HCPCS | Performed by: FAMILY MEDICINE

## 2021-02-03 PROCEDURE — 80053 COMPREHEN METABOLIC PANEL: CPT

## 2021-02-03 PROCEDURE — G9717 DOC PT DX DEP/BP F/U NT REQ: HCPCS | Performed by: FAMILY MEDICINE

## 2021-02-03 PROCEDURE — 3017F COLORECTAL CA SCREEN DOC REV: CPT | Performed by: FAMILY MEDICINE

## 2021-02-03 PROCEDURE — 85027 COMPLETE CBC AUTOMATED: CPT

## 2021-02-03 PROCEDURE — 82306 VITAMIN D 25 HYDROXY: CPT

## 2021-02-03 PROCEDURE — 99214 OFFICE O/P EST MOD 30 MIN: CPT | Performed by: FAMILY MEDICINE

## 2021-02-03 PROCEDURE — 1101F PT FALLS ASSESS-DOCD LE1/YR: CPT | Performed by: FAMILY MEDICINE

## 2021-02-03 PROCEDURE — G8420 CALC BMI NORM PARAMETERS: HCPCS | Performed by: FAMILY MEDICINE

## 2021-02-03 PROCEDURE — G8427 DOCREV CUR MEDS BY ELIG CLIN: HCPCS | Performed by: FAMILY MEDICINE

## 2021-02-03 PROCEDURE — G9899 SCRN MAM PERF RSLTS DOC: HCPCS | Performed by: FAMILY MEDICINE

## 2021-02-03 PROCEDURE — G8536 NO DOC ELDER MAL SCRN: HCPCS | Performed by: FAMILY MEDICINE

## 2021-02-03 PROCEDURE — 84443 ASSAY THYROID STIM HORMONE: CPT

## 2021-02-03 PROCEDURE — G0439 PPPS, SUBSEQ VISIT: HCPCS | Performed by: FAMILY MEDICINE

## 2021-02-03 PROCEDURE — 1090F PRES/ABSN URINE INCON ASSESS: CPT | Performed by: FAMILY MEDICINE

## 2021-02-03 PROCEDURE — 36415 COLL VENOUS BLD VENIPUNCTURE: CPT

## 2021-02-03 RX ORDER — CYCLOBENZAPRINE HCL 5 MG
TABLET ORAL
COMMUNITY
Start: 2021-01-20 | End: 2021-05-28

## 2021-02-03 NOTE — PROGRESS NOTES
Bridger Connelly is a 78 y.o. female who presents for follow-up. S/p cervical fusion for cervical stenosis. 2021. Dr Palak Morel. She reports that she is improving. Still some pain. Reports neuro symptoms improving. Wearing c-collar. Started celebrex 200mg once a day, first dose today. Treated for hypothyroidism. Prescribed levothyroxine 100 mcg daily. She reports that she has not been consistent with thyroid medication. She thinks she is taking the medication about 5 days a week. Last TSH in  was 21.3. Sees Dr. Zana Ochoa.  . Colonoscopy 2018. Takes kaopectate prn. Reports not smoking, using nicorette gum.       Treated for anxiety and depression  using lorazepam and seroquel for sleep. on prozac 60mg once a day. Sees psych NP. Past Medical History:   Diagnosis Date    Anemia     Anxiety     Breast cancer ([facility] [address] 2005    right lumpectomy    Chronic pain     neck pain    Cough     Depression     Hemorrhoid     Menopause 2005    Muscle pain     Neck problem     Pneumonia     sepis    S/P radiation therapy     Sleep trouble     Thyroid disease     Urine troubles     unable to controll       Family History   Problem Relation Age of Onset    Cancer Mother        Social History     Socioeconomic History    Marital status:      Spouse name: Not on file    Number of children: Not on file    Years of education: Not on file    Highest education level: Not on file   Occupational History    Not on file   Social Needs    Financial resource strain: Not on file    Food insecurity     Worry: Not on file     Inability: Not on file    Transportation needs     Medical: Not on file     Non-medical: Not on file   Tobacco Use    Smoking status: Light Tobacco Smoker     Last attempt to quit: 2010     Years since quittin.0    Smokeless tobacco: Current User   Substance and Sexual Activity    Alcohol use:  Yes     Alcohol/week: 14.0 standard drinks     Types: 14 Shots of liquor per week     Frequency: 4 or more times a week     Drinks per session: 10 or more    Drug use: No    Sexual activity: Yes     Partners: Male     Birth control/protection: None   Lifestyle    Physical activity     Days per week: Not on file     Minutes per session: Not on file    Stress: Not on file   Relationships    Social connections     Talks on phone: Not on file     Gets together: Not on file     Attends [employer] service: Not on file     Active member of club or organization: Not on file     Attends meetings of clubs or organizations: Not on file     Relationship status: Not on file    Intimate partner violence     Fear of current or ex partner: Not on file     Emotionally abused: Not on file     Physically abused: Not on file     Forced sexual activity: Not on file   Other Topics Concern    Not on file   Social History Narrative    Not on file       Current Outpatient Medications on File Prior to Visit   Medication Sig Dispense Refill    cyclobenzaprine (FLEXERIL) 5 mg tablet TAKE 2 TABLETS 3 TIMES A DAY AS NEEDED FOR MUSCLE SPASMS      levothyroxine (SYNTHROID) 100 mcg tablet TAKE 1 TABLET EVERY DAY BEFORE BREAKFAST 90 Tab 3    FLUoxetine (PROzac) 20 mg capsule Take 3 Caps by mouth daily. 90 Cap 0    QUEtiapine (SEROqueL) 25 mg tablet Take 25-50 mg by mouth nightly. For sleep      dextroamphetamine (DEXTROSTAT) 10 mg tab Take 10 mg by mouth three (3) times daily. 0    LORazepam (ATIVAN) 1 mg tablet TAKE 1 TABLET BY MOUTH TWICE A DAY AS NEEDED (Patient taking differently: 0.5-1 mg. For help sleeping) 60 Tab 1    [DISCONTINUED] gabapentin (NEURONTIN) 300 mg capsule Take 300 mg by mouth four (4) times daily. Take 1 capsule by mouth three times a day.  [DISCONTINUED] tiZANidine (ZANAFLEX) 2 mg tablet Take 1 Tab by mouth three (3) times daily. As needed muscle spasm. 30 Tab 1    therapeutic multivitamin (THERAGRAN) tablet Take 1 Tab by mouth daily.  30 Tab 2    [DISCONTINUED] clonazePAM (KlonoPIN) 0.5 mg tablet Take 1 Tab by mouth two (2) times a day. Max Daily Amount: 1 mg. 10 Tab 0    [DISCONTINUED] busPIRone (BUSPAR) 5 mg tablet Take 1 Tab by mouth three (3) times daily. 90 Tab 0    [DISCONTINUED] thiamine HCL (B-1) 100 mg tablet Take 1 Tab by mouth daily. 30 Tab 2    [DISCONTINUED] folic acid (FOLVITE) 1 mg tablet Take 1 Tab by mouth daily. 30 Tab 2    [DISCONTINUED] albuterol (PROVENTIL HFA, VENTOLIN HFA, PROAIR HFA) 90 mcg/actuation inhaler INHALE 1 2 PUFFS BY MOUTH EVERY 6 HOURS AS NEEDED FOR WHEEZING      [DISCONTINUED] albuterol sulfate 90 mcg/actuation aepb Take 1-2 Puffs by inhalation every six (6) hours as needed for Wheezing. 1 Inhaler 0    [DISCONTINUED] permethrin (ACTICIN) 5 % topical cream permethrin 5 % topical cream   APPLY (THOROUGHLY MASSAGE INTO SKIN FROM HEAD TO SOLES OF FEET) BY TOPICAL ROUTE ONCE LEAVE ON FOR 8-14 HR, THEN REMOVE BY THOROUGH WASHING      [DISCONTINUED] promethazine (PHENERGAN) 25 mg tablet Take 1/2-1 tablet every 6 hours as needed for nausea. 30 Tab 2    [DISCONTINUED] ondansetron (ZOFRAN ODT) 4 mg disintegrating tablet Take 1 Tab by mouth every eight (8) hours as needed for Nausea. 30 Tab 2    [DISCONTINUED] colestipol (COLESTID) 1 gram tablet Take 1 Tab by mouth two (2) times a day. 60 Tab 3    [DISCONTINUED] naproxen sodium (ALEVE) 220 mg cap Take 220 mg by mouth every six (6) hours as needed for Pain. No current facility-administered medications on file prior to visit. Review of Systems  Pertinent items are noted in HPI. Objective:     Visit Vitals  BP (!) 159/75 (BP 1 Location: Right upper arm, BP Patient Position: Sitting, BP Cuff Size: Adult)   Pulse 94   Temp 97.2 °F (36.2 °C) (Temporal)   Resp 18   Ht 5' 2\" (1.575 m)   Wt 123 lb (55.8 kg)   SpO2 98%   BMI 22.50 kg/m²     Gen: well appearing female  HEENT:   PERRL,normal conjunctiva.  External ear and canals normal, TMs no opacification or erythema,  OP no erythema, no exudates, MMM  Neck: c-collar  Resp:  No wheezing, no rhonchi, no rales. CV:  RRR, normal S1S2   Extrem:  +2 pulses, trace edema, warm distally  Neuro: alert, 5/5 motor bilaterally. Hand tremor. Assessment/Plan:       ICD-10-CM ICD-9-CM    1. Acquired hypothyroidism  E46.5 035.2 METABOLIC PANEL, COMPREHENSIVE      CBC W/O DIFF      TSH 3RD GENERATION   2. Medicare annual wellness visit, subsequent  Z00.00 V70.0    3. Anxiety and depression  F41.9 300.00     F32.9 311    4. Vitamin D deficiency  E55.9 268.9 VITAMIN D, 25 HYDROXY   5. Macrocytic anemia  D53.9 281.9 CBC W/O DIFF   6. Screening for hyperlipidemia  Z13.220 V77.91 LIPID PANEL       Follow-up and Dispositions    · Return for follow up pending labs and 6 months.   Guy Barrera MD

## 2021-02-03 NOTE — PATIENT INSTRUCTIONS
Personalized Recommendations for Beryle Mako Here is a list of your current Health Maintenance items that are due (your personalized list of preventive services) Health Maintenance Due Topic Date Due  
 COVID-19 Vaccine (1 of 2) 09/24/1968  Shingles Vaccine (1 of 2) 09/24/2002  Glaucoma Screening   09/24/2017  Mammogram  10/28/2020 Keep a list of your medications (prescribed and over the counter) and bring it to every appointment. Taking your medications as prescribed is important for your health and safety. Use this sample medication list to create your own. Update the list whenever changes are made. Medication Dosage Frequency Indication Example: Aspirin 81 mg Once daily in the morning Heart Health How to stay healthy between visits The best way to live healthy is to have a healthy lifestyle by eating a well-balanced diet, exercising regularly, limiting alcohol and stopping smoking if you are a smoker. Try to exercise at least 30 minutes a day, this is better than any prescription medicine to keep you healthy. Eat at least 5 servings of fruits and vegetables every daily and reduce red meats, processed meat (such as deli cold cuts), white breads and pastas. Limit or eliminate sweets and sugary drinks from your diet. Try to keep your weight healthy. Your body mass index (BMI) should be between 18 and 25. If it is between 22 and 30 you are overweight and if it is 30 or higher, that is called obesity. Being overweight or obese increases risk of high blood pressure, arthritis, sleep apnea, diabetes and many cancers. High blood pressure causes damage to your blood vessels, heart, kidneys and other organs if untreated. Your blood pressure should be under 140/90 with an ideal level of 120/80 or less to prevent heart disease, strokes and kidney disease. Wear your seat belt every time you are in a vehicle. Use sunscreen or cover your skin when you are outdoors. 1 in 61 people will develop melanoma (skin cancer) which is mostly preventable. Smoking makes all health problems worse. If you smoke, talk to your provider about stop-smoking programs and medicines. See a dentist one or two times a year for checkups and to have your teeth cleaned. Annual eye exams are recommended to screen for glaucoma and cataracts. Immunizations  Additional Information Everyone should have a yearly flu shot and a Tetanus, Diphtheria & Pertussis (TDaP) vaccine every 10 years. The shingles vaccine called Shingrix is recommended once in a lifetime after age 48. This is given as a 2-dose series and you can get it at your local pharmacy. Shingrix is now recommended instead of the older Zostavax as it is 90% effective compared to 50% for the Zostavax. You can get the Shingrix vaccine even if you had the Zostavax as long as one year has passed. All people over age 72 should have a pneumonia vaccine to prevent pneumonia. This is called Pneumovax-23 and is once in a lifetime vaccines except in special cases. Some people may benefit from a different vaccine called Prevnar-13 in addition to 1 [place]. Screening Tests  Additional Information Screening for diabetes mellitus with a blood sugar test should be done annually if you have risk factors such as high blood pressure, high cholesterol, are overweight, have a family history of diabetes or you have had high blood sugars in the past, especially during pregnancy (gestational diabetes). Cholesterol tests check for elevated lipids (fatty part of blood) which can lead to heart disease and strokes are recommended every 5 years after age 39. If you have elevated cholesterol, diabetes or have had a heart attack or stroke you should have testing more frequently. Screening for cervical cancer with a pap smear and pelvic exam is recommended for women every 3-5 years from age 24 until age 72 if previous Pap smears have been normal. If you have had abnormal pap smears or increased risk for cervical cancer then testing may be recommended more frequently. Breast cancer screening with a mammogram is recommended every 2 years for women age 54-69. More frequent screening has not been shown to reduce the risk of dying from breast cancer and increases the chance you might need a biopsy or get treatment for a breast cancer that would not have harmed you. If you are over 75, mammograms may do more harm than good and there have been no studies showing that screening in this age group reduces the chance of death. Screening between age 36 and 48 does reduce the risk for breast cancer death but is not as effective as it is over age 48 and can lead to more biopsies and false positive tests. Colon cancer screening that evaluates for blood or polyps in your colon can prevent colon cancer and should be done yearly as a stool test or every 10 years as a colonoscopy up to age 76. Screening can be done up to age 80 if you are still very healthy but has higher risks after age 76. Colonoscopies may be recommended more frequently if precancerous lesions were found. A bone density test to screen for osteoporosis (thin or brittle bones) should be done at age 72 and periodically after that depending on the results and your history. Medicare will cover this up to every 2 years. Abdominal Aortic Aneurysm (AAA) screening at 72 is recommended if you have a family history of AAA. Lung Cancer Screening with an annual low-dose CT scan is recommended if you are between age 54 and 68, smoked at least 30 pack years (the equivalent of 1 pack per day for 30 years), and are a current smoker or quit less than 15 years ago. Hepatitis C screening is recommended one time for everyone between 18-79. HIV and syphilis screening are recommended if you are risk. Medicare Wellness Visit, Female The best way to live healthy is to have a lifestyle where you eat a well-balanced diet, exercise regularly, limit alcohol use, and quit all forms of tobacco/nicotine, if applicable. Regular preventive services are another way to keep healthy. Preventive services (vaccines, screening tests, monitoring & exams) can help personalize your care plan, which helps you manage your own care. Screening tests can find health problems at the earliest stages, when they are easiest to treat. [facility] follows the current, evidence-based guidelines published by the [facility] [place] ([facility]) when recommending preventive services for our patients. Because we follow these guidelines, sometimes recommendations change over time as research supports it. (For example, mammograms used to be recommended annually. Even though Medicare will still pay for an annual mammogram, the newer guidelines recommend a mammogram every two years for women of average risk). Of course, you and your doctor may decide to screen more often for some diseases, based on your risk and your co-morbidities (chronic disease you are already diagnosed with). Preventive services for you include: - Medicare offers their members a free annual wellness visit, which is time for you and your primary care provider to discuss and plan for your preventive service needs. Take advantage of this benefit every year! 
-All adults over the age of 72 should receive the recommended pneumonia vaccines. Current USPSTF guidelines recommend a series of two vaccines for the best pneumonia protection.  
-All adults should have a flu vaccine yearly and a tetanus vaccine every 10 years.  
-All adults age 48 and older should receive the shingles vaccines (series of two vaccines). -All adults age 38-68 who are overweight should have a diabetes screening test once every three years.  
-All adults born between 80 and 1963 should be screened once for Hepatitis C. 
-Other screening tests and preventive services for persons with diabetes include: an eye exam to screen for diabetic retinopathy, a kidney function test, a foot exam, and stricter control over your cholesterol.  
-Cardiovascular screening for adults with routine risk involves an electrocardiogram (ECG) at intervals determined by your doctor.  
-Colorectal cancer screenings should be done for adults age 54-65 with no increased risk factors for colorectal cancer. There are a number of acceptable methods of screening for this type of cancer. Each test has its own benefits and drawbacks. Discuss with your doctor what is most appropriate for you during your annual wellness visit. The different tests include: colonoscopy (considered the best screening method), a fecal occult blood test, a fecal DNA test, and sigmoidoscopy. 
 
-A bone mass density test is recommended when a woman turns 65 to screen for osteoporosis. This test is only recommended one time, as a screening. Some providers will use this same test as a disease monitoring tool if you already have osteoporosis. -Breast cancer screenings are recommended every other year for women of normal risk, age 54-69. 
-Cervical cancer screenings for women over age 72 are only recommended with certain risk factors. Here is a list of your current Health Maintenance items (your personalized list of preventive services) with a due date: 
Health Maintenance Due Topic Date Due  
 COVID-19 Vaccine (1 of 2) 09/24/1968  Shingles Vaccine (1 of 2) 09/24/2002  Glaucoma Screening   09/24/2017  Mammogram  10/28/2020

## 2021-02-03 NOTE — PROGRESS NOTES
This is the Subsequent Medicare Annual Wellness Exam, performed 12 months or more after the Initial AWV or the last Subsequent AWV    I have reviewed the patient's medical history in detail and updated the computerized patient record. Depression Risk Factor Screening:     3 most recent PHQ Screens 3/13/2019   PHQ Not Done -   Little interest or pleasure in doing things Nearly every day   Feeling down, depressed, irritable, or hopeless Nearly every day   Total Score PHQ 2 6       Alcohol Risk Screen    Do you average more than 1 drink per night or more than 7 drinks a week:  No    On any one occasion in the past three months have you have had more than 3 drinks containing alcohol:  No        Functional Ability and Level of Safety:    Hearing: Hearing is good. Activities of Daily Living: The home contains: wearing c-spine collar  Patient needs help with:  transportation      Ambulation: with no difficulty     Fall Risk:  Fall Risk Assessment, last 12 mths 2/3/2021   Able to walk? Yes   Fall in past 12 months? 1   Do you feel unsteady? 0   Are you worried about falling 0   Number of falls in past 12 months -   Fall with injury? -      Abuse Screen:  Patient is not abused       Cognitive Screening    Has your family/caregiver stated any concerns about your memory: no     Cognitive Screening: Normal - Verbal Fluency Test    Assessment/Plan   Education and counseling provided:  Are appropriate based on today's review and evaluation    Diagnoses and all orders for this visit:    1. Acquired hypothyroidism  -     METABOLIC PANEL, COMPREHENSIVE  -     CBC W/O DIFF  -     TSH 3RD GENERATION    2. Medicare annual wellness visit, subsequent    3. Anxiety and depression    4. Vitamin D deficiency  -     VITAMIN D, 25 HYDROXY    5. Macrocytic anemia  -     CBC W/O DIFF    6. Screening for hyperlipidemia  -     LIPID PANEL;  Future        Health Maintenance Due     Health Maintenance Due   Topic Date Due    COVID-19 Vaccine (1 of 2) 1968    Shingrix Vaccine Age 50> (1 of 2) 2002    GLAUCOMA SCREENING Q2Y  2017    Breast Cancer Screen Mammogram  10/28/2020       Patient Care Team   Patient Care Team:  Saniya Goldberg MD as PCP - General (Internal Medicine)  Saniya Goldberg MD as PCP - [facility] Empaneled Provider  Spencer Fall MD as Consulting Provider (Gastroenterology)    History     Patient Active Problem List   Diagnosis Code    Insomnia G47.00    Hypothyroidism E03.9    Anxiety and depression F41.9, F32.9    Thyroid activity decreased E03.9    Cervical disc disease M50.90    Family history of melanoma Z80.8    Wheezing R06.2    Influenza A J10.1    Acute respiratory failure with hypoxia (HCC) J96.01    SOB (shortness of breath) R06.02     Past Medical History:   Diagnosis Date    Anemia     Anxiety     Breast cancer ([address] 2005    right lumpectomy    Chronic pain     neck pain    Cough     Depression     Hemorrhoid     Menopause 2005    Muscle pain     Neck problem     Pneumonia     sepis    S/P radiation therapy 2005    Sleep trouble     Thyroid disease     Urine troubles     unable to controll      Past Surgical History:   Procedure Laterality Date    HX BREAST LUMPECTOMY Right 2005    HX  SECTION      HX THYROIDECTOMY      IMPLANT BREAST SILICONE/EQ Bilateral 4023     Current Outpatient Medications   Medication Sig Dispense Refill    cyclobenzaprine (FLEXERIL) 5 mg tablet TAKE 2 TABLETS 3 TIMES A DAY AS NEEDED FOR MUSCLE SPASMS      levothyroxine (SYNTHROID) 100 mcg tablet TAKE 1 TABLET EVERY DAY BEFORE BREAKFAST 90 Tab 3    FLUoxetine (PROzac) 20 mg capsule Take 3 Caps by mouth daily. 90 Cap 0    QUEtiapine (SEROqueL) 25 mg tablet Take 25-50 mg by mouth nightly. For sleep      dextroamphetamine (DEXTROSTAT) 10 mg tab Take 10 mg by mouth three (3) times daily.   0    LORazepam (ATIVAN) 1 mg tablet TAKE 1 TABLET BY MOUTH TWICE A DAY AS NEEDED (Patient taking differently: 0.5-1 mg. For help sleeping) 60 Tab 1    therapeutic multivitamin (THERAGRAN) tablet Take 1 Tab by mouth daily. 30 Tab 2     No Known Allergies    Family History   Problem Relation Age of Onset    Cancer Mother      Social History     Tobacco Use    Smoking status: Light Tobacco Smoker     Last attempt to quit: 2010     Years since quittin.0    Smokeless tobacco: Current User   Substance Use Topics    Alcohol use: Yes     Alcohol/week: 14.0 standard drinks     Types: 14 Shots of liquor per week     Frequency: 4 or more times a week     Drinks per session: 10 or more     Abhinav Buchanan, 78 y.o. female, presents for No chief complaint on file. [facility]       OBJECTIVE     Visit Vitals  BP (!) 159/75 (BP 1 Location: Right upper arm, BP Patient Position: Sitting, BP Cuff Size: Adult)   Pulse 94   Temp 97.2 °F (36.2 °C) (Temporal)   Resp 18   Ht 5' 2\" (1.575 m)   Wt 123 lb (55.8 kg)   SpO2 98%   BMI 22.50 kg/m²      BP Readings from Last 3 Encounters:   21 (!) 159/75   20 124/78   20 120/84      Wt Readings from Last 3 Encounters:   21 123 lb (55.8 kg)   20 117 lb (53.1 kg)   20 117 lb (53.1 kg)     Physical Exam      ASSESSMENT AND PLAN     Diagnoses and all orders for this visit:    1. Acquired hypothyroidism  -     METABOLIC PANEL, COMPREHENSIVE  -     CBC W/O DIFF  -     TSH 3RD GENERATION    2. Medicare annual wellness visit, subsequent    3. Anxiety and depression    4. Vitamin D deficiency  -     VITAMIN D, 25 HYDROXY    5. Macrocytic anemia  -     CBC W/O DIFF    6. Screening for hyperlipidemia  -     LIPID PANEL;  Future        WELLNESS EVALUATION & HEALTH RISK ASSESSMENT     DEPRESSION SCREENING  3 most recent PHQ Screens 3/13/2019   PHQ Not Done -   Little interest or pleasure in doing things Nearly every day   Feeling down, depressed, irritable, or hopeless Nearly every day   Total Score PHQ 2 [address] Suicide Severity Rating Scale 2/22/2020 6/6/2020 6/12/2020   1) Within the past month, have you wished you were dead or wished you could go to sleep and not wake up? No No No   2) Have you actually had any thoughts of killing yourself? No No No   6) Have you ever done anything, started to do anything, or prepared to do anything to end your life? No No No       FALL RISK SCREENING  Fall Risk Assessment, last 12 mths 2/3/2021   Able to walk? Yes   Fall in past 12 months? 1   Do you feel unsteady? 0   Are you worried about falling 0   Number of falls in past 12 months -   Fall with injury? -       [facility]       HEARING/VISION/SKIN       SLEEP/MEMORY/ANXIETY       SUBSTANCE AND OPIOID USE       SAFETY       ADLS       PHYSICAL ACTIVITY        TIMED UP AND GO TEST (If indicated)       MINI-COG SCORE (If indicated)       STOP-BANG SCORE (If indicated)         [address] [facility] Maintenance Topics with due status: Overdue       Topic Date Due    COVID-19 Vaccine 09/[birth year]/1968    Shingrix Vaccine Age 50> 09/24/2002    GLAUCOMA SCREENING Q2Y 09/24/2017    Breast Cancer Screen Mammogram 10/28/2020     Health Maintenance Topics with due status: Not Due       Topic Last Completion Date    DTaP/Tdap/Td series 10/21/2019    Lipid Screen 02/23/2020    Colorectal Cancer Screening Combo 06/14/2020    Medicare Yearly Exam 02/03/2021     Health Maintenance Topics with due status: Completed       Topic Last Completion Date    Pneumococcal 65+ years 10/10/2019    Bone Densitometry (Dexa) Screening 10/30/2019    Hepatitis C Screening 06/13/2020    Flu Vaccine 10/15/2020         Colon cancer:  Recommendation: Colonoscopy every 10y or annual FIT test from 50-75 or every 3 year stool DNA based test with consideration of ongoing screening from 76-85.     Lung cancer (LDCT): Recommendation: Yearly LDCT for pts 55-77 w 30-pack year hx and currently smoke or quit <15 yr ago. Hepatitis C:  Recommendation: One time screening for all patient's aged 18-79. Diabetes:   Recommendation: USPSTF recommends screening ages 38-67 y/o if overweight or obese. Medicare covers screening in those patients who are overweight, obese, have HTN or dyslipiemia, a personal history of gestational diabetes or prior elevated blood sugar, a family hx of DM, or any patient over age 72    Lipids:   Recommendation: screening for hyperlipidemia every 5 years after age 39    PREVENTIVE CARE - FEMALE SCREENINGS     Cervical cancer: Recommendation: Every 3 yr from 21-29 and every 5 yr from 33-67, with Pap and HPV testing. Breast cancer: Recommendation:  USPSTF recommends screening mammography every 2 yr from 54-69. The decision to start screening mammography in women prior to age 48 years should be an individual one. Women who place a higher value on the potential benefit than the potential harms may choose to begin biennial screening between the ages of 36 and 52 years. Medicare covers annual screening mammography and USPSTF also recommends women with a personal or family history of breast, ovarian, tubal, or peritoneal cancer or who have an ancestry ([address]) associated with breast cancer susceptibility 1 and 2 (BRCA1/2) gene mutations with an appropriate brief familial risk assessment tool.  Women with a positive result on the risk assessment tool should receive genetic counseling and, if indicated after counseling, genetic testing    Osteoporosis: Recommendation: Screen all women at 72, earlier if elevated risk    AAA:   Recommendation: One-time screening if family history of AAA    IMMUNIZATIONS     Immunization History   Administered Date(s) Administered    Influenza High Dose Vaccine PF 09/08/2018, 10/10/2019    Influenza Vaccine 10/07/2015, 09/08/2018    Pneumococcal Conjugate (PCV-13) 10/01/2017    Pneumococcal Polysaccharide (PPSV-23) 10/10/2019    Tdap 10/21/2019       Pneumovax:   Recommendation: PPSV23 once for all >65 and high risk <65     Prevnar:   Recommendation: PCV13 only if >65 and immunocompromised or residing in a nursing home, or in areas of low childhood Pneumococcal vaccination    Influenza:   Recommendation: Vaccination annually, high dose if 65 or older    Shingrix:  Recommendation: Vaccination 2 shots 2-6 months apart for all age >47    TDaP:    Recommendation: Vaccination Booster with TDaP every 10 yr.      INDIVIDUALIZED SCREENING, EDUCATION, AND PLAN     The patient and any caregiver(s) were counseled on: Healthcare maintenance and preventive items as above. The patient is not on high risk medication(s) including benzodiaepines. Based on my evaluation of the patient and the Health Risk Assessment performed today, there is not evidence of cognitive impairment. Medications, allergies, problem list, previous encounters, recent results, medical, social and family history reviewed in the electronic health record. Medications and problems are viewable in the Encounter tab for this visit. Current Outpatient Medications   Medication Sig    cyclobenzaprine (FLEXERIL) 5 mg tablet TAKE 2 TABLETS 3 TIMES A DAY AS NEEDED FOR MUSCLE SPASMS    levothyroxine (SYNTHROID) 100 mcg tablet TAKE 1 TABLET EVERY DAY BEFORE BREAKFAST    FLUoxetine (PROzac) 20 mg capsule Take 3 Caps by mouth daily.  QUEtiapine (SEROqueL) 25 mg tablet Take 25-50 mg by mouth nightly. For sleep    dextroamphetamine (DEXTROSTAT) 10 mg tab Take 10 mg by mouth three (3) times daily.  LORazepam (ATIVAN) 1 mg tablet TAKE 1 TABLET BY MOUTH TWICE A DAY AS NEEDED (Patient taking differently: 0.5-1 mg. For help sleeping)    therapeutic multivitamin (THERAGRAN) tablet Take 1 Tab by mouth daily. No current facility-administered medications for this visit.       Medications Discontinued During This Encounter   Medication Reason    albuterol (PROVENTIL HFA, VENTOLIN HFA, PROAIR HFA) 90 mcg/actuation inhaler Not A Current Medication    permethrin (ACTICIN) 5 % topical cream Not A Current Medication    clonazePAM (KlonoPIN) 0.5 mg tablet Not A Current Medication    colestipol (COLESTID) 1 gram tablet Not A Current Medication    folic acid (FOLVITE) 1 mg tablet Not A Current Medication    thiamine HCL (B-1) 100 mg tablet Not A Current Medication    tiZANidine (ZANAFLEX) 2 mg tablet Not A Current Medication    albuterol sulfate 90 mcg/actuation aepb Not A Current Medication    gabapentin (NEURONTIN) 300 mg capsule Therapy Completed    naproxen sodium (ALEVE) 220 mg cap Therapy Completed    ondansetron (ZOFRAN ODT) 4 mg disintegrating tablet Therapy Completed    promethazine (PHENERGAN) 25 mg tablet Therapy Completed    busPIRone (BUSPAR) 5 mg tablet Therapy Completed     No Known Allergies  Past Medical History:   Diagnosis Date    Anemia     Anxiety     Breast cancer [address] [address] [address] 2005    right lumpectomy    Chronic pain     neck pain    Cough     Depression     Hemorrhoid     Menopause 2005    Muscle pain     Neck problem     Pneumonia     sepis    S/P radiation therapy 2005    Sleep trouble     Thyroid disease     Urine troubles     unable to controll     Past Surgical History:   Procedure Laterality Date    HX BREAST LUMPECTOMY Right 2005    HX  SECTION      HX THYROIDECTOMY      IMPLANT BREAST SILICONE/EQ Bilateral 5520     Family History   Problem Relation Age of Onset    Cancer Mother      Social History     Socioeconomic History    Marital status:      Spouse name: Not on file    Number of children: Not on file    Years of education: Not on file    Highest education level: Not on file   Tobacco Use    Smoking status: Light Tobacco Smoker     Last attempt to quit: 2010     Years since quittin.0    Smokeless tobacco: Current User   Substance and Sexual Activity    Alcohol use: Yes     Alcohol/week: 14.0 standard drinks     Types: 14 Shots of liquor per week     Frequency: 4 or more times a week     Drinks per session: 10 or more    Drug use: No    Sexual activity: Yes     Partners: Male     Birth control/protection: None          Patient Care Team:  Narinder Grullon MD as PCP - General (Internal Medicine)  Narinder Grullon MD as PCP - [address] [place] [address] [facility]  Byron Gipson MD as Consulting Provider (Gastroenterology)    Follow-up and Dispositions    · Return for follow up pending labs and 6 months.   .       Future Appointments   Date Time Provider Lakeisha Espinal   8/5/2021  1:30 PM Narinder Grullon MD [facility] KENYATTA Ruiz MD, 2/3/2021  This encounter has been electronically signed

## 2021-02-04 LAB
25(OH)D3+25(OH)D2 SERPL-MCNC: 29.9 NG/ML (ref 30–100)
ALBUMIN SERPL-MCNC: 3.6 G/DL (ref 3.8–4.8)
ALBUMIN/GLOB SERPL: 0.8 {RATIO} (ref 1.2–2.2)
ALP SERPL-CCNC: 61 IU/L (ref 39–117)
ALT SERPL-CCNC: 5 IU/L (ref 0–32)
AST SERPL-CCNC: 16 IU/L (ref 0–40)
BILIRUB SERPL-MCNC: 0.3 MG/DL (ref 0–1.2)
BUN SERPL-MCNC: 17 MG/DL (ref 8–27)
BUN/CREAT SERPL: 17 (ref 12–28)
CALCIUM SERPL-MCNC: 10.1 MG/DL (ref 8.7–10.3)
CHLORIDE SERPL-SCNC: 99 MMOL/L (ref 96–106)
CHOLEST SERPL-MCNC: 199 MG/DL (ref 100–199)
CO2 SERPL-SCNC: 25 MMOL/L (ref 20–29)
CREAT SERPL-MCNC: 0.98 MG/DL (ref 0.57–1)
ERYTHROCYTE [DISTWIDTH] IN BLOOD BY AUTOMATED COUNT: 12.3 % (ref 11.7–15.4)
GLOBULIN SER CALC-MCNC: 4.5 G/DL (ref 1.5–4.5)
GLUCOSE SERPL-MCNC: 94 MG/DL (ref 65–99)
HCT VFR BLD AUTO: 33.4 % (ref 34–46.6)
HDLC SERPL-MCNC: 57 MG/DL
HGB BLD-MCNC: 11.1 G/DL (ref 11.1–15.9)
LDLC SERPL CALC-MCNC: 124 MG/DL (ref 0–99)
MCH RBC QN AUTO: 33.8 PG (ref 26.6–33)
MCHC RBC AUTO-ENTMCNC: 33.2 G/DL (ref 31.5–35.7)
MCV RBC AUTO: 102 FL (ref 79–97)
PLATELET # BLD AUTO: 356 X10E3/UL (ref 150–450)
POTASSIUM SERPL-SCNC: 4.5 MMOL/L (ref 3.5–5.2)
PROT SERPL-MCNC: 8.1 G/DL (ref 6–8.5)
RBC # BLD AUTO: 3.28 X10E6/UL (ref 3.77–5.28)
SODIUM SERPL-SCNC: 138 MMOL/L (ref 134–144)
TRIGL SERPL-MCNC: 99 MG/DL (ref 0–149)
TSH SERPL DL<=0.005 MIU/L-ACNC: 22.5 UIU/ML (ref 0.45–4.5)
VLDLC SERPL CALC-MCNC: 18 MG/DL (ref 5–40)
WBC # BLD AUTO: 8.1 X10E3/UL (ref 3.4–10.8)

## 2021-02-12 ENCOUNTER — TELEPHONE (OUTPATIENT)
Dept: INTERNAL MEDICINE CLINIC | Age: 71
End: 2021-02-12

## 2021-02-12 ENCOUNTER — PATIENT MESSAGE (OUTPATIENT)
Dept: INTERNAL MEDICINE CLINIC | Age: 71
End: 2021-02-12

## 2021-03-12 ENCOUNTER — TELEPHONE (OUTPATIENT)
Dept: INTERNAL MEDICINE CLINIC | Age: 71
End: 2021-03-12

## 2021-03-12 DIAGNOSIS — E03.9 ACQUIRED HYPOTHYROIDISM: Primary | ICD-10-CM

## 2021-03-22 ENCOUNTER — HOSPITAL ENCOUNTER (OUTPATIENT)
Dept: MAMMOGRAPHY | Age: 71
Discharge: HOME | End: 2021-03-22
Payer: MEDICARE

## 2021-03-22 DIAGNOSIS — Z12.31 SCREENING MAMMOGRAM FOR HIGH-RISK PATIENT: ICD-10-CM

## 2021-03-22 PROCEDURE — 77067 SCR MAMMO BI INCL CAD: CPT | Performed by: FAMILY MEDICINE

## 2021-03-23 ENCOUNTER — TRANSCRIBE ORDER (OUTPATIENT)
Dept: GENERAL RADIOLOGY | Age: 71
End: 2021-03-23

## 2021-03-23 DIAGNOSIS — R92.8 ABNORMAL MAMMOGRAM: Primary | ICD-10-CM

## 2021-03-29 ENCOUNTER — HOSPITAL ENCOUNTER (OUTPATIENT)
Dept: MAMMOGRAPHY | Age: 71
Discharge: HOME | End: 2021-03-29
Attending: FAMILY MEDICINE
Payer: MEDICARE

## 2021-03-29 DIAGNOSIS — R92.8 ABNORMAL MAMMOGRAM: ICD-10-CM

## 2021-03-29 PROCEDURE — 77061 BREAST TOMOSYNTHESIS UNI: CPT

## 2021-05-27 ENCOUNTER — TELEPHONE (OUTPATIENT)
Dept: INTERNAL MEDICINE CLINIC | Age: 71
End: 2021-05-27

## 2021-05-27 NOTE — TELEPHONE ENCOUNTER
Pt went to Er 2 nights ago for SOB. SPO2 in ER was in low 90s and had a cough. SPO2 right now is 85-88. Pt states SOB right now. Given Albuterol inhaler but does not seem to be doing much. Pt currently taking meds given by ER. Pt would like to know if she should go back to the ER for continuous SOB and low oxygen or what the next steps are. Advised pt will send to Dr. Chris العراقي for further guidance. Pt verbalizes understanding of the instructions and has no further questions at this time.

## 2021-05-27 NOTE — TELEPHONE ENCOUNTER
Called, spoke to pt  Received two pt identifiers  Pt informed of VV available at 315 today w/ Dr. Kari Stren. Pt declines. Pt states does not think she can wait that long and will just head to the ER. Pt verbalizes understanding of the instructions and has no further questions at this time.

## 2021-05-27 NOTE — TELEPHONE ENCOUNTER
Pt needs a call back to advise on plan of care:   Should she go to ER? Pt states in ER 2 nights ago and given O2, antibiotics, steroids and cough drops    States O2 was in mid 90s. States now her O2 is 87-88 and not improving. Pt was short of breath on the call.     Please call as soon as possible to advise   [phone]

## 2021-05-27 NOTE — TELEPHONE ENCOUNTER
Pt following up and asking for a request:    [facility] pt went to ER    States has pneumonia    [facility] ER gave rx for nebulizer. [facility] needs DR Clemente Conway to write rx for Oxygen. [facility] is back from ER and will be home if a VV appt is still offered to be worked in today.       [phone]

## 2021-05-27 NOTE — TELEPHONE ENCOUNTER
Pt offered VV appointment this afternoon with Dr. Alessandra Packer. Pt declined & decided she will go back to the ED as she's concerned. Advised we support this decision & call back if any f/u care needed. Patient given an opportunity to ask questions, repeated information, and verbalized understanding.

## 2021-05-28 ENCOUNTER — VIRTUAL VISIT (OUTPATIENT)
Dept: INTERNAL MEDICINE CLINIC | Age: 71
End: 2021-05-28
Payer: MEDICARE

## 2021-05-28 DIAGNOSIS — R79.81 LOW OXYGEN SATURATION: Primary | ICD-10-CM

## 2021-05-28 DIAGNOSIS — R06.02 SOB (SHORTNESS OF BREATH): ICD-10-CM

## 2021-05-28 PROCEDURE — G8399 PT W/DXA RESULTS DOCUMENT: HCPCS | Performed by: FAMILY MEDICINE

## 2021-05-28 PROCEDURE — 1090F PRES/ABSN URINE INCON ASSESS: CPT | Performed by: FAMILY MEDICINE

## 2021-05-28 PROCEDURE — G9717 DOC PT DX DEP/BP F/U NT REQ: HCPCS | Performed by: FAMILY MEDICINE

## 2021-05-28 PROCEDURE — G8420 CALC BMI NORM PARAMETERS: HCPCS | Performed by: FAMILY MEDICINE

## 2021-05-28 PROCEDURE — 1101F PT FALLS ASSESS-DOCD LE1/YR: CPT | Performed by: FAMILY MEDICINE

## 2021-05-28 PROCEDURE — 99213 OFFICE O/P EST LOW 20 MIN: CPT | Performed by: FAMILY MEDICINE

## 2021-05-28 PROCEDURE — G0463 HOSPITAL OUTPT CLINIC VISIT: HCPCS | Performed by: FAMILY MEDICINE

## 2021-05-28 PROCEDURE — G8536 NO DOC ELDER MAL SCRN: HCPCS | Performed by: FAMILY MEDICINE

## 2021-05-28 PROCEDURE — G9899 SCRN MAM PERF RSLTS DOC: HCPCS | Performed by: FAMILY MEDICINE

## 2021-05-28 PROCEDURE — G8427 DOCREV CUR MEDS BY ELIG CLIN: HCPCS | Performed by: FAMILY MEDICINE

## 2021-05-28 PROCEDURE — 3017F COLORECTAL CA SCREEN DOC REV: CPT | Performed by: FAMILY MEDICINE

## 2021-05-28 RX ORDER — AZITHROMYCIN 250 MG/1
250 TABLET, FILM COATED ORAL DAILY
COMMUNITY
Start: 2021-05-27 | End: 2021-08-05

## 2021-05-28 RX ORDER — IPRATROPIUM BROMIDE AND ALBUTEROL SULFATE 2.5; .5 MG/3ML; MG/3ML
3 SOLUTION RESPIRATORY (INHALATION)
COMMUNITY

## 2021-05-28 NOTE — PROGRESS NOTES
Alicia Amezcua is a 78 y.o. female who presents for follow-up after ED visit. The patient states she has been seen at [facility] twice for shortness of breath. She states she was put on oxygen in the ED and she feels she requires a prescription for oxygen at home. In ED SpO2 ws 90% after walking. Reports SpO2 is 88% at rest, low 80% with exertion. In the ED, she states she had a chest x-ray and was told she has pneumonia. She received steroids and nebulizer treatments. She is on Zithromax. At home she is using a nebulizer and continues on steroids. This is an established visit conducted via telemedicine with video. The patient has been instructed that this meets HIPAA criteria and acknowledges and agrees to this method of visitation. Pursuant to the emergency declaration under the [address] [place] [id number], [facility] waiver authority and the [employer] and [employer] Act, this Virtual Visit was conducted, with patient's consent, to reduce the patient's risk of exposure to COVID-19 and provide continuity of care for an established patient. Services were provided through a video synchronous discussion virtually to substitute for in-person clinic visit.         Past Medical History:   Diagnosis Date    Anemia     Anxiety     Breast cancer ([address] 2005    right lumpectomy    Chronic pain     neck pain    Cough     Depression     Hemorrhoid     Menopause 2005    Muscle pain     Neck problem     Pneumonia     sepis    Pneumonia 0528/2021    S/P radiation therapy 2005    right breast    Sleep trouble     Thyroid disease     Urine troubles     unable to controll       Family History   Problem Relation Age of Onset    Cancer Mother        Social History     Socioeconomic History    Marital status:      Spouse name: Not on file    Number of children: Not on file    Years of education: Not on file    Highest education level: Not on file   Occupational History    Not on file   Tobacco Use    Smoking status: Former Smoker     Quit date: 2010     Years since quittin.4    Smokeless tobacco: Current User   Substance and Sexual Activity    Alcohol use: Yes     Alcohol/week: 14.0 standard drinks     Types: 14 Shots of liquor per week    Drug use: No    Sexual activity: Yes     Partners: Male     Birth control/protection: None   Other Topics Concern    Not on file   Social History Narrative    Not on file     Social Determinants of Health     Financial Resource Strain:     Difficulty of Paying Living Expenses:    Food Insecurity:     Worried About Running Out of Food in the Last Year:     [address] in the Last Year:    Transportation Needs:     Lack of Transportation (Medical):  Lack of Transportation (Non-Medical):    Physical Activity:     Days of Exercise per Week:     Minutes of Exercise per Session:    Stress:     Feeling of Stress :    Social Connections:     Frequency of Communication with Friends and Family:     Frequency of Social Gatherings with Friends and Family:     Attends Orthodoxy Services:     Active Member of Clubs or Organizations:     Attends Club or Organization Meetings:     Marital Status:    Intimate Partner Violence:     Fear of Current or Ex-Partner:     Emotionally Abused:     Physically Abused:     Sexually Abused:        Current Outpatient Medications on File Prior to Visit   Medication Sig Dispense Refill    azithromycin (ZITHROMAX) 250 mg tablet Take 250 mg by mouth daily.  nebulizer and compressor (NEBULIZER W/COMPRESSOR) by Does Not Apply route.  albuterol-ipratropium (DUO-NEB) 2.5 mg-0.5 mg/3 ml nebu 3 mL by Nebulization route every four (4) hours as needed for Wheezing.  hydrocodone-guaifenesin 2.5-200 mg/5 mL soln Take  by mouth.       levothyroxine (SYNTHROID) 100 mcg tablet TAKE 1 TABLET EVERY DAY BEFORE BREAKFAST 90 Tab 3    FLUoxetine (PROzac) 20 mg capsule Take 3 Caps by mouth daily. 90 Cap 0    therapeutic multivitamin (THERAGRAN) tablet Take 1 Tab by mouth daily. 30 Tab 2    QUEtiapine (SEROqueL) 25 mg tablet Take 25-50 mg by mouth nightly. For sleep      dextroamphetamine (DEXTROSTAT) 10 mg tab Take 10 mg by mouth three (3) times daily. 0    LORazepam (ATIVAN) 1 mg tablet TAKE 1 TABLET BY MOUTH TWICE A DAY AS NEEDED (Patient taking differently: 0.5-1 mg. For help sleeping) 60 Tab 1    [DISCONTINUED] cyclobenzaprine (FLEXERIL) 5 mg tablet TAKE 2 TABLETS 3 TIMES A DAY AS NEEDED FOR MUSCLE SPASMS (Patient not taking: Reported on 5/28/2021)       No current facility-administered medications on file prior to visit. Review of Systems  Pertinent items are noted in HPI. Objective:     Gen: Mildly ill appearing female, no shortness of breath at rest  HEENT: normal conjunctiva, no audible congestion, patient does not see oral erythema, has MMM  Neck: patient does not feel enlarged or tender LAD or masses  Resp: normal respiratory effort, no audible wheezing. CV: patient does not feel palpitations or heart irregularity  Neuro: Alert and oriented, able to answer questions without difficulty, clear speech      Assessment/Plan:       ICD-10-CM ICD-9-CM    1. Low oxygen saturation  R79.81 790.91    2. SOB (shortness of breath)  R06.02 786.05    The patient states she was diagnosed with pneumonia at [facility]. She is reporting low oxygen saturation. She states she is already on steroids, nebulizer, and Z-Ronni. Patient is advised that prescribing oxygen to her at home would not be appropriate and she is strongly advised to return to the emergency room today for further evaluation as she may require hospitalization if she has pneumonia and hypoxia. This was a telemedicine visit with video.         Aidee Leo MD

## 2021-07-08 LAB — TSH SERPL DL<=0.005 MIU/L-ACNC: 2.75 UIU/ML (ref 0.45–4.5)

## 2021-07-18 ENCOUNTER — PATIENT MESSAGE (OUTPATIENT)
Dept: INTERNAL MEDICINE CLINIC | Age: 71
End: 2021-07-18

## 2021-08-05 ENCOUNTER — TELEPHONE (OUTPATIENT)
Dept: INTERNAL MEDICINE CLINIC | Age: 71
End: 2021-08-05

## 2021-08-05 ENCOUNTER — VIRTUAL VISIT (OUTPATIENT)
Dept: INTERNAL MEDICINE CLINIC | Age: 71
End: 2021-08-05
Payer: MEDICARE

## 2021-08-05 DIAGNOSIS — R03.0 ELEVATED BLOOD PRESSURE READING: ICD-10-CM

## 2021-08-05 DIAGNOSIS — R60.9 EDEMA, UNSPECIFIED TYPE: Primary | ICD-10-CM

## 2021-08-05 PROCEDURE — 99442 PR PHYS/QHP TELEPHONE EVALUATION 11-20 MIN: CPT | Performed by: FAMILY MEDICINE

## 2021-08-05 RX ORDER — TRIAMTERENE AND HYDROCHLOROTHIAZIDE 37.5; 25 MG/1; MG/1
CAPSULE ORAL
Qty: 15 CAPSULE | Refills: 0 | Status: SHIPPED | OUTPATIENT
Start: 2021-08-05 | End: 2021-08-28

## 2021-08-05 NOTE — PROGRESS NOTES
Eduardo Lindsey is a 78 y.o. female who presents for follow-up after ED visit. The patient was [facility] with concern of leg swelling. She had just taken a course of steroids prior to the onset of her symptoms. Had been hospitalized for pneumonia on 7/25. She had a venous ultrasound negative for DVT. Labs were performed. Today, she reports the swelling is better, but not gone. Denies STEWART, orthopnea, or PND. She reports her blood pressure has been elevated 150-160. She is not treated for HTN. Treated for hypothyroidism. Last TSH normal. Reports compliance with medication. This is an established visit conducted via telemedicine, by phone. The patient has been instructed that this meets HIPAA criteria and acknowledges and agrees to this method of visitation. Pursuant to the emergency declaration under the [facility], [facility] waiver authority and the [employer] Act, this Virtual Visit was conducted, with patient's consent, to reduce the patient's risk of exposure to COVID-19 and provide continuity of care for an established patient. Services were provided by phone to substitute for in-person clinic visit.        Past Medical History:   Diagnosis Date    Anemia     Anxiety     Breast cancer ([facility] [address] 2005    right lumpectomy    Chronic pain     neck pain    Cough     Depression     Hemorrhoid     Menopause 2005    Muscle pain     Neck problem     Pneumonia     sepis    Pneumonia 0528/2021    S/P radiation therapy 2005    right breast    Sleep trouble     Thyroid disease     Urine troubles     unable to controll       Family History   Problem Relation Age of Onset    Cancer Mother        Social History     Socioeconomic History    Marital status:      Spouse name: Not on file    Number of children: Not on file    Years of education: Not on file    Highest education level: Not on file   Occupational History    Not on file   Tobacco Use    Smoking status: Former Smoker     Quit date: 2010     Years since quittin.6    Smokeless tobacco: Current User   Substance and Sexual Activity    Alcohol use: Yes     Alcohol/week: 1.0 standard drinks     Types: 1 Standard drinks or equivalent per week     Comment: 1 drink a day     Drug use: No    Sexual activity: Yes     Partners: Male     Birth control/protection: None   Other Topics Concern    Not on file   Social History Narrative    Not on file     Social Determinants of Health     Financial Resource Strain:     Difficulty of Paying Living Expenses:    Food Insecurity:     Worried About Running Out of Food in the Last Year:     Ran Out of Food in the Last Year:    Transportation Needs:     Lack of Transportation (Medical):  Lack of Transportation (Non-Medical):    Physical Activity:     Days of Exercise per Week:     Minutes of Exercise per Session:    Stress:     Feeling of Stress :    Social Connections:     Frequency of Communication with Friends and Family:     Frequency of Social Gatherings with Friends and Family:     Attends Religion Services:     Active Member of Clubs or Organizations:     Attends Club or Organization Meetings:     Marital Status:    Intimate Partner Violence:     Fear of Current or Ex-Partner:     Emotionally Abused:     Physically Abused:     Sexually Abused:        Current Outpatient Medications on File Prior to Visit   Medication Sig Dispense Refill    nebulizer and compressor (NEBULIZER W/COMPRESSOR) by Does Not Apply route.  albuterol-ipratropium (DUO-NEB) 2.5 mg-0.5 mg/3 ml nebu 3 mL by Nebulization route every four (4) hours as needed for Wheezing.  hydrocodone-guaifenesin 2.5-200 mg/5 mL soln Take  by mouth.       levothyroxine (SYNTHROID) 100 mcg tablet TAKE 1 TABLET EVERY DAY BEFORE BREAKFAST 90 Tab 3    FLUoxetine (PROzac) 20 mg capsule Take 3 Caps by mouth daily. 90 Cap 0    therapeutic multivitamin (THERAGRAN) tablet Take 1 Tab by mouth daily. 30 Tab 2    QUEtiapine (SEROqueL) 25 mg tablet Take 25-50 mg by mouth nightly. For sleep      dextroamphetamine (DEXTROSTAT) 10 mg tab Take 10 mg by mouth three (3) times daily. 0    LORazepam (ATIVAN) 1 mg tablet TAKE 1 TABLET BY MOUTH TWICE A DAY AS NEEDED (Patient taking differently: 0.5-1 mg. For help sleeping) 60 Tab 1    [DISCONTINUED] azithromycin (ZITHROMAX) 250 mg tablet Take 250 mg by mouth daily. (Patient not taking: Reported on 8/5/2021)       No current facility-administered medications on file prior to visit. Review of Systems  Pertinent items are noted in HPI. Objective:     Gen: healthy sounding female  HEENT: no audible congestion, patient does not see oral erythema and sees MMM  Neck: patient does not feel enlarged or tender LAD or masses  Resp: normal respiratory effort, no audible wheezing. CV: patient does not feel palpitations or heart irregularity  Abd: patient does not feel abdominal tenderness or mass, patient does not notice distension  Extrem: patient does see swelling in obth lower legs  Neuro: Alert and oriented, able to answer questions without difficulty      Assessment/Plan:       ICD-10-CM ICD-9-CM    1. Edema, unspecified type  R60.9 782.3 triamterene-hydroCHLOROthiazide (DYAZIDE) 37.5-25 mg per capsule   2. Elevated blood pressure reading  R03.0 796.2 triamterene-hydroCHLOROthiazide (DYAZIDE) 37.5-25 mg per capsule     Elevate legs, low salt diet. Monitor BP. Dyazide TIW prn. This was a telemedicine visit by phone, duration 17 minutes. Froylan Lockwood MD    Follow-up and Dispositions    · Return if symptoms worsen or fail to improve.

## 2021-08-11 ENCOUNTER — PATIENT MESSAGE (OUTPATIENT)
Dept: INTERNAL MEDICINE CLINIC | Age: 71
End: 2021-08-11

## 2021-08-11 DIAGNOSIS — D80.3 IMMUNOGLOBULIN G SUBCLASS DEFICIENCY (HCC): Primary | ICD-10-CM

## 2021-08-28 DIAGNOSIS — R03.0 ELEVATED BLOOD PRESSURE READING: ICD-10-CM

## 2021-08-28 DIAGNOSIS — R60.9 EDEMA, UNSPECIFIED TYPE: ICD-10-CM

## 2021-08-28 RX ORDER — TRIAMTERENE AND HYDROCHLOROTHIAZIDE 37.5; 25 MG/1; MG/1
CAPSULE ORAL
Qty: 15 CAPSULE | Refills: 0 | Status: SHIPPED | OUTPATIENT
Start: 2021-08-28

## 2021-08-29 DIAGNOSIS — E03.9 ACQUIRED HYPOTHYROIDISM: ICD-10-CM

## 2021-08-29 RX ORDER — LEVOTHYROXINE SODIUM 100 UG/1
TABLET ORAL
Qty: 90 TABLET | Refills: 3 | Status: SHIPPED | OUTPATIENT
Start: 2021-08-29 | End: 2022-06-19

## 2022-03-14 ENCOUNTER — TRANSCRIBE ORDER (OUTPATIENT)
Dept: SCHEDULING | Age: 72
End: 2022-03-14

## 2022-03-14 DIAGNOSIS — Z12.31 SCREENING MAMMOGRAM FOR HIGH-RISK PATIENT: Primary | ICD-10-CM

## 2022-03-18 PROBLEM — J10.1 INFLUENZA A: Status: ACTIVE | Noted: 2020-02-22

## 2022-03-19 PROBLEM — R06.02 SOB (SHORTNESS OF BREATH): Status: ACTIVE | Noted: 2020-06-12

## 2022-03-19 PROBLEM — J96.01 ACUTE RESPIRATORY FAILURE WITH HYPOXIA (HCC): Status: ACTIVE | Noted: 2020-02-22

## 2022-03-19 PROBLEM — R06.2 WHEEZING: Status: ACTIVE | Noted: 2020-02-22

## 2022-04-06 ENCOUNTER — HOSPITAL ENCOUNTER (OUTPATIENT)
Dept: MAMMOGRAPHY | Age: 72
Discharge: HOME | End: 2022-04-06
Attending: FAMILY MEDICINE
Payer: MEDICARE

## 2022-04-06 DIAGNOSIS — Z12.31 SCREENING MAMMOGRAM FOR HIGH-RISK PATIENT: ICD-10-CM

## 2022-04-06 PROCEDURE — 77067 SCR MAMMO BI INCL CAD: CPT

## 2022-06-18 DIAGNOSIS — E03.9 ACQUIRED HYPOTHYROIDISM: ICD-10-CM

## 2022-06-19 RX ORDER — LEVOTHYROXINE SODIUM 100 UG/1
TABLET ORAL
Qty: 90 TABLET | Refills: 3 | Status: SHIPPED | OUTPATIENT
Start: 2022-06-19

## 2023-04-18 NOTE — ED TRIAGE NOTES
A week ago Friday pt started with congestion just getting worse.  Today pt was more SOB and had a headache Incubation Time: 0 Pre-Procedure Text: The treatment areas were cleaned and prepped in the usual fashion. Medical Necessity: Precancerous Lesions Detail Level: Zone Ndc# (Optional): 75121-105-83 Who Performed The Pdt (Staff): Chika Garzon Consent: Written consent obtained. The risks were reviewed with the patient including but not limited to: pigmentary changes, pain, blistering, scabbing, redness, and the remote possibility of scarring. Anesthesia Type: 1% lidocaine with epinephrine Number Of Kerasticks/Tubes Billed For: 1 Eye Protection Applied?: Yes Who Performed The Pdt?: Performed by Nurse, MA or Katie Soto 1948 ([id number]) Which Photosensitizer Was Used: Levulan Post-Care Instructions: I reviewed with the patient in detail post-care instructions. Patient is to avoid sunlight for the next 2 days, and wear sun protection. Patients may expect sunburn like redness, discomfort and scabbing. Comments: AKs greater than 15, scattered, wide spread Lot # (Optional): [id number] Illumination Time: 30:00 Debridement Text (Will Only Render In Visit Note If You Select Debridement Option Under Who Performed The Pdt Field): Prior to application of the photodynamic medication the hyperkeratotic lesions were curetted to make them more amenable to therapy. Show Inventory Tab: Hide Light Source: Leonard-U Expiration Date (Optional): 05/2026 Occlusion: No

## 2023-05-18 ENCOUNTER — OFFICE VISIT (OUTPATIENT)
Age: 73
End: 2023-05-18
Payer: MEDICARE

## 2023-05-18 DIAGNOSIS — G56.03 BILATERAL CARPAL TUNNEL SYNDROME: Primary | ICD-10-CM

## 2023-05-18 DIAGNOSIS — M54.12 CERVICAL RADICULOPATHY AT C5: ICD-10-CM

## 2023-05-18 PROCEDURE — 95886 MUSC TEST DONE W/N TEST COMP: CPT | Performed by: PSYCHIATRY & NEUROLOGY

## 2023-05-18 PROCEDURE — 95911 NRV CNDJ TEST 9-10 STUDIES: CPT | Performed by: PSYCHIATRY & NEUROLOGY

## 2023-06-26 ENCOUNTER — HOSPITAL ENCOUNTER (OUTPATIENT)
Facility: HOSPITAL | Age: 73
Discharge: HOME | End: 2023-06-29
Payer: MEDICARE

## 2023-06-26 DIAGNOSIS — Z12.31 ENCOUNTER FOR MAMMOGRAM TO ESTABLISH BASELINE MAMMOGRAM: ICD-10-CM

## 2023-06-26 PROCEDURE — 77063 BREAST TOMOSYNTHESIS BI: CPT

## 2023-06-26 PROCEDURE — 77067 SCR MAMMO BI INCL CAD: CPT

## 2023-07-18 NOTE — PROGRESS NOTES
[facility]  [address]  Phone [phone] Fax [phone]  Test Date:  2023    Patient: Kobe Laguerre : 1950 Physician: Hasmukh Paulson MD   Sex: Female Height: ' 0\" Ref Phys: Quita Garza MD   ID#: [id number] Weight: 120 lbs. Technician: Bertrand Wilson     Patient Complaints:      Patient History / Exam:  27-year-old female with history of cervical discectomy and fusion surgery 2 years ago who is being evaluated for ongoing tingling and numbness in both hands, left worse than right. NCV & EMG Findings:  Evaluation of the right median motor nerve showed reduced amplitude (3.8 mV). The left median sensory and the right median sensory nerves showed prolonged distal peak latency (L4.2, R3.8 ms) and decreased conduction velocity (Wrist-2nd Digit, L33, R37 m/s). The left median/ulnar (palm) comparison and the right median/ulnar (palm) comparison nerves showed abnormal peak latency difference (Median Palm-Ulnar Palm, L0.7, R0.5 ms). All remaining nerves  were within normal limits. All F Wave latencies were within normal limits. All F Wave left vs. right side latency differences were within normal limits. Needle evaluation of the left pronator teres muscle showed increased insertional activity, widespread polyphasic potentials, and early recruitment. The left biceps muscle showed increased insertional activity, increased motor unit amplitude, and slightly increased polyphasic potentials. The left triceps and the left deltoid muscles showed increased motor unit amplitude. The left mid cervical paraspinal muscle showed increased insertional activity. All remaining muscles (as indicated in the following table) showed no evidence of electrical instability. Impression:    The electrodiagnostic testing is suggestive of:  1.   Mild entrapment median mononeuropathy i.e. carpal Pt seen in ED and admit 7/15-7/18/23 for dx acute DVT LLE (popliteal & calf veins). Neg PE. INR therapeutic during admission (INR 2.8). Patient consulted with Dr. Levy (Hematology). Dr. Levy recommended discontinue warfarin and start heparin drip while inpatient. Surgery (thrombectomy) not recommended at this time. Patient dc'd on Lovenox (150mg) 1.5mg/kg q24 hrs x 1 month (end 8/17/23), then to f/u with Dr. Levy for possible change to DOAC. Thrombophilia work up complete during admission. Spoke with patient on phone. He confirms warfarin dc'd and he is aware to f/u with Dr. Levy's office to schedule appointment to discuss starting DOAC. Dc'd instructions given. Pt aware and agrees with plan. Pt dc'd from [facility]. Message routed to PCP as TOBIAS.

## 2025-01-06 ENCOUNTER — TELEPHONE (OUTPATIENT)
Age: 75
End: 2025-01-06

## 2025-01-13 ENCOUNTER — PROCEDURE VISIT (OUTPATIENT)
Age: 75
End: 2025-01-13
Payer: MEDICARE

## 2025-01-13 DIAGNOSIS — G56.03 BILATERAL CARPAL TUNNEL SYNDROME: Primary | ICD-10-CM

## 2025-01-13 DIAGNOSIS — M54.12 CERVICAL RADICULOPATHY: ICD-10-CM

## 2025-01-13 PROCEDURE — 95886 MUSC TEST DONE W/N TEST COMP: CPT | Performed by: PSYCHIATRY & NEUROLOGY

## 2025-01-13 PROCEDURE — 95911 NRV CNDJ TEST 9-10 STUDIES: CPT | Performed by: PSYCHIATRY & NEUROLOGY

## 2025-01-13 NOTE — PROGRESS NOTES
Radial C6-7-8 Nml Nml Nml Nml Nml 0 Nml Nml    Right Deltoid Axillary C5-6 Nml Nml Nml Nml Nml 0 Nml Nml        Waveforms: